# Patient Record
Sex: MALE | Race: WHITE | NOT HISPANIC OR LATINO | Employment: OTHER | URBAN - NONMETROPOLITAN AREA
[De-identification: names, ages, dates, MRNs, and addresses within clinical notes are randomized per-mention and may not be internally consistent; named-entity substitution may affect disease eponyms.]

---

## 2017-03-17 ENCOUNTER — OFFICE VISIT (OUTPATIENT)
Dept: UROLOGY | Facility: CLINIC | Age: 62
End: 2017-03-17

## 2017-03-17 VITALS
HEART RATE: 86 BPM | OXYGEN SATURATION: 99 % | TEMPERATURE: 98.8 F | DIASTOLIC BLOOD PRESSURE: 80 MMHG | SYSTOLIC BLOOD PRESSURE: 110 MMHG

## 2017-03-17 DIAGNOSIS — Z87.448 HISTORY OF HEMATURIA: Primary | ICD-10-CM

## 2017-03-17 DIAGNOSIS — E29.1 HYPOGONADISM IN MALE: ICD-10-CM

## 2017-03-17 LAB
BILIRUB BLD-MCNC: NEGATIVE MG/DL
CLARITY, POC: CLEAR
COLOR UR: YELLOW
GLUCOSE UR STRIP-MCNC: NEGATIVE MG/DL
KETONES UR QL: NEGATIVE
LEUKOCYTE EST, POC: NEGATIVE
NITRITE UR-MCNC: NEGATIVE MG/ML
PH UR: 6 [PH] (ref 5–8)
PROT UR STRIP-MCNC: NEGATIVE MG/DL
RBC # UR STRIP: ABNORMAL /UL
SP GR UR: 1.03 (ref 1–1.03)
UROBILINOGEN UR QL: NORMAL

## 2017-03-17 PROCEDURE — 99213 OFFICE O/P EST LOW 20 MIN: CPT | Performed by: UROLOGY

## 2017-03-17 PROCEDURE — 96372 THER/PROPH/DIAG INJ SC/IM: CPT | Performed by: UROLOGY

## 2017-03-17 PROCEDURE — 81003 URINALYSIS AUTO W/O SCOPE: CPT | Performed by: UROLOGY

## 2017-03-17 RX ORDER — TESTOSTERONE CYPIONATE 200 MG/ML
400 INJECTION, SOLUTION INTRAMUSCULAR ONCE
Status: COMPLETED | OUTPATIENT
Start: 2017-03-17 | End: 2017-03-17

## 2017-03-17 RX ADMIN — TESTOSTERONE CYPIONATE 400 MG: 200 INJECTION, SOLUTION INTRAMUSCULAR at 15:40

## 2017-03-17 NOTE — PROGRESS NOTES
Chief Complaint  Follow-up (3 MONTH FUP WITH LABS.)        ANTHONY Cee is a 61 y.o. male who returns today for follow-up of hypogonadism and low testosterone level.  He had a marginal improvement only and was dissatisfied with the topical testosterone cream.  Unfortunately his insurance deductibles are going to be too high on Aveed and Testopel.  Therefore he will begin the Depo-testosterone injections every 3 weeks in the office for trial.  He can return in 3 months with follow-up blood work to assure safety.  He has lot of heart problems in his cardiologist is anxious for him to take the testosterone.  He is currently taking aspirin and Plavix but his had no more gross hematuria from his enlarged prostate.  He CT scan last year revealed normal upper tracts except for some benign cysts    Vitals:    03/17/17 1109   BP: 110/80   Pulse: 86   Temp: 98.8 °F (37.1 °C)   SpO2: 99%       Past Medical History  Past Medical History   Diagnosis Date   • Allergic rhinitis    • Anxiety    • Back pain    • CAD (coronary artery disease)    • GERD (gastroesophageal reflux disease)    • Heart aneurysm    • Hyperlipidemia    • Hypertension    • Hypogonadism in male    • Kidney cysts    • Osteoarthritis    • Renal cyst    • Restless leg syndrome    • Sinus problem    • Stroke    • Vertigo    • Vision problem        Past Surgical History  Past Surgical History   Procedure Laterality Date   • Colonoscopy  2006   • Back surgery  09/03/2015       Medications  has a current medication list which includes the following prescription(s): acetaminophen, aspirin, aspirin, buspirone, carvedilol, cetirizine, clopidogrel, finasteride, fluvirin, isosorbide mononitrate, omeprazole, and rosuvastatin.      Allergies  No Known Allergies    Social History  Social History     Social History Narrative       Family History  He has no family history of bladder or kidney cancer  He has no family history of kidney stones      AUA Symptom Score:      Review  of Systems  Review of Systems   Constitutional: Negative.    Genitourinary: Negative.    All other systems reviewed and are negative.      Physical Exam  Physical Exam    Labs Recent and today in the office:  Results for orders placed or performed in visit on 03/17/17   POC Urinalysis Dipstick, Automated   Result Value Ref Range    Color Yellow Yellow, Straw, Dark Yellow, Reina    Clarity, UA Clear Clear    Glucose, UA Negative Negative, 1000 mg/dL (3+) mg/dL    Bilirubin Negative Negative    Ketones, UA Negative Negative    Specific Gravity  1.030 1.005 - 1.030    Blood, UA Trace (A) Negative    pH, Urine 6.0 5.0 - 8.0    Protein, POC Negative Negative mg/dL    Urobilinogen, UA Normal Normal    Leukocytes Negative Negative    Nitrite, UA Negative Negative         Assessment & Plan  He'll return in 3 months for reassessment

## 2017-04-07 ENCOUNTER — CLINICAL SUPPORT (OUTPATIENT)
Dept: UROLOGY | Facility: CLINIC | Age: 62
End: 2017-04-07

## 2017-04-07 DIAGNOSIS — E29.1 HYPOGONADISM IN MALE: ICD-10-CM

## 2017-04-07 DIAGNOSIS — R79.89 LOW TESTOSTERONE: Primary | ICD-10-CM

## 2017-04-07 PROCEDURE — 96372 THER/PROPH/DIAG INJ SC/IM: CPT | Performed by: UROLOGY

## 2017-04-07 RX ORDER — TESTOSTERONE CYPIONATE 200 MG/ML
400 INJECTION, SOLUTION INTRAMUSCULAR ONCE
Status: COMPLETED | OUTPATIENT
Start: 2017-04-07 | End: 2017-04-07

## 2017-04-07 RX ADMIN — TESTOSTERONE CYPIONATE 400 MG: 200 INJECTION, SOLUTION INTRAMUSCULAR at 13:39

## 2017-04-27 ENCOUNTER — CLINICAL SUPPORT (OUTPATIENT)
Dept: UROLOGY | Facility: CLINIC | Age: 62
End: 2017-04-27

## 2017-04-27 VITALS — HEART RATE: 82 BPM | SYSTOLIC BLOOD PRESSURE: 128 MMHG | DIASTOLIC BLOOD PRESSURE: 85 MMHG

## 2017-04-27 DIAGNOSIS — R79.89 LOW TESTOSTERONE: Primary | ICD-10-CM

## 2017-04-27 DIAGNOSIS — E29.1 HYPOGONADISM IN MALE: ICD-10-CM

## 2017-04-27 PROCEDURE — 96372 THER/PROPH/DIAG INJ SC/IM: CPT | Performed by: UROLOGY

## 2017-04-27 RX ORDER — TESTOSTERONE CYPIONATE 200 MG/ML
400 INJECTION, SOLUTION INTRAMUSCULAR ONCE
Status: COMPLETED | OUTPATIENT
Start: 2017-04-27 | End: 2017-04-27

## 2017-04-27 RX ADMIN — TESTOSTERONE CYPIONATE 400 MG: 200 INJECTION, SOLUTION INTRAMUSCULAR at 14:28

## 2017-05-05 ENCOUNTER — TRANSCRIBE ORDERS (OUTPATIENT)
Dept: ENDOCRINOLOGY | Facility: CLINIC | Age: 62
End: 2017-05-05

## 2017-05-05 DIAGNOSIS — R79.89 LOW TESTOSTERONE: Primary | ICD-10-CM

## 2017-05-26 ENCOUNTER — CLINICAL SUPPORT (OUTPATIENT)
Dept: UROLOGY | Facility: CLINIC | Age: 62
End: 2017-05-26

## 2017-05-26 VITALS — DIASTOLIC BLOOD PRESSURE: 72 MMHG | SYSTOLIC BLOOD PRESSURE: 120 MMHG

## 2017-05-26 DIAGNOSIS — R79.89 LOW TESTOSTERONE: Primary | ICD-10-CM

## 2017-05-26 DIAGNOSIS — E29.1 HYPOGONADISM IN MALE: ICD-10-CM

## 2017-05-26 PROCEDURE — 96372 THER/PROPH/DIAG INJ SC/IM: CPT | Performed by: UROLOGY

## 2017-05-26 RX ORDER — TESTOSTERONE CYPIONATE 200 MG/ML
400 INJECTION, SOLUTION INTRAMUSCULAR ONCE
Status: COMPLETED | OUTPATIENT
Start: 2017-05-26 | End: 2017-05-26

## 2017-05-26 RX ADMIN — TESTOSTERONE CYPIONATE 400 MG: 200 INJECTION, SOLUTION INTRAMUSCULAR at 11:00

## 2017-06-19 ENCOUNTER — LAB (OUTPATIENT)
Dept: UROLOGY | Facility: CLINIC | Age: 62
End: 2017-06-19

## 2017-06-19 DIAGNOSIS — E29.1 TESTICULAR HYPOGONADISM: ICD-10-CM

## 2017-06-19 DIAGNOSIS — N40.1 BPH (BENIGN PROSTATIC HYPERTROPHY) WITH URINARY RETENTION: Primary | ICD-10-CM

## 2017-06-19 DIAGNOSIS — R33.8 BPH (BENIGN PROSTATIC HYPERTROPHY) WITH URINARY RETENTION: Primary | ICD-10-CM

## 2017-06-20 LAB
BASOPHILS # BLD AUTO: 0.06 10*3/MM3 (ref 0–0.2)
BASOPHILS NFR BLD AUTO: 0.6 % (ref 0–2.5)
CONV COMMENT: ABNORMAL
EOSINOPHIL # BLD AUTO: 0.23 10*3/MM3 (ref 0–0.7)
EOSINOPHIL NFR BLD AUTO: 2.5 % (ref 0–7)
ERYTHROCYTE [DISTWIDTH] IN BLOOD BY AUTOMATED COUNT: 13 % (ref 11.5–14.5)
ESTRADIOL SERPL-MCNC: 10.4 PG/ML (ref 7.6–42.6)
HCT VFR BLD AUTO: 44.2 % (ref 42–52)
HGB BLD-MCNC: 14.7 G/DL (ref 14–18)
IMM GRANULOCYTES # BLD: 0.04 10*3/MM3 (ref 0–0.06)
IMM GRANULOCYTES NFR BLD: 0.4 % (ref 0–0.6)
LYMPHOCYTES # BLD AUTO: 3.02 10*3/MM3 (ref 0.6–3.4)
LYMPHOCYTES NFR BLD AUTO: 32.3 % (ref 10–50)
MCH RBC QN AUTO: 30.6 PG (ref 27–31)
MCHC RBC AUTO-ENTMCNC: 33.3 G/DL (ref 30–37)
MCV RBC AUTO: 92.1 FL (ref 80–94)
MONOCYTES # BLD AUTO: 0.89 10*3/MM3 (ref 0–0.9)
MONOCYTES NFR BLD AUTO: 9.5 % (ref 0–12)
NEUTROPHILS # BLD AUTO: 5.1 10*3/MM3 (ref 2–6.9)
NEUTROPHILS NFR BLD AUTO: 54.7 % (ref 37–80)
NRBC BLD AUTO-RTO: 0 /100 WBC (ref 0–0)
PLATELET # BLD AUTO: 286 10*3/MM3 (ref 130–400)
PSA SERPL-MCNC: 1.4 NG/ML (ref 0.06–4)
RBC # BLD AUTO: 4.8 10*6/MM3 (ref 4.7–6.1)
TESTOST SERPL-MCNC: 186 NG/DL (ref 348–1197)
WBC # BLD AUTO: 9.34 10*3/MM3 (ref 4.8–10.8)

## 2017-10-17 ENCOUNTER — TRANSCRIBE ORDERS (OUTPATIENT)
Dept: ADMINISTRATIVE | Facility: HOSPITAL | Age: 62
End: 2017-10-17

## 2017-10-17 DIAGNOSIS — R53.83 TIREDNESS: Primary | ICD-10-CM

## 2017-10-20 ENCOUNTER — APPOINTMENT (OUTPATIENT)
Dept: BONE DENSITY | Facility: HOSPITAL | Age: 62
End: 2017-10-20

## 2017-10-20 DIAGNOSIS — R53.83 TIREDNESS: ICD-10-CM

## 2017-10-20 PROCEDURE — 77080 DXA BONE DENSITY AXIAL: CPT

## 2017-11-07 ENCOUNTER — OFFICE VISIT (OUTPATIENT)
Dept: GASTROENTEROLOGY | Facility: CLINIC | Age: 62
End: 2017-11-07

## 2017-11-07 VITALS
TEMPERATURE: 97.8 F | DIASTOLIC BLOOD PRESSURE: 76 MMHG | BODY MASS INDEX: 29.26 KG/M2 | RESPIRATION RATE: 14 BRPM | HEIGHT: 71 IN | HEART RATE: 72 BPM | WEIGHT: 209 LBS | SYSTOLIC BLOOD PRESSURE: 129 MMHG

## 2017-11-07 DIAGNOSIS — K57.30 DIVERTICULOSIS OF COLON WITHOUT HEMORRHAGE: ICD-10-CM

## 2017-11-07 DIAGNOSIS — K63.9 LESION OF COLON: Primary | ICD-10-CM

## 2017-11-07 DIAGNOSIS — D12.2 ADENOMATOUS POLYP OF ASCENDING COLON: ICD-10-CM

## 2017-11-07 DIAGNOSIS — R12 HEARTBURN: ICD-10-CM

## 2017-11-07 PROCEDURE — 99214 OFFICE O/P EST MOD 30 MIN: CPT | Performed by: INTERNAL MEDICINE

## 2017-11-07 NOTE — PATIENT INSTRUCTIONS
1. Antireflux measures.  2. Low-fat diet.  3. Weight reduction.  4. Colonoscopy for reevaluation of the area resected in the ascending colon-3 cm flat lesion resected in October 2016: Description of the procedure, risks benefits alternatives and options including non-operative options were discussed with the patient in detail.  The patient understands and wishes to proceed.

## 2017-11-07 NOTE — PROGRESS NOTES
"Chief Complaint   Patient presents with   • Follow-up       History of Present Illness     The patient came back for follow visit today.  The patient feels better.  The patient denies recent change in bowel habits.  There is no diarrhea or constipation.  There is no history of abdominal pain.  There is no history of overt GI bleed (hematemesis melena or hematochezia).  The patient denies nausea or vomiting.  The patient has a history of reflux off and on for the last several years.  The reflux is moderately severe.  Symptoms are described as retrosternal burning sensation, and indigestion.  There is history of occasional regurgitative symptoms.  Frequency being several times per week.  The symptoms are worse at night.  The patient takes acid suppressive therapy with reasonable control of his symptoms.  The patient denies dysphagia or odynophagia.  There is no history of recent significant weight loss.  There is no history of liver or pancreatic disease.  The patient has history of retrosternal chest pains off and on almost on daily basis for the last several years.  The patient was last evaluated by cardiology service 2 months ago and was thought to be stable.  He had been treated with Ranexa in the past without significant improvement.  The patient has \"microvascular disease.    The patient has history of multiple colon polyps including large lesions.  One lesion in the ascending colon measuring 3 cm for which the patient had piecemeal undergone endoscopic mucosal resection in October 2016.  Biopsies revealed tubular adenoma.     Review of Systems   Constitutional: Negative for appetite change, chills, fatigue, fever and unexpected weight change.   HENT: Negative for mouth sores, nosebleeds and trouble swallowing.    Eyes: Negative for discharge and redness.   Respiratory: Negative for apnea, cough and shortness of breath.    Cardiovascular: Negative for chest pain, palpitations and leg swelling. "   Gastrointestinal: Negative for abdominal distention, abdominal pain, anal bleeding, blood in stool, constipation, diarrhea, nausea and vomiting.   Endocrine: Negative for cold intolerance, heat intolerance and polydipsia.   Genitourinary: Negative for dysuria, hematuria and urgency.   Musculoskeletal: Positive for arthralgias. Negative for joint swelling and myalgias.   Skin: Negative for rash.   Allergic/Immunologic: Negative for food allergies and immunocompromised state.   Neurological: Negative for dizziness, seizures, syncope and headaches.   Hematological: Negative for adenopathy. Bruises/bleeds easily.   Psychiatric/Behavioral: Negative for dysphoric mood. The patient is not nervous/anxious and is not hyperactive.      Patient Active Problem List   Diagnosis   • BPH (benign prostatic hyperplasia)   • Renal cyst     Past Medical History:   Diagnosis Date   • Allergic rhinitis    • Anxiety    • Back pain    • CAD (coronary artery disease)    • GERD (gastroesophageal reflux disease)    • Heart aneurysm    • Hyperlipidemia    • Hypertension    • Hypogonadism in male    • Kidney cysts    • Osteoarthritis    • Renal cyst    • Restless leg syndrome    • Sinus problem    • Stroke    • Vertigo    • Vision problem      Past Surgical History:   Procedure Laterality Date   • BACK SURGERY  09/03/2015   • COLONOSCOPY  2006     Family History   Problem Relation Age of Onset   • Colon cancer Father      Social History   Substance Use Topics   • Smoking status: Former Smoker   • Smokeless tobacco: Never Used      Comment: quit 2014   • Alcohol use No       Current Outpatient Prescriptions:   •  acetaminophen (TYLENOL) 500 MG tablet, Take 500 mg by mouth every 6 (six) hours as needed for mild pain (1-3)., Disp: , Rfl:   •  aspirin 81 MG EC tablet, Take 81 mg by mouth daily., Disp: , Rfl:   •  busPIRone (BUSPAR) 10 MG tablet, Take 10 mg by mouth 3 (three) times a day., Disp: , Rfl:   •  cetirizine (ZyrTEC) 10 MG tablet, Take  "10 mg by mouth daily., Disp: , Rfl:   •  clopidogrel (PLAVIX) 75 MG tablet, Take 75 mg by mouth daily., Disp: , Rfl:   •  finasteride (PROSCAR) 5 MG tablet, Take 1 tablet by mouth Daily., Disp: 90 tablet, Rfl: 3  •  FLUVIRIN suspension injection, ADM 0.5ML IM UTD, Disp: , Rfl: 0  •  isosorbide mononitrate (IMDUR) 30 MG 24 hr tablet, Take 30 mg by mouth daily., Disp: , Rfl:   •  METOPROLOL TARTRATE PO, Take  by mouth., Disp: , Rfl:   •  omeprazole (PriLOSEC) 20 MG capsule, Take 20 mg by mouth daily., Disp: , Rfl:   •  rosuvastatin (CRESTOR) 10 MG tablet, Take 10 mg by mouth daily., Disp: , Rfl:     No Known Allergies    Blood pressure 129/76, pulse 72, temperature 97.8 °F (36.6 °C), resp. rate 14, height 71\" (180.3 cm), weight 209 lb (94.8 kg).    Physical Exam   Constitutional: He is oriented to person, place, and time. He appears well-developed and well-nourished. No distress.   HENT:   Head: Normocephalic and atraumatic.   Right Ear: Hearing and external ear normal.   Left Ear: Hearing and external ear normal.   Nose: Nose normal.   Mouth/Throat: Oropharynx is clear and moist and mucous membranes are normal. Mucous membranes are not pale, not dry and not cyanotic. No oral lesions. No oropharyngeal exudate.   Eyes: Conjunctivae and EOM are normal. Right eye exhibits no discharge. Left eye exhibits no discharge. No scleral icterus.   Neck: Trachea normal. Neck supple. No JVD present. No edema present. No thyroid mass and no thyromegaly present.   Cardiovascular: Normal rate, regular rhythm, S2 normal and normal heart sounds.  Exam reveals no gallop, no S3 and no friction rub.    No murmur heard.  Pulmonary/Chest: Effort normal and breath sounds normal. No respiratory distress. He has no wheezes. He has no rales. He exhibits no tenderness.   Abdominal: Soft. Normal appearance and bowel sounds are normal. He exhibits no distension, no ascites and no mass. There is no splenomegaly or hepatomegaly. There is no " tenderness. There is no rigidity, no rebound and no guarding. No hernia.   Musculoskeletal: He exhibits no tenderness or deformity.       Vascular Status -  His exam exhibits no right foot edema. His exam exhibits no left foot edema.  Lymphadenopathy:     He has no cervical adenopathy.        Left: No supraclavicular adenopathy present.   Neurological: He is alert and oriented to person, place, and time. He has normal strength. No cranial nerve deficit or sensory deficit. He exhibits normal muscle tone. Coordination normal.   Skin: No rash noted. He is not diaphoretic. No cyanosis. No pallor. Nails show no clubbing.   Psychiatric: He has a normal mood and affect. His behavior is normal. Judgment and thought content normal.   Nursing note and vitals reviewed.    Stigmata of chronic liver disease:  None.  Asterixis:  None.      Laboratory Testing:  Upon review of medical records:    Dated September 22, 2015 white count 9.2 hemoglobin 14.4 hematocrit 41.7 and platelets 312  Dated September 1, 2015 sodium 140 potassium 3.8 chloride 108 CO2 21 BUN 8 creatinine 0.7 and glucose 83 calcium 9.0.  Dated September 1, 2015 PT 19.6 INR 0.92.    Dated June 20, 2017 WBC 9.34 hemoglobin 14.7 hematocrit 44.2 MCV 92.1 and platelet count 286.     Procedures:    Upon review of medical records:     Dated October 5, 2016 the patient underwent a colonoscopy to the terminal ileum which revealed:  Large flat lesion within the ascending colon, flat lesions within the cecum status post mucosal resections and thermal ablation therapy.  Left-sided diverticulosis.  Colon polyps.  Internal hemorrhoids.  Transverse colon polyp, biopsy revealed tubular adenoma.  Ascending colon, flat lesion, biopsy revealed tubular adenoma.  Colon polyp, appendiceal orifice, biopsy revealed tubular adenoma.  Colon, Cecum, lesion Biopsy revealed tubular adenoma.       Assessment and Plan:      Deny was seen today for follow-up.    Diagnoses and all orders for  this visit:    Lesion of colon  Comments:  3 cm flat lesion in the ascending colon.  Status post piecemeal endoscopic mucosal resection in October 2016.    Adenomatous polyp of ascending colon  Comments:  Multiple large colonic tubular adenomata.    Diverticulosis of colon without hemorrhage  Comments:  Uncomplicated.    Heartburn  Comments:  Stable.              Plan  and Patient Instructions:    Patient Instructions   1. Antireflux measures.  2. Low-fat diet.  3. Weight reduction.  4. Colonoscopy for reevaluation of the area resected in the ascending colon-3 cm flat lesion resected in October 2016: Description of the procedure, risks benefits alternatives and options including non-operative options were discussed with the patient in detail.  The patient understands and wishes to proceed.          Juliano Manriquez MD

## 2017-11-08 ENCOUNTER — PREP FOR SURGERY (OUTPATIENT)
Dept: OTHER | Facility: HOSPITAL | Age: 62
End: 2017-11-08

## 2017-11-08 DIAGNOSIS — Z80.0 FAMILY HISTORY OF COLON CANCER: ICD-10-CM

## 2017-11-08 DIAGNOSIS — Z12.11 COLON CANCER SCREENING: Primary | ICD-10-CM

## 2017-11-08 DIAGNOSIS — K63.9 LESION OF COLON: ICD-10-CM

## 2017-11-08 DIAGNOSIS — Z86.010 HISTORY OF COLON POLYPS: ICD-10-CM

## 2017-11-15 RX ORDER — SODIUM CHLORIDE 9 MG/ML
70 INJECTION, SOLUTION INTRAVENOUS CONTINUOUS PRN
Status: CANCELLED | OUTPATIENT
Start: 2017-11-15

## 2017-11-16 PROBLEM — K63.9 LESION OF COLON: Status: ACTIVE | Noted: 2017-11-16

## 2017-11-16 PROBLEM — Z86.0100 HISTORY OF COLON POLYPS: Status: ACTIVE | Noted: 2017-11-16

## 2017-11-16 PROBLEM — Z86.010 HISTORY OF COLON POLYPS: Status: ACTIVE | Noted: 2017-11-16

## 2017-11-16 PROBLEM — Z12.11 COLON CANCER SCREENING: Status: ACTIVE | Noted: 2017-11-16

## 2017-11-16 PROBLEM — Z80.0 FAMILY HISTORY OF COLON CANCER: Status: ACTIVE | Noted: 2017-11-16

## 2017-11-28 ENCOUNTER — ANESTHESIA (OUTPATIENT)
Dept: GASTROENTEROLOGY | Facility: HOSPITAL | Age: 62
End: 2017-11-28

## 2017-11-28 ENCOUNTER — ANESTHESIA EVENT (OUTPATIENT)
Dept: GASTROENTEROLOGY | Facility: HOSPITAL | Age: 62
End: 2017-11-28

## 2017-11-28 ENCOUNTER — HOSPITAL ENCOUNTER (OUTPATIENT)
Facility: HOSPITAL | Age: 62
Setting detail: HOSPITAL OUTPATIENT SURGERY
Discharge: HOME | End: 2017-11-28
Attending: INTERNAL MEDICINE | Admitting: INTERNAL MEDICINE

## 2017-11-28 VITALS
TEMPERATURE: 97.3 F | HEIGHT: 71 IN | SYSTOLIC BLOOD PRESSURE: 123 MMHG | OXYGEN SATURATION: 98 % | WEIGHT: 203 LBS | RESPIRATION RATE: 18 BRPM | HEART RATE: 61 BPM | DIASTOLIC BLOOD PRESSURE: 70 MMHG | BODY MASS INDEX: 28.42 KG/M2

## 2017-11-28 DIAGNOSIS — Z80.0 FAMILY HISTORY OF COLON CANCER: ICD-10-CM

## 2017-11-28 DIAGNOSIS — Z86.010 HISTORY OF COLON POLYPS: ICD-10-CM

## 2017-11-28 DIAGNOSIS — K63.9 LESION OF COLON: ICD-10-CM

## 2017-11-28 DIAGNOSIS — Z12.11 COLON CANCER SCREENING: ICD-10-CM

## 2017-11-28 PROCEDURE — 25010000002 PROPOFOL 200 MG/20ML EMULSION: Performed by: NURSE ANESTHETIST, CERTIFIED REGISTERED

## 2017-11-28 PROCEDURE — 45381 COLONOSCOPY SUBMUCOUS NJX: CPT | Performed by: INTERNAL MEDICINE

## 2017-11-28 PROCEDURE — 45382 COLONOSCOPY W/CONTROL BLEED: CPT | Performed by: INTERNAL MEDICINE

## 2017-11-28 PROCEDURE — 45380 COLONOSCOPY AND BIOPSY: CPT | Performed by: INTERNAL MEDICINE

## 2017-11-28 PROCEDURE — 88305 TISSUE EXAM BY PATHOLOGIST: CPT | Performed by: INTERNAL MEDICINE

## 2017-11-28 PROCEDURE — 45388 COLONOSCOPY W/ABLATION: CPT | Performed by: INTERNAL MEDICINE

## 2017-11-28 PROCEDURE — 45385 COLONOSCOPY W/LESION REMOVAL: CPT | Performed by: INTERNAL MEDICINE

## 2017-11-28 PROCEDURE — 25010000002 MIDAZOLAM PER 1 MG: Performed by: NURSE ANESTHETIST, CERTIFIED REGISTERED

## 2017-11-28 RX ORDER — PROPOFOL 10 MG/ML
INJECTION, EMULSION INTRAVENOUS AS NEEDED
Status: DISCONTINUED | OUTPATIENT
Start: 2017-11-28 | End: 2017-11-28 | Stop reason: SURG

## 2017-11-28 RX ORDER — MEPERIDINE HYDROCHLORIDE 50 MG/ML
INJECTION INTRAMUSCULAR; INTRAVENOUS; SUBCUTANEOUS AS NEEDED
Status: DISCONTINUED | OUTPATIENT
Start: 2017-11-28 | End: 2017-11-28 | Stop reason: SURG

## 2017-11-28 RX ORDER — MIDAZOLAM HYDROCHLORIDE 1 MG/ML
INJECTION INTRAMUSCULAR; INTRAVENOUS AS NEEDED
Status: DISCONTINUED | OUTPATIENT
Start: 2017-11-28 | End: 2017-11-28 | Stop reason: SURG

## 2017-11-28 RX ORDER — SODIUM CHLORIDE 9 MG/ML
70 INJECTION, SOLUTION INTRAVENOUS CONTINUOUS PRN
Status: DISCONTINUED | OUTPATIENT
Start: 2017-11-28 | End: 2017-11-28 | Stop reason: HOSPADM

## 2017-11-28 RX ADMIN — MEPERIDINE HYDROCHLORIDE 50 MG: 50 INJECTION INTRAMUSCULAR; INTRAVENOUS; SUBCUTANEOUS at 10:42

## 2017-11-28 RX ADMIN — PROPOFOL 30 MG: 10 INJECTION, EMULSION INTRAVENOUS at 11:17

## 2017-11-28 RX ADMIN — PROPOFOL 40 MG: 10 INJECTION, EMULSION INTRAVENOUS at 11:25

## 2017-11-28 RX ADMIN — PROPOFOL 50 MG: 10 INJECTION, EMULSION INTRAVENOUS at 11:21

## 2017-11-28 RX ADMIN — PROPOFOL 50 MG: 10 INJECTION, EMULSION INTRAVENOUS at 11:13

## 2017-11-28 RX ADMIN — PROPOFOL 30 MG: 10 INJECTION, EMULSION INTRAVENOUS at 11:32

## 2017-11-28 RX ADMIN — LIDOCAINE HYDROCHLORIDE 30 MG: 20 INJECTION, SOLUTION INTRAVENOUS at 10:52

## 2017-11-28 RX ADMIN — MIDAZOLAM HYDROCHLORIDE 2 MG: 1 INJECTION, SOLUTION INTRAMUSCULAR; INTRAVENOUS at 10:41

## 2017-11-28 RX ADMIN — PROPOFOL 50 MG: 10 INJECTION, EMULSION INTRAVENOUS at 10:58

## 2017-11-28 RX ADMIN — PROPOFOL 60 MG: 10 INJECTION, EMULSION INTRAVENOUS at 11:05

## 2017-11-28 RX ADMIN — PROPOFOL 40 MG: 10 INJECTION, EMULSION INTRAVENOUS at 11:42

## 2017-11-28 RX ADMIN — SODIUM CHLORIDE 70 ML/HR: 9 INJECTION, SOLUTION INTRAVENOUS at 09:09

## 2017-11-28 RX ADMIN — PROPOFOL 50 MG: 10 INJECTION, EMULSION INTRAVENOUS at 10:52

## 2017-11-28 NOTE — ANESTHESIA POSTPROCEDURE EVALUATION
Patient: Deny Cee    Procedure Summary     Date Anesthesia Start Anesthesia Stop Room / Location    11/28/17 1040 1152 [facility] ENDOSCOPY 2 / [facility] ENDOSCOPY       Procedure Diagnosis Surgeon Provider    COLONOSCOPY with cold snare polypectomy, cold biopsy polypectomies,  argon thermal ablation, submucosal injection of normal saline,  and biopsies (N/A Anus) Angiodysplasia of colon with hemorrhage; Colon polyps; Internal hemorrhoid; History of colon polyps  (Family history of colon cancer [Z80.0]; Colon cancer screening [Z12.11]; Lesion of colon [K63.9]; History of colon polyps [Z86.010]) MD Julio Delarosa CRNA          Anesthesia Type: MAC  Last vitals  BP   115/55 (11/28/17 0903)   Temp   97.3 °F (36.3 °C) (11/28/17 0903)   Pulse   56 (11/28/17 0903)   Resp   18 (11/28/17 0903)     SpO2   98 % (11/28/17 0903)     Post Anesthesia Care and Evaluation    Patient location during evaluation: bedside  Patient participation: complete - patient participated  Level of consciousness: awake and alert  Pain management: satisfactory to patient  Airway patency: patent  Anesthetic complications: No anesthetic complications  PONV Status: controlled  Cardiovascular status: acceptable and stable  Respiratory status: acceptable  Hydration status: acceptable

## 2017-11-28 NOTE — ANESTHESIA PREPROCEDURE EVALUATION
Anesthesia Evaluation     Patient summary reviewed and Nursing notes reviewed   NPO Solid Status: > 8 hours  NPO Liquid Status: > 8 hours     Airway   Mallampati: II  TM distance: <3 FB  no difficulty expected  Dental      Pulmonary    Cardiovascular   Exercise tolerance: good (4-7 METS)    Rhythm: regular  Rate: normal    (+) hypertension, CAD, CHF, hyperlipidemia    ROS comment: Last used ntg two weeks ago    Neuro/Psych  (+) CVA, dizziness/light headedness,    GI/Hepatic/Renal/Endo    (+)  GERD,     Musculoskeletal     (+) back pain,   Abdominal    Substance History      OB/GYN          Other   (+) arthritis   history of cancer                                  Anesthesia Plan    ASA 3     MAC     intravenous induction   Anesthetic plan and risks discussed with patient.    Plan discussed with CRNA.

## 2017-12-05 LAB
LAB AP CASE REPORT: NORMAL
Lab: NORMAL
PATH REPORT.FINAL DX SPEC: NORMAL

## 2018-01-03 ENCOUNTER — OFFICE VISIT (OUTPATIENT)
Dept: GASTROENTEROLOGY | Facility: CLINIC | Age: 63
End: 2018-01-03

## 2018-01-03 VITALS
HEART RATE: 80 BPM | BODY MASS INDEX: 29.12 KG/M2 | SYSTOLIC BLOOD PRESSURE: 122 MMHG | DIASTOLIC BLOOD PRESSURE: 73 MMHG | HEIGHT: 71 IN | TEMPERATURE: 97.9 F | WEIGHT: 208 LBS | RESPIRATION RATE: 18 BRPM

## 2018-01-03 DIAGNOSIS — K55.20 ANGIODYSPLASIA OF COLON: ICD-10-CM

## 2018-01-03 DIAGNOSIS — R12 HEARTBURN: ICD-10-CM

## 2018-01-03 DIAGNOSIS — K57.30 DIVERTICULOSIS OF COLON WITHOUT HEMORRHAGE: ICD-10-CM

## 2018-01-03 DIAGNOSIS — D12.2 ADENOMATOUS POLYP OF ASCENDING COLON: Primary | ICD-10-CM

## 2018-01-03 PROCEDURE — 99214 OFFICE O/P EST MOD 30 MIN: CPT | Performed by: INTERNAL MEDICINE

## 2018-01-03 RX ORDER — FAMOTIDINE 20 MG/1
20 TABLET, FILM COATED ORAL 2 TIMES DAILY
Qty: 60 TABLET | Refills: 3 | Status: SHIPPED | OUTPATIENT
Start: 2018-01-03 | End: 2018-07-03 | Stop reason: SDUPTHER

## 2018-01-03 NOTE — PROGRESS NOTES
Chief Complaint   Patient presents with   • Follow-up       History of Present Illness     The patient came back for follow visit today.  The patient feels better.  The patient denies recent change in bowel habits.  There is no diarrhea or constipation.  There is no history of abdominal pain.  The patient has history of bright red blood per rectum off and on for the last several months.  This is described as mild.  Quantity being a couple of drops at a time.  Frequency being 1-2 times a month.  There is no history of associated dizziness.  The patient denies anorectal pain.      The patient denies nausea or vomiting.  The patient has a history of reflux off and on for the last .  The reflux is mildly severe.  Symptoms are described as retrosternal burning sensation, and indigestion.  There is no history of occasional regurgitative symptoms.  Frequency being 2-3 per week.  The symptoms are worse at night.  The patient takes acid suppressive therapy As needed basis perhaps 2-3 times a week with reasonable control of his symptoms.  The patient denies dysphagia or odynophagia.  There is no history of recent significant weight loss.  There is no history of liver or pancreatic disease.      Review of Systems   Constitutional: Negative for appetite change, chills, fatigue, fever and unexpected weight change.   HENT: Negative for mouth sores, nosebleeds and trouble swallowing.    Eyes: Negative for discharge and redness.   Respiratory: Negative for apnea, cough and shortness of breath.    Cardiovascular: Negative for chest pain, palpitations and leg swelling.   Gastrointestinal: Negative for abdominal distention, abdominal pain, anal bleeding, blood in stool, constipation, diarrhea, nausea and vomiting.   Endocrine: Negative for cold intolerance, heat intolerance and polydipsia.   Genitourinary: Negative for dysuria, hematuria and urgency.   Musculoskeletal: Positive for arthralgias. Negative for joint swelling and  myalgias.   Skin: Negative for rash.   Allergic/Immunologic: Negative for food allergies and immunocompromised state.   Neurological: Negative for dizziness, seizures, syncope and headaches.   Hematological: Negative for adenopathy. Bruises/bleeds easily.   Psychiatric/Behavioral: Negative for dysphoric mood. The patient is not nervous/anxious and is not hyperactive.      Patient Active Problem List   Diagnosis   • BPH (benign prostatic hyperplasia)   • Renal cyst   • Family history of colon cancer   • Colon cancer screening   • Lesion of colon   • History of colon polyps     Past Medical History:   Diagnosis Date   • Allergic rhinitis    • Anxiety    • Back pain    • CAD (coronary artery disease)    • Cancer     SKIN CANCER ON NOSE , PRECANCER WITH LAST COLONOSCOPY   • CHF (congestive heart failure)     [facility] [place]. WILL SEE IN JAN. 2018   • Enlarged prostate    • GERD (gastroesophageal reflux disease)    • Heart aneurysm    • Hyperlipidemia    • Hypertension    • Hypogonadism in male    • Kidney cysts    • Kidney stones     PASSED    • Osteoarthritis    • Renal cyst    • Restless leg syndrome    • Sinus problem    • Stroke    • Vertigo    • Vision problem      Past Surgical History:   Procedure Laterality Date   • BACK SURGERY  09/03/2015   • COLONOSCOPY  2006   • COLONOSCOPY N/A 11/28/2017    Procedure: COLONOSCOPY with cold snare polypectomy, cold biopsy polypectomies,  argon thermal ablation, submucosal injection of normal saline,  and biopsies;  Surgeon: Juilano Manriquez MD;  Location: [facility] ENDOSCOPY;  Service:      Family History   Problem Relation Age of Onset   • Colon cancer Father      Social History   Substance Use Topics   • Smoking status: Former Smoker   • Smokeless tobacco: Never Used      Comment: quit 2014   • Alcohol use No       Current Outpatient Prescriptions:   •  acetaminophen (TYLENOL) 500 MG tablet, Take 500 mg by mouth every 6 (six) hours as needed for  "mild pain (1-3)., Disp: , Rfl:   •  aspirin 81 MG EC tablet, Take 81 mg by mouth daily., Disp: , Rfl:   •  busPIRone (BUSPAR) 10 MG tablet, Take 10 mg by mouth 3 (three) times a day., Disp: , Rfl:   •  cetirizine (ZyrTEC) 10 MG tablet, Take 10 mg by mouth daily., Disp: , Rfl:   •  clopidogrel (PLAVIX) 75 MG tablet, Take 75 mg by mouth daily., Disp: , Rfl:   •  finasteride (PROSCAR) 5 MG tablet, Take 1 tablet by mouth Daily., Disp: 90 tablet, Rfl: 3  •  FLUVIRIN suspension injection, ADM 0.5ML IM UTD, Disp: , Rfl: 0  •  isosorbide mononitrate (IMDUR) 30 MG 24 hr tablet, Take 30 mg by mouth daily., Disp: , Rfl:   •  METOPROLOL TARTRATE PO, Take 25 mg by mouth Daily., Disp: , Rfl:   •  omeprazole (PriLOSEC) 20 MG capsule, Take 20 mg by mouth Daily As Needed., Disp: , Rfl:   •  rosuvastatin (CRESTOR) 10 MG tablet, Take 10 mg by mouth daily., Disp: , Rfl:   •  famotidine (PEPCID) 20 MG tablet, Take 1 tablet by mouth 2 (Two) Times a Day., Disp: 60 tablet, Rfl: 3    No Known Allergies    Blood pressure 122/73, pulse 80, temperature 97.9 °F (36.6 °C), resp. rate 18, height 180.3 cm (70.98\"), weight 94.3 kg (208 lb).    Physical Exam   Constitutional: He is oriented to person, place, and time. He appears well-developed and well-nourished. No distress.   HENT:   Head: Normocephalic and atraumatic.   Right Ear: Hearing and external ear normal.   Left Ear: Hearing and external ear normal.   Nose: Nose normal.   Mouth/Throat: Oropharynx is clear and moist and mucous membranes are normal. Mucous membranes are not pale, not dry and not cyanotic. No oral lesions. No oropharyngeal exudate.   Eyes: Conjunctivae and EOM are normal. Right eye exhibits no discharge. Left eye exhibits no discharge. No scleral icterus.   Neck: Trachea normal. Neck supple. No JVD present. No edema present. No thyroid mass and no thyromegaly present.   Cardiovascular: Normal rate, regular rhythm, S2 normal and normal heart sounds.  Exam reveals no gallop, " no S3 and no friction rub.    No murmur heard.  Pulmonary/Chest: Effort normal and breath sounds normal. No respiratory distress. He has no wheezes. He has no rales. He exhibits no tenderness.   Abdominal: Soft. Normal appearance and bowel sounds are normal. He exhibits no distension, no ascites and no mass. There is no splenomegaly or hepatomegaly. There is no tenderness. There is no rigidity, no rebound and no guarding. No hernia.   Musculoskeletal: He exhibits no tenderness or deformity.       Vascular Status -  His exam exhibits no right foot edema. His exam exhibits no left foot edema.  Lymphadenopathy:     He has no cervical adenopathy.        Left: No supraclavicular adenopathy present.   Neurological: He is alert and oriented to person, place, and time. He has normal strength. No cranial nerve deficit or sensory deficit. He exhibits normal muscle tone. Coordination normal.   Skin: No rash noted. He is not diaphoretic. No cyanosis. No pallor. Nails show no clubbing.   Psychiatric: He has a normal mood and affect. His behavior is normal. Judgment and thought content normal.   Nursing note and vitals reviewed.    Stigmata of chronic liver disease:  None.  Asterixis:  None.      Laboratory Testing:  Upon review of medical records:    Dated September 1, 2015 white count 9.2 hemoglobin 14.4 hematocrit 41.7 and platelets 312.  PT 19.6 INR 0.92.  Dated September 1, 2015 sodium 140 potassium 3.8 chloride 108 CO2 21 BUN 8 creatinine 0.7 and glucose 83 calcium 9.0.       Dated June 20, 2017 WBC 9.34 hemoglobin 14.7 hematocrit 44.2 MCV 92.1 and platelet count 286.     Procedures:    Upon review of medical records:     Dated October 5, 2016 the patient underwent a colonoscopy to the terminal ileum which revealed:  Large flat lesion within the ascending colon, flat lesions within the cecum status post mucosal resections and thermal ablation therapy.  Left-sided diverticulosis.  Colon polyps.  Internal hemorrhoids.   Transverse colon polyp, biopsy revealed tubular adenoma.  Ascending colon, flat lesion, biopsy revealed tubular adenoma.  Colon polyp, appendiceal orifice, biopsy revealed tubular adenoma.  Colon, Cecum, lesion Biopsy revealed tubular adenoma.    Dated November 28, 2017 the patient underwent a colonoscopy to the terminal ileum which revealed: Angiodysplasia of the colon with a tendency to ooze blood.  Status post thermal ablation therapy.  Scant diverticular change in the left colon.  Colon polyps.  Somewhat polypoid area behind ileocecal valve.  Biopsied and treated with thermal ablation therapy.  Previously resected site in the ascending colon was noted to be stable.  Ascending colon, from previous biopsy site, biopsies revealed separate fragments of tubular adenoma without high-grade dysplasia.  Fragments of benign colonic mucosa with no specific pathologic diagnosis.  Colon, polypoid area behind ileocecal valve, biopsy revealed fragments of tubular adenoma.  No evidence of high-grade dysplasia or malignancy.  Sigmoid colon polyp, biopsy revealed hyperplastic polyp.  No evidence of dysplasia or malignancy.  Rectal polyps, biopsy revealed hyperplastic polyp.  No evidence of dysplasia or malignancy.     Assessment and Plan:      Deny was seen today for follow-up.    Diagnoses and all orders for this visit:    Adenomatous polyp of ascending colon    Angiodysplasia of colon    Diverticulosis of colon without hemorrhage    Heartburn  -     famotidine (PEPCID) 20 MG tablet; Take 1 tablet by mouth 2 (Two) Times a Day.          Plan  and Patient Instructions:    Patient Instructions   1. Low-fat high-fiber diet.  2. Antireflux measures.  3. Pepcid 20 mg one by mouth twice a day .  4. The patient may call back in one week regarding progress.  Should his symptoms persist on Pepcid the patient may take lansoprazole 15 mg or Nexium 20 mg one in the morning half an hour before breakfast.  Omeprazole is not a good choice to  be taken on as needed basis.  5. Hemorrhoidal care.  6. Follow-up colonoscopy in 1 to 2 years.        Juliano Manriquez MD

## 2018-01-03 NOTE — PATIENT INSTRUCTIONS
1. Low-fat high-fiber diet.  2. Antireflux measures.  3. Pepcid 20 mg one by mouth twice a day .  4. The patient may call back in one week regarding progress.  Should his symptoms persist on Pepcid the patient may take lansoprazole 15 mg or Nexium 20 mg one in the morning half an hour before breakfast.  Omeprazole is not a good choice to be taken on as needed basis.  5. Hemorrhoidal care.  6. Follow-up colonoscopy in 1 to 2 years.

## 2018-07-03 DIAGNOSIS — R12 HEARTBURN: ICD-10-CM

## 2018-07-03 RX ORDER — FAMOTIDINE 20 MG/1
20 TABLET, FILM COATED ORAL 2 TIMES DAILY
Qty: 60 TABLET | Refills: 3 | OUTPATIENT
Start: 2018-07-03

## 2018-07-03 NOTE — TELEPHONE ENCOUNTER
FAMOTIDINE 20 MG 1 TABLET BY MOUTH 2 TIMES A DAY #60 WITH 3 REFILLS CALLED INTO [facility] [place].

## 2018-08-22 ENCOUNTER — OFFICE VISIT (OUTPATIENT)
Dept: ORTHOPEDIC SURGERY | Facility: CLINIC | Age: 63
End: 2018-08-22

## 2018-08-22 VITALS — HEIGHT: 70 IN | WEIGHT: 210.3 LBS | RESPIRATION RATE: 18 BRPM | BODY MASS INDEX: 30.11 KG/M2

## 2018-08-22 DIAGNOSIS — M25.562 ARTHRALGIA OF LEFT KNEE: Primary | ICD-10-CM

## 2018-08-22 DIAGNOSIS — M25.562 LEFT KNEE PAIN, UNSPECIFIED CHRONICITY: Primary | ICD-10-CM

## 2018-08-22 DIAGNOSIS — S83.207A POSITIVE MCMURRAY SIGN (MENISCUS TEAR) OF LEFT KNEE, INITIAL ENCOUNTER: ICD-10-CM

## 2018-08-22 DIAGNOSIS — M23.92 KNEE LOCKING, LEFT: ICD-10-CM

## 2018-08-22 PROCEDURE — 20610 DRAIN/INJ JOINT/BURSA W/O US: CPT | Performed by: PHYSICIAN ASSISTANT

## 2018-08-22 PROCEDURE — 99203 OFFICE O/P NEW LOW 30 MIN: CPT | Performed by: PHYSICIAN ASSISTANT

## 2018-08-22 RX ORDER — CETIRIZINE HYDROCHLORIDE 10 MG/1
10 TABLET ORAL DAILY
COMMUNITY
End: 2018-11-09

## 2018-08-22 RX ORDER — BACLOFEN 500 UG/ML
INJECTION, SOLUTION INTRATHECAL ONCE
COMMUNITY
End: 2019-07-07 | Stop reason: HOSPADM

## 2018-08-22 RX ORDER — METHYLPREDNISOLONE ACETATE 40 MG/ML
40 INJECTION, SUSPENSION INTRA-ARTICULAR; INTRALESIONAL; INTRAMUSCULAR; SOFT TISSUE
Status: COMPLETED | OUTPATIENT
Start: 2018-08-22 | End: 2018-08-22

## 2018-08-22 RX ORDER — LIDOCAINE HYDROCHLORIDE 10 MG/ML
2 INJECTION, SOLUTION INFILTRATION; PERINEURAL
Status: COMPLETED | OUTPATIENT
Start: 2018-08-22 | End: 2018-08-22

## 2018-08-22 RX ORDER — AMLODIPINE BESYLATE AND BENAZEPRIL HYDROCHLORIDE 10; 20 MG/1; MG/1
1 CAPSULE ORAL DAILY
COMMUNITY
End: 2019-07-07 | Stop reason: HOSPADM

## 2018-08-22 RX ADMIN — METHYLPREDNISOLONE ACETATE 40 MG: 40 INJECTION, SUSPENSION INTRA-ARTICULAR; INTRALESIONAL; INTRAMUSCULAR; SOFT TISSUE at 13:45

## 2018-08-22 RX ADMIN — LIDOCAINE HYDROCHLORIDE 2 ML: 10 INJECTION, SOLUTION INFILTRATION; PERINEURAL at 13:45

## 2018-08-22 NOTE — PROGRESS NOTES
"Subjective   Patient ID: Deny Cee is a 62 y.o. right hand dominant male  Pain of the Left Knee         History of Present Illness  Patient presents as a new patient with complaints of left knee pain that began 2 days prior.  He is unaware of any injury or trauma.  He states he did wash and clean his camper which required him to repetitively step up and down on a stepladder the night before his pain began he had no pain while doing the activity.  He also reports leading up to his discomfort he had worked around in his garden which required him to bend and squat repetitively.  He noticed he had some swelling and intense pain with a loud pop to the left knee  Patient denies left hip or left ankle pain.  He did try Tylenol and over-the-counter knee brace with no improvement.  He denies knee instability.  He reports that if he bends his knee the pain worsens and he has to \"re-position the knee\"           Past Medical History:   Diagnosis Date   • Allergic rhinitis    • Anxiety    • Back pain    • CAD (coronary artery disease)    • Cancer (CMS/HCC)     SKIN CANCER ON NOSE , PRECANCER WITH LAST COLONOSCOPY   • CHF (congestive heart failure) (CMS/HCC)     [facility] IN [place]. WILL SEE IN JAN. 2018   • Enlarged prostate    • GERD (gastroesophageal reflux disease)    • Heart aneurysm    • Hyperlipidemia    • Hypertension    • Hypogonadism in male    • Kidney cysts    • Kidney stones     PASSED    • Osteoarthritis    • Renal cyst    • Restless leg syndrome    • Sinus problem    • Stroke (CMS/HCC)    • Vertigo    • Vision problem         Past Surgical History:   Procedure Laterality Date   • BACK SURGERY  09/03/2015   • COLONOSCOPY  2006   • COLONOSCOPY N/A 11/28/2017    Procedure: COLONOSCOPY with cold snare polypectomy, cold biopsy polypectomies,  argon thermal ablation, submucosal injection of normal saline,  and biopsies;  Surgeon: Juliano Manriquez MD;  Location: [facility] ENDOSCOPY;  " Service:        Family History   Problem Relation Age of Onset   • Colon cancer Father        Social History     Social History   • Marital status:      Spouse name: N/A   • Number of children: N/A   • Years of education: N/A     Occupational History   • Not on file.     Social History Main Topics   • Smoking status: Former Smoker   • Smokeless tobacco: Never Used      Comment: quit 2014   • Alcohol use No   • Drug use: No   • Sexual activity: Defer     Other Topics Concern   • Not on file     Social History Narrative   • No narrative on file         Current Outpatient Prescriptions:   •  amLODIPine-benazepril (LOTREL) 10-20 MG per capsule, Take 1 capsule by mouth Daily., Disp: , Rfl:   •  apixaban (ELIQUIS) 5 MG tablet tablet, Take 5 mg by mouth 2 (Two) Times a Day., Disp: , Rfl:   •  baclofen (LIORESAL) 10 MG/20ML injection, by Intrathecal route 1 (One) Time., Disp: , Rfl:   •  busPIRone (BUSPAR) 10 MG tablet, Take 10 mg by mouth 3 (three) times a day., Disp: , Rfl:   •  cetirizine (zyrTEC) 10 MG tablet, Take 10 mg by mouth Daily., Disp: , Rfl:   •  clopidogrel (PLAVIX) 75 MG tablet, Take 75 mg by mouth daily., Disp: , Rfl:   •  famotidine (PEPCID) 20 MG tablet, Take 1 tablet by mouth 2 (Two) Times a Day., Disp: 60 tablet, Rfl: 3  •  finasteride (PROSCAR) 5 MG tablet, Take 1 tablet by mouth Daily., Disp: 90 tablet, Rfl: 3  •  METOPROLOL TARTRATE PO, Take 25 mg by mouth Daily., Disp: , Rfl:   •  rosuvastatin (CRESTOR) 10 MG tablet, Take 10 mg by mouth daily., Disp: , Rfl:     No Known Allergies    Review of Systems   Constitutional: Negative for fever.   HENT: Negative for voice change.    Eyes: Negative for visual disturbance.   Respiratory: Negative for shortness of breath.    Cardiovascular: Negative for chest pain.   Gastrointestinal: Negative for abdominal distention and abdominal pain.   Genitourinary: Negative for dysuria.   Musculoskeletal: Positive for arthralgias. Negative for gait problem and  "joint swelling.   Skin: Negative for rash.   Neurological: Negative for speech difficulty.   Hematological: Does not bruise/bleed easily.   Psychiatric/Behavioral: Negative for confusion.   All other systems reviewed and are negative.      Objective   Resp 18   Ht 177.8 cm (70\")   Wt 95.4 kg (210 lb 4.8 oz)   BMI 30.17 kg/m²    Physical Exam   Constitutional: He is oriented to person, place, and time. He appears well-nourished.   Neck: No tracheal deviation present.   Pulmonary/Chest: No respiratory distress.   Musculoskeletal:        Left knee: He exhibits swelling and abnormal meniscus. He exhibits no effusion, no ecchymosis, no deformity and no erythema. Tenderness found. Medial joint line and MCL tenderness noted. No LCL tenderness noted.        Left ankle: He exhibits normal range of motion and no swelling. No tenderness. Achilles tendon exhibits no pain.   Neurological: He is alert and oriented to person, place, and time.   Skin: Capillary refill takes less than 2 seconds. No rash noted.   Psychiatric: He has a normal mood and affect.   Vitals reviewed.    Left Knee Exam     Range of Motion   Extension: 5   Flexion: 90     Tests   Wiliam:  Medial - positive   Drawer:       Anterior - negative         Other   Erythema: absent  Sensation: normal  Swelling: mild  Effusion: no effusion present           Extremity DVT signs are Negative on physical exam with negative Aracelis sign, with no calf pain, with no palpable cords, with no increased pain with passive stretch/extension and with no skin tone change   Neurologic Exam     Mental Status   Oriented to person, place, and time.      Left Knee Exam     Tenderness   The patient is experiencing tenderness in the medial joint line, MCL, no LCL.               Assessment/Plan   Independent Review of Radiographic Studies:    Shows no acute fracture or dislocation.      Large Joint Arthrocentesis  Date/Time: 8/22/2018 1:45 PM  Consent given by: patient  Site marked: " site marked  Timeout: Immediately prior to procedure a time out was called to verify the correct patient, procedure, equipment, support staff and site/side marked as required   Supporting Documentation  Indications: pain   Procedure Details  Location: knee - L knee  Preparation: Patient was prepped and draped in the usual sterile fashion  Needle size: 22 G  Approach: anterolateral  Medications administered: 2 mL lidocaine 1 %; 40 mg methylPREDNISolone acetate 40 MG/ML  Patient tolerance: patient tolerated the procedure well with no immediate complications             Deny was seen today for pain.    Diagnoses and all orders for this visit:    Arthralgia of left knee  -     XR Knee 1 or 2 View Left  -     Large Joint Arthrocentesis  -     Walker    Knee locking, left  -     Walker    Positive Wiliam sign (meniscus tear) of left knee, initial encounter       Orthopedic activities reviewed and patient expressed appreciation  Discussion of orthopedic goals  Risk, benefits, and merits of treatment alternatives reviewed with the patient and questions answered  Use brace as instructed  Call or notify for any adverse effect from injection therapy  Elevate leg for residual swelling  Reduced physical activity as appropriate  Weight bearing parameters reviewed  Avoid offending activity  Ice, heat, and/or modalities as beneficial    Recommendations/Plan:  Exercise, medications, injections, other patient advice, and return appointment as noted.  Patient is encouraged to call or return for any issues or concerns.    Ice the left knee with a protective skin barrier 20 minutes on 2 hours off at least 3 times per day.  Allow the shot 2 weeks to see if this is effective.  If no relief we'll order an MRI of the left knee continue Tylenol and topical Aspercreme continue wearing the over-the-counter knee brace   Patient agreeable to call or return sooner for any concerns.

## 2018-09-14 ENCOUNTER — OFFICE VISIT (OUTPATIENT)
Dept: ORTHOPEDIC SURGERY | Facility: CLINIC | Age: 63
End: 2018-09-14

## 2018-09-14 VITALS — WEIGHT: 211 LBS | RESPIRATION RATE: 18 BRPM | BODY MASS INDEX: 30.21 KG/M2 | HEIGHT: 70 IN

## 2018-09-14 DIAGNOSIS — M76.52 PATELLAR TENDINITIS, LEFT KNEE: Primary | ICD-10-CM

## 2018-09-14 DIAGNOSIS — M22.42 CHONDROMALACIA OF PATELLOFEMORAL JOINT, LEFT: ICD-10-CM

## 2018-09-14 DIAGNOSIS — S83.242A ACUTE MEDIAL MENISCUS TEAR OF LEFT KNEE, INITIAL ENCOUNTER: ICD-10-CM

## 2018-09-14 PROCEDURE — 99213 OFFICE O/P EST LOW 20 MIN: CPT | Performed by: PHYSICIAN ASSISTANT

## 2018-09-14 RX ORDER — ROSUVASTATIN CALCIUM 40 MG/1
40 TABLET, COATED ORAL DAILY
COMMUNITY

## 2018-09-14 RX ORDER — ISOSORBIDE MONONITRATE 30 MG/1
30 TABLET, EXTENDED RELEASE ORAL DAILY
COMMUNITY
End: 2023-01-09 | Stop reason: ALTCHOICE

## 2018-09-14 RX ORDER — CARVEDILOL 3.12 MG/1
TABLET, FILM COATED ORAL
COMMUNITY
End: 2019-07-07 | Stop reason: HOSPADM

## 2018-09-14 RX ORDER — CETIRIZINE HYDROCHLORIDE 10 MG/1
10 TABLET ORAL DAILY
COMMUNITY

## 2018-09-14 RX ORDER — BUSPIRONE HYDROCHLORIDE 5 MG/1
5 TABLET ORAL 2 TIMES DAILY PRN
COMMUNITY

## 2018-09-14 RX ORDER — ACETAMINOPHEN 500 MG
500 TABLET ORAL EVERY 6 HOURS PRN
COMMUNITY
End: 2023-01-09 | Stop reason: SDUPTHER

## 2018-09-14 NOTE — PROGRESS NOTES
Subjective   Patient ID: Deny Cee is a 62 y.o. right hand dominant male  Follow-up and Pain of the Left Knee (Patient is here today for his mri results, he states his knee does feel better since his last visit.)         History of Present Illness  Patient is following up to review MRI results of the left knee.  He began having left knee pain around Aug 20th 2018 with no known injury or,.  He did washing cleaning his camper which required him to repetitively step up and down on a ladder for his pain began.  He did have an episode of pain and audible pop when he had worked around his garden which required him to bend and squat repetitively during the mid August, 2018 episode.  He was seen in the office had a cortisone injection and advised to use a knee brace and use a walker which she states has helped greatly.  He states he has some stiffness to the knee when he tries to bend fully.  He has minimal pain.  He is continue to ice the knee.                                                 Past Medical History:   Diagnosis Date   • Allergic rhinitis    • Anxiety    • Back pain    • CAD (coronary artery disease)    • Cancer (CMS/HCC)     SKIN CANCER ON NOSE , PRECANCER WITH LAST COLONOSCOPY   • CHF (congestive heart failure) (CMS/HCC)     [facility] [place]. WILL SEE IN JAN. 2018   • Enlarged prostate    • GERD (gastroesophageal reflux disease)    • Heart aneurysm    • Hyperlipidemia    • Hypertension    • Hypogonadism in male    • Kidney cysts    • Kidney stones     PASSED    • Osteoarthritis    • Renal cyst    • Restless leg syndrome    • Sinus problem    • Stroke (CMS/HCC)    • Vertigo    • Vision problem         Past Surgical History:   Procedure Laterality Date   • BACK SURGERY  09/03/2015   • COLONOSCOPY  2006   • COLONOSCOPY N/A 11/28/2017    Procedure: COLONOSCOPY with cold snare polypectomy, cold biopsy polypectomies,  argon thermal ablation, submucosal injection of  normal saline,  and biopsies;  Surgeon: Juliano Manriquez MD;  Location: [facility] ENDOSCOPY;  Service:        Family History   Problem Relation Age of Onset   • Colon cancer Father        Social History     Social History   • Marital status:      Spouse name: N/A   • Number of children: N/A   • Years of education: N/A     Occupational History   • Not on file.     Social History Main Topics   • Smoking status: Former Smoker   • Smokeless tobacco: Never Used      Comment: quit 2014   • Alcohol use No   • Drug use: No   • Sexual activity: Defer     Other Topics Concern   • Not on file     Social History Narrative   • No narrative on file         Current Outpatient Prescriptions:   •  acetaminophen (TYLENOL) 500 MG tablet, Tylenol 500 mg tablet, Disp: , Rfl:   •  amLODIPine-benazepril (LOTREL) 10-20 MG per capsule, Take 1 capsule by mouth Daily., Disp: , Rfl:   •  apixaban (ELIQUIS) 5 MG tablet tablet, Take 5 mg by mouth 2 (Two) Times a Day., Disp: , Rfl:   •  aspirin 81 MG oral suspension, aspirin, Disp: , Rfl:   •  baclofen (LIORESAL) 10 MG/20ML injection, by Intrathecal route 1 (One) Time., Disp: , Rfl:   •  busPIRone (BUSPAR) 10 MG tablet, Take 10 mg by mouth 3 (three) times a day., Disp: , Rfl:   •  busPIRone (BUSPAR) 10 MG tablet, buspirone, Disp: , Rfl:   •  carvedilol (COREG) 1.5625 MG half tablet, carvedilol, Disp: , Rfl:   •  cetirizine (zyrTEC) 10 MG tablet, Take 10 mg by mouth Daily., Disp: , Rfl:   •  Cetirizine HCl (ZYRTEC ALLERGY) 10 MG capsule, Zyrtec 10 mg tablet, Disp: , Rfl:   •  clopidogrel (PLAVIX) 75 MG tablet, Take 75 mg by mouth daily., Disp: , Rfl:   •  famotidine (PEPCID) 20 MG tablet, Take 1 tablet by mouth 2 (Two) Times a Day., Disp: 60 tablet, Rfl: 3  •  finasteride (PROSCAR) 5 MG tablet, Take 1 tablet by mouth Daily., Disp: 90 tablet, Rfl: 3  •  isosorbide mononitrate (IMDUR) 30 MG 24 hr tablet, Isosorbide Mononitrate CR 30 mg tablet,extended release  Every morning, Disp: , Rfl:   •   "METOPROLOL TARTRATE PO, Take 25 mg by mouth Daily., Disp: , Rfl:   •  Omeprazole (PRILOSEC PO), omeprazole, Disp: , Rfl:   •  rosuvastatin (CRESTOR) 10 MG tablet, Take 10 mg by mouth daily., Disp: , Rfl:   •  rosuvastatin (CRESTOR) 10 MG tablet, Crestor 10 mg tablet  Daily, Disp: , Rfl:     No Known Allergies    Review of Systems   Constitutional: Negative for fever.   HENT: Negative for voice change.    Eyes: Negative for visual disturbance.   Respiratory: Negative for shortness of breath.    Cardiovascular: Negative for chest pain.   Gastrointestinal: Negative for abdominal distention and abdominal pain.   Genitourinary: Negative for dysuria.   Musculoskeletal: Positive for arthralgias. Negative for gait problem and joint swelling.   Skin: Negative for rash.   Neurological: Negative for speech difficulty.   Hematological: Does not bruise/bleed easily.   Psychiatric/Behavioral: Negative for confusion.       Objective   Resp 18   Ht 177.8 cm (70\")   Wt 95.7 kg (211 lb)   BMI 30.28 kg/m²    Physical Exam   Constitutional: He is oriented to person, place, and time. He appears well-developed and well-nourished.   Musculoskeletal:        Left knee: He exhibits normal range of motion, no effusion, no ecchymosis, no erythema, normal alignment, no LCL laxity, normal patellar mobility and no MCL laxity. Tenderness found. Medial joint line and patellar tendon tenderness noted.   Neurological: He is alert and oriented to person, place, and time.   Skin: Capillary refill takes less than 2 seconds. No rash noted.   Psychiatric: He has a normal mood and affect.   Vitals reviewed.    Left Knee Exam     Range of Motion   Left knee extension: 3.   Flexion: 110     Tests   Wiliam:  Medial - positive Lateral - negative  Varus: negative  Valgus: negative  Patellar Apprehension: trace    Other   Effusion: no effusion present           Extremity DVT signs are Negative on physical exam with negative Aracelis sign, with no calf pain, " with no palpable cords, with no increased pain with passive stretch/extension and with no skin tone change   Neurologic Exam     Mental Status   Oriented to person, place, and time.      Left Knee Exam     Tenderness   The patient is experiencing tenderness in the medial joint line, patellar tendon.               Assessment/Plan   Independent Review of Radiographic Studies:    No new imaging done today.  I did review the MRI report with the patient in the room.  There is evidence of a posterior medial meniscal tear as well as patellar tendinitis    Procedures       Deny was seen today for follow-up and pain.    Diagnoses and all orders for this visit:    Patellar tendinitis, left knee  -     Ambulatory Referral to Physical Therapy Evaluate and treat, Ortho; Electrotherapy; Iontophoresis (please call office if you need Dexamaethasone); Strengthening, ROM, Stretching    Acute medial meniscus tear of left knee, initial encounter  -     Ambulatory Referral to Physical Therapy Evaluate and treat, Ortho; Electrotherapy; Iontophoresis (please call office if you need Dexamaethasone); Strengthening, ROM, Stretching    Chondromalacia of patellofemoral joint, left  -     Ambulatory Referral to Physical Therapy Evaluate and treat, Ortho; Electrotherapy; Iontophoresis (please call office if you need Dexamaethasone); Strengthening, ROM, Stretching       Orthopedic activities reviewed and patient expressed appreciation  Discussion of orthopedic goals  Risk, benefits, and merits of treatment alternatives reviewed with the patient and questions answered  The nature of the proposed surgery reviewed with the patient including risks, benefits, rehabilitation, recovery timeframe, and outcome expectations  Physical therapy referral given  Reduced physical activity as appropriate  Weight bearing parameters reviewed  Ice, heat, and/or modalities as beneficial    Recommendations/Plan:  Exercise, medications, injections, other patient  advice, and return appointment as noted.  Patient is encouraged to call or return for any issues or concerns.    I discussed with the patient the possible treatment options.  He states at this time he would like to hold off on any type of surgery.  He agrees to enroll in formal physical therapy.  He may repeat the cortisone injection as long as it has been 90 days from the most recent injection  Patient agreeable to call or return sooner for any concerns.

## 2018-10-26 ENCOUNTER — OFFICE VISIT (OUTPATIENT)
Dept: ORTHOPEDIC SURGERY | Facility: CLINIC | Age: 63
End: 2018-10-26

## 2018-10-26 VITALS — BODY MASS INDEX: 30.21 KG/M2 | HEIGHT: 70 IN | WEIGHT: 211 LBS | RESPIRATION RATE: 18 BRPM

## 2018-10-26 DIAGNOSIS — R52 PAIN: Primary | ICD-10-CM

## 2018-10-26 DIAGNOSIS — S92.535A CLOSED NONDISPLACED FRACTURE OF DISTAL PHALANX OF LESSER TOE OF LEFT FOOT, INITIAL ENCOUNTER: ICD-10-CM

## 2018-10-26 DIAGNOSIS — S90.129A CONTUSION OF TOE WITHOUT DAMAGE TO NAIL, UNSPECIFIED TOE, INITIAL ENCOUNTER: ICD-10-CM

## 2018-10-26 PROCEDURE — 99213 OFFICE O/P EST LOW 20 MIN: CPT | Performed by: PHYSICIAN ASSISTANT

## 2018-10-26 RX ORDER — INFLUENZA A VIRUS A/SINGAPORE/GP1908/2015 IVR-180A (H1N1) ANTIGEN (PROPIOLACTONE INACTIVATED), INFLUENZA A VIRUS A/SINGAPORE/INFIMH-16-0019/2016 IVR-186 (H3N2) ANTIGEN (PROPIOLACTONE INACTIVATED), INFLUENZA B VIRUS B/MARYLAND/15/2016 ANTIGEN (PROPIOLACTONE INACTIVATED), AND INFLUENZA B VIRUS B/PHUKET/3073/2013 BVR-1B ANTIGEN (PROPIOLACTONE INACTIVATED) 15; 15; 15; 15 UG/.5ML; UG/.5ML; UG/.5ML; UG/.5ML
INJECTION, SUSPENSION INTRAMUSCULAR
Refills: 0 | COMMUNITY
Start: 2018-10-12 | End: 2019-07-07 | Stop reason: HOSPADM

## 2018-10-26 RX ORDER — CEPHALEXIN 500 MG/1
500 CAPSULE ORAL 3 TIMES DAILY
Qty: 30 CAPSULE | Refills: 0 | Status: SHIPPED | OUTPATIENT
Start: 2018-10-26 | End: 2018-11-09

## 2018-10-26 NOTE — PROGRESS NOTES
Subjective   Patient ID: Deny Cee is a 63 y.o. right hand dominant male  Pain of the Left Foot (Patient states he dropped a 2x6 board on his foot yesterday, his second toe is very bruised, he states the board fell about 6 foot. His pain is 4/10.)         History of Present Illness    Patient presents with complaints of left second toe pain.  He states he dropped a 2 x 6 board on his foot 10/25/2018.  He noticed swelling and bruising to the second toe but continued on about his daily activity.  His wife made him come to the office for an evaluation per the patient.  He states he has very little pain to the toe  He did notice a small cut to the top of the toe which has bled minimally.    Past Medical History:   Diagnosis Date   • Allergic rhinitis    • Anxiety    • Back pain    • CAD (coronary artery disease)    • Cancer (CMS/HCC)     SKIN CANCER ON NOSE , PRECANCER WITH LAST COLONOSCOPY   • CHF (congestive heart failure) (CMS/HCC)     [facility] [place]. WILL SEE IN JAN. 2018   • Enlarged prostate    • GERD (gastroesophageal reflux disease)    • Heart aneurysm    • Hyperlipidemia    • Hypertension    • Hypogonadism in male    • Kidney cysts    • Kidney stones     PASSED    • Osteoarthritis    • Renal cyst    • Restless leg syndrome    • Sinus problem    • Stroke (CMS/HCC)    • Vertigo    • Vision problem         Past Surgical History:   Procedure Laterality Date   • BACK SURGERY  09/03/2015   • COLONOSCOPY  2006   • COLONOSCOPY N/A 11/28/2017    Procedure: COLONOSCOPY with cold snare polypectomy, cold biopsy polypectomies,  argon thermal ablation, submucosal injection of normal saline,  and biopsies;  Surgeon: Juliano Manriquez MD;  Location: [facility] ENDOSCOPY;  Service:        Family History   Problem Relation Age of Onset   • Colon cancer Father        Social History     Social History   • Marital status:      Spouse name: N/A   • Number of children: N/A   • Years of  education: N/A     Occupational History   • Not on file.     Social History Main Topics   • Smoking status: Former Smoker   • Smokeless tobacco: Never Used      Comment: quit 2014   • Alcohol use No   • Drug use: No   • Sexual activity: Defer     Other Topics Concern   • Not on file     Social History Narrative   • No narrative on file         Current Outpatient Prescriptions:   •  acetaminophen (TYLENOL) 500 MG tablet, Tylenol 500 mg tablet, Disp: , Rfl:   •  AFLURIA QUADRIVALENT 0.5 ML suspension prefilled syringe injection, ADM 0.5ML IM UTD, Disp: , Rfl: 0  •  amLODIPine-benazepril (LOTREL) 10-20 MG per capsule, Take 1 capsule by mouth Daily., Disp: , Rfl:   •  apixaban (ELIQUIS) 5 MG tablet tablet, Take 5 mg by mouth 2 (Two) Times a Day., Disp: , Rfl:   •  aspirin 81 MG oral suspension, aspirin, Disp: , Rfl:   •  baclofen (LIORESAL) 10 MG/20ML injection, by Intrathecal route 1 (One) Time., Disp: , Rfl:   •  busPIRone (BUSPAR) 10 MG tablet, Take 10 mg by mouth 3 (three) times a day., Disp: , Rfl:   •  busPIRone (BUSPAR) 10 MG tablet, buspirone, Disp: , Rfl:   •  carvedilol (COREG) 1.5625 MG half tablet, carvedilol, Disp: , Rfl:   •  cephalexin (KEFLEX) 500 MG capsule, Take 1 capsule by mouth 3 (Three) Times a Day., Disp: 30 capsule, Rfl: 0  •  cetirizine (zyrTEC) 10 MG tablet, Take 10 mg by mouth Daily., Disp: , Rfl:   •  Cetirizine HCl (ZYRTEC ALLERGY) 10 MG capsule, Zyrtec 10 mg tablet, Disp: , Rfl:   •  clopidogrel (PLAVIX) 75 MG tablet, Take 75 mg by mouth daily., Disp: , Rfl:   •  famotidine (PEPCID) 20 MG tablet, Take 1 tablet by mouth 2 (Two) Times a Day., Disp: 60 tablet, Rfl: 3  •  finasteride (PROSCAR) 5 MG tablet, Take 1 tablet by mouth Daily., Disp: 90 tablet, Rfl: 3  •  isosorbide mononitrate (IMDUR) 30 MG 24 hr tablet, Isosorbide Mononitrate CR 30 mg tablet,extended release  Every morning, Disp: , Rfl:   •  METOPROLOL TARTRATE PO, Take 25 mg by mouth Daily., Disp: , Rfl:   •  Omeprazole (PRILOSEC  "PO), omeprazole, Disp: , Rfl:   •  rosuvastatin (CRESTOR) 10 MG tablet, Take 10 mg by mouth daily., Disp: , Rfl:   •  rosuvastatin (CRESTOR) 10 MG tablet, Crestor 10 mg tablet  Daily, Disp: , Rfl:     No Known Allergies    Review of Systems   Constitutional: Negative for fever.   HENT: Negative for voice change.    Eyes: Negative for visual disturbance.   Respiratory: Negative for shortness of breath.    Cardiovascular: Negative for chest pain.   Gastrointestinal: Negative for abdominal distention and abdominal pain.   Genitourinary: Negative for dysuria.   Musculoskeletal: Positive for arthralgias. Negative for gait problem and joint swelling.   Skin: Negative for rash.   Neurological: Negative for speech difficulty.   Hematological: Does not bruise/bleed easily.   Psychiatric/Behavioral: Negative for confusion.       Objective   Resp 18   Ht 177.8 cm (70\")   Wt 95.7 kg (211 lb)   BMI 30.28 kg/m²    Physical Exam   Constitutional: He is oriented to person, place, and time. He appears well-nourished.   Pulmonary/Chest: No respiratory distress.   Musculoskeletal:        Left ankle: He exhibits no swelling and no ecchymosis. No tenderness. No lateral malleolus and no medial malleolus tenderness found.        Left foot: There is no tenderness (there is no ttp to the foot. ).   Neurological: He is alert and oriented to person, place, and time.   Skin: Capillary refill takes less than 2 seconds.   Psychiatric: He has a normal mood and affect.   Vitals reviewed.    Ortho Exam   Extremity DVT signs are Negative on physical exam with negative Aracelis sign, with no calf pain, with no palpable cords, with no increased pain with passive stretch/extension and with no skin tone change   Neurologic Exam     Mental Status   Oriented to person, place, and time.      Left Ankle Exam     Tenderness   The patient is experiencing tenderness in the no medial malleolus.           There is a very small superficial skin abrasion to the " top of the second toe.  There is no redness.  There is bruising to the second digit.  There is mild swelling      Assessment/Plan   Independent Review of Radiographic Studies:    X-ray left foot in the office reveals an acute second distal phalanx tuft fracture.    Galdino Barclay was seen today for pain.    Diagnoses and all orders for this visit:    Pain  -     XR Foot 3+ View Left    Contusion of toe without damage to nail, unspecified toe, initial encounter    Closed nondisplaced fracture of distal phalanx of lesser toe of left foot, initial encounter    Other orders  -     cephalexin (KEFLEX) 500 MG capsule; Take 1 capsule by mouth 3 (Three) Times a Day.       Orthopedic activities reviewed and patient expressed appreciation  Discussion of orthopedic goals  Risk, benefits, and merits of treatment alternatives reviewed with the patient and questions answered  Watch for signs and symptoms of infection    Recommendations/Plan:  Patient is encouraged to call or return for any issues or concerns.    I expressed the importance of monitoring the skin abrasion to the top of the toe.      I offered the patient a postop fracture shoe but he politely declined.  We did place a Vaseline gauze over the small abrasion to the left toe and then long linked the second and third digits.  Patient agreeable to call or return sooner for any concerns.

## 2018-11-09 ENCOUNTER — OFFICE VISIT (OUTPATIENT)
Dept: ORTHOPEDIC SURGERY | Facility: CLINIC | Age: 63
End: 2018-11-09

## 2018-11-09 VITALS — BODY MASS INDEX: 30.06 KG/M2 | WEIGHT: 210 LBS | HEIGHT: 70 IN | RESPIRATION RATE: 18 BRPM

## 2018-11-09 DIAGNOSIS — M76.52 PATELLAR TENDINITIS, LEFT KNEE: ICD-10-CM

## 2018-11-09 DIAGNOSIS — M22.42 CHONDROMALACIA OF PATELLOFEMORAL JOINT, LEFT: ICD-10-CM

## 2018-11-09 DIAGNOSIS — Z09 FRACTURE FOLLOW-UP: Primary | ICD-10-CM

## 2018-11-09 DIAGNOSIS — S83.242A ACUTE MEDIAL MENISCUS TEAR OF LEFT KNEE, INITIAL ENCOUNTER: ICD-10-CM

## 2018-11-09 DIAGNOSIS — S92.535A CLOSED NONDISPLACED FRACTURE OF DISTAL PHALANX OF LESSER TOE OF LEFT FOOT, INITIAL ENCOUNTER: ICD-10-CM

## 2018-11-09 PROCEDURE — 99214 OFFICE O/P EST MOD 30 MIN: CPT | Performed by: PHYSICIAN ASSISTANT

## 2018-12-05 ENCOUNTER — OFFICE VISIT (OUTPATIENT)
Dept: ORTHOPEDIC SURGERY | Facility: CLINIC | Age: 63
End: 2018-12-05

## 2018-12-05 VITALS — WEIGHT: 210 LBS | BODY MASS INDEX: 30.06 KG/M2 | RESPIRATION RATE: 18 BRPM | HEIGHT: 70 IN

## 2018-12-05 DIAGNOSIS — M25.562 ARTHRALGIA OF LEFT KNEE: Primary | ICD-10-CM

## 2018-12-05 DIAGNOSIS — M17.12 PRIMARY OSTEOARTHRITIS OF LEFT KNEE: ICD-10-CM

## 2018-12-05 PROCEDURE — 20610 DRAIN/INJ JOINT/BURSA W/O US: CPT | Performed by: PHYSICIAN ASSISTANT

## 2018-12-05 NOTE — PROGRESS NOTES
"Subjective   Deny Cee is a 63 y.o. male here today for injection therapy.    Chief Complaint   Patient presents with   • Left Knee - Injections     Orthovisc #1       Past Medical History:   Diagnosis Date   • Allergic rhinitis    • Anxiety    • Back pain    • CAD (coronary artery disease)    • Cancer (CMS/HCC)     SKIN CANCER ON NOSE , PRECANCER WITH LAST COLONOSCOPY   • CHF (congestive heart failure) (CMS/HCC)     [facility] IN [place]. WILL SEE IN JAN. 2018   • Enlarged prostate    • GERD (gastroesophageal reflux disease)    • Heart aneurysm    • Hyperlipidemia    • Hypertension    • Hypogonadism in male    • Kidney cysts    • Kidney stones     PASSED    • Osteoarthritis    • Renal cyst    • Restless leg syndrome    • Sinus problem    • Stroke (CMS/HCC)    • Vertigo    • Vision problem         Past Surgical History:   Procedure Laterality Date   • BACK SURGERY  09/03/2015   • COLONOSCOPY  2006   • COLONOSCOPY N/A 11/28/2017    Procedure: COLONOSCOPY with cold snare polypectomy, cold biopsy polypectomies,  argon thermal ablation, submucosal injection of normal saline,  and biopsies;  Surgeon: Juliano Manriquez MD;  Location: [facility] ENDOSCOPY;  Service:    • COLONOSCOPY with cold snare polypectomy, cold biopsy polypectomies,  argon thermal ablation, submucosal injection of normal saline,  and biopsies N/A 11/28/2017    Performed by Juliano Manriquez MD at [facility] ENDOSCOPY       No Known Allergies    Objective   Resp 18   Ht 177.8 cm (70\")   Wt 95.3 kg (210 lb)   BMI 30.13 kg/m²    Physical Exam  Skin exam stable with no erythema, ecchymosis or rash. No new swelling. No motor or sensory changes. Distal pulse intact.    Large Joint Arthrocentesis: L knee - PATIENT SUPPLIED MEDICATION   Date/Time: 12/5/2018 1:11 PM  Consent given by: patient  Site marked: site marked  Timeout: Immediately prior to procedure a time out was called to verify the correct patient, procedure, equipment, " support staff and site/side marked as required   Supporting Documentation  Indications: pain   Procedure Details  Location: knee - L knee  Preparation: Patient was prepped and draped in the usual sterile fashion  Needle gauge: 21 G.  Approach: anterolateral  Medications administered: 30 mg Hyaluronan 30 MG/2ML  Patient tolerance: patient tolerated the procedure well with no immediate complications          Assessment/Plan      Diagnosis Plan   1. Arthralgia of left knee  Large Joint Arthrocentesis: L knee    Hyaluronan (ORTHOVISC) injection 30 mg   2. Primary osteoarthritis of left knee         No complications of injection noted.    Discussion of orthopaedic goals and activities and patient and/or guardian expressed appreciation. Call or notify for any adverse effect from injection therapy. Ice, heat, and/or modalities as beneficial. Watch for signs and symptoms of infection.    Recommendations:  Work/Activity Status: May perform usual activities as tolerated. May return to routine exercise and physical work as tolerated. No strenuous activity.  Patient and/or guardian is encouraged to call or return for any issues or concerns.  FU In 1 week      Patient agreeable to call or return sooner for any concerns.

## 2018-12-12 ENCOUNTER — OFFICE VISIT (OUTPATIENT)
Dept: ORTHOPEDIC SURGERY | Facility: CLINIC | Age: 63
End: 2018-12-12

## 2018-12-12 VITALS — WEIGHT: 210 LBS | BODY MASS INDEX: 30.06 KG/M2 | RESPIRATION RATE: 18 BRPM | HEIGHT: 70 IN

## 2018-12-12 DIAGNOSIS — M17.12 PRIMARY OSTEOARTHRITIS OF LEFT KNEE: Primary | ICD-10-CM

## 2018-12-12 PROCEDURE — 20610 DRAIN/INJ JOINT/BURSA W/O US: CPT | Performed by: PHYSICIAN ASSISTANT

## 2018-12-12 NOTE — PROGRESS NOTES
"Subjective   Deny Cee is a 63 y.o. male here today for injection therapy.    Chief Complaint   Patient presents with   • Left Knee - Follow-up, Pain     Patient is here today for his 2nd orthovisc injection.       Past Medical History:   Diagnosis Date   • Allergic rhinitis    • Anxiety    • Back pain    • CAD (coronary artery disease)    • Cancer (CMS/HCC)     SKIN CANCER ON NOSE , PRECANCER WITH LAST COLONOSCOPY   • CHF (congestive heart failure) (CMS/HCC)     [facility] IN [place]. WILL SEE IN JAN. 2018   • Enlarged prostate    • GERD (gastroesophageal reflux disease)    • Heart aneurysm    • Hyperlipidemia    • Hypertension    • Hypogonadism in male    • Kidney cysts    • Kidney stones     PASSED    • Osteoarthritis    • Renal cyst    • Restless leg syndrome    • Sinus problem    • Stroke (CMS/HCC)    • Vertigo    • Vision problem         Past Surgical History:   Procedure Laterality Date   • BACK SURGERY  09/03/2015   • COLONOSCOPY  2006   • COLONOSCOPY N/A 11/28/2017    Procedure: COLONOSCOPY with cold snare polypectomy, cold biopsy polypectomies,  argon thermal ablation, submucosal injection of normal saline,  and biopsies;  Surgeon: Juliano Manriquez MD;  Location: [facility] ENDOSCOPY;  Service:    • COLONOSCOPY with cold snare polypectomy, cold biopsy polypectomies,  argon thermal ablation, submucosal injection of normal saline,  and biopsies N/A 11/28/2017    Performed by Juliano Manriquez MD at [facility] ENDOSCOPY       No Known Allergies    Objective   Resp 18   Ht 177.8 cm (70\")   Wt 95.3 kg (210 lb)   BMI 30.13 kg/m²    Physical Exam  Skin exam stable with no erythema, ecchymosis or rash. No new swelling. No motor or sensory changes. Distal pulse intact.    Large Joint Arthrocentesis: L knee  Date/Time: 12/12/2018 1:14 PM  Consent given by: patient  Site marked: site marked  Timeout: Immediately prior to procedure a time out was called to verify the correct patient, " procedure, equipment, support staff and site/side marked as required   Supporting Documentation  Indications: pain   Procedure Details  Location: knee - L knee  Preparation: Patient was prepped and draped in the usual sterile fashion  Needle size: 20 G  Approach: anterolateral  Medications administered: 30 mg Hyaluronan 30 MG/2ML  Patient tolerance: patient tolerated the procedure well with no immediate complications          Assessment/Plan      Diagnosis Plan   1. Primary osteoarthritis of left knee  Large Joint Arthrocentesis: L knee       No complications of injection noted.    Discussion of orthopaedic goals and activities and patient and/or guardian expressed appreciation. Call or notify for any adverse effect from injection therapy. Ice, heat, and/or modalities as beneficial. Watch for signs and symptoms of infection.    Recommendations:  Work/Activity Status: May perform usual activities as tolerated. May return to routine exercise and physical work as tolerated. No strenuous activity.  Patient and/or guardian is encouraged to call or return for any issues or concerns.  FU in 1 week    Patient agreeable to call or return sooner for any concerns.

## 2018-12-19 ENCOUNTER — OFFICE VISIT (OUTPATIENT)
Dept: ORTHOPEDIC SURGERY | Facility: CLINIC | Age: 63
End: 2018-12-19

## 2018-12-19 VITALS — WEIGHT: 210 LBS | HEIGHT: 70 IN | RESPIRATION RATE: 18 BRPM | BODY MASS INDEX: 30.06 KG/M2

## 2018-12-19 DIAGNOSIS — M17.12 PRIMARY OSTEOARTHRITIS OF LEFT KNEE: Primary | ICD-10-CM

## 2018-12-19 PROCEDURE — 20610 DRAIN/INJ JOINT/BURSA W/O US: CPT | Performed by: PHYSICIAN ASSISTANT

## 2018-12-19 NOTE — PROGRESS NOTES
"Subjective   Deny Cee is a 63 y.o. male here today for injection therapy.    Chief Complaint   Patient presents with   • Left Knee - Injections     Orthovisc #3 - Patient Supplied        Past Medical History:   Diagnosis Date   • Allergic rhinitis    • Anxiety    • Back pain    • CAD (coronary artery disease)    • Cancer (CMS/HCC)     SKIN CANCER ON NOSE , PRECANCER WITH LAST COLONOSCOPY   • CHF (congestive heart failure) (CMS/HCC)     [facility] IN [place]. WILL SEE IN JAN. 2018   • Enlarged prostate    • GERD (gastroesophageal reflux disease)    • Heart aneurysm    • Hyperlipidemia    • Hypertension    • Hypogonadism in male    • Kidney cysts    • Kidney stones     PASSED    • Osteoarthritis    • Renal cyst    • Restless leg syndrome    • Sinus problem    • Stroke (CMS/HCC)    • Vertigo    • Vision problem         Past Surgical History:   Procedure Laterality Date   • BACK SURGERY  09/03/2015   • COLONOSCOPY  2006   • COLONOSCOPY with cold snare polypectomy, cold biopsy polypectomies,  argon thermal ablation, submucosal injection of normal saline,  and biopsies N/A 11/28/2017    Performed by Juliano Manriquez MD at [facility] ENDOSCOPY       No Known Allergies    Objective   Resp 18   Ht 177.8 cm (70\")   Wt 95.3 kg (210 lb)   BMI 30.13 kg/m²    Physical Exam  Skin exam stable with no erythema, ecchymosis or rash. No new swelling. No motor or sensory changes. Distal pulse intact.    Large Joint Arthrocentesis: L knee  Date/Time: 12/19/2018 10:58 AM  Consent given by: patient  Site marked: site marked  Timeout: Immediately prior to procedure a time out was called to verify the correct patient, procedure, equipment, support staff and site/side marked as required   Supporting Documentation  Indications: pain   Procedure Details  Location: knee - L knee  Preparation: Patient was prepped and draped in the usual sterile fashion  Needle size: 22 G  Approach: anteromedial  Medications " administered: 30 mg Hyaluronan 30 MG/2ML  Patient tolerance: patient tolerated the procedure well with no immediate complications          Assessment/Plan      Diagnosis Plan   1. Primary osteoarthritis of left knee         No complications of injection noted.    Discussion of orthopaedic goals and activities and patient and/or guardian expressed appreciation. Call or notify for any adverse effect from injection therapy. Ice, heat, and/or modalities as beneficial. Watch for signs and symptoms of infection.    Recommendations:  Work/Activity Status: May perform usual activities as tolerated. May return to routine exercise and physical work as tolerated. No strenuous activity.  Patient and/or guardian is encouraged to call or return for any issues or concerns.    FU in 4- 6 weeks    Patient agreeable to call or return sooner for any concerns.

## 2019-01-14 RX ORDER — FAMOTIDINE 20 MG/1
20 TABLET, FILM COATED ORAL 2 TIMES DAILY
Qty: 60 TABLET | Refills: 3 | OUTPATIENT
Start: 2019-01-14

## 2019-01-16 ENCOUNTER — OFFICE VISIT (OUTPATIENT)
Dept: ORTHOPEDIC SURGERY | Facility: CLINIC | Age: 64
End: 2019-01-16

## 2019-01-16 VITALS — WEIGHT: 210 LBS | RESPIRATION RATE: 18 BRPM | HEIGHT: 70 IN | BODY MASS INDEX: 30.06 KG/M2

## 2019-01-16 DIAGNOSIS — S83.242A ACUTE MEDIAL MENISCUS TEAR OF LEFT KNEE, INITIAL ENCOUNTER: ICD-10-CM

## 2019-01-16 DIAGNOSIS — M22.42 CHONDROMALACIA OF PATELLOFEMORAL JOINT, LEFT: ICD-10-CM

## 2019-01-16 DIAGNOSIS — M17.12 PRIMARY OSTEOARTHRITIS OF LEFT KNEE: Primary | ICD-10-CM

## 2019-01-16 PROCEDURE — 99213 OFFICE O/P EST LOW 20 MIN: CPT | Performed by: PHYSICIAN ASSISTANT

## 2019-01-16 NOTE — PROGRESS NOTES
Subjective   Patient ID: Deny Cee is a 63 y.o.male  Follow-up and Pain of the Left Knee (Patient states his knee is some better, since the gel injections but says it's  in that one spot and laying in a certain position to long causes pain.)         History of Present Illness  Patient states he is feeling some better after the visco injections. He denies knee instability. Denies locking of left knee.   HE still has some tenderness to inside of knee. He states he feels about 60 % better after the injections.   He has been using treadmill at home and is able to tolerate this.                                                 Past Medical History:   Diagnosis Date   • Allergic rhinitis    • Anxiety    • Back pain    • CAD (coronary artery disease)    • Cancer (CMS/HCC)     SKIN CANCER ON NOSE , PRECANCER WITH LAST COLONOSCOPY   • CHF (congestive heart failure) (CMS/HCC)     [facility] IN [place]. WILL SEE IN JAN. 2018   • Enlarged prostate    • GERD (gastroesophageal reflux disease)    • Heart aneurysm    • Hyperlipidemia    • Hypertension    • Hypogonadism in male    • Kidney cysts    • Kidney stones     PASSED    • Osteoarthritis    • Renal cyst    • Restless leg syndrome    • Sinus problem    • Stroke (CMS/HCC)    • Vertigo    • Vision problem         Past Surgical History:   Procedure Laterality Date   • BACK SURGERY  09/03/2015   • COLONOSCOPY  2006   • COLONOSCOPY N/A 11/28/2017    Procedure: COLONOSCOPY with cold snare polypectomy, cold biopsy polypectomies,  argon thermal ablation, submucosal injection of normal saline,  and biopsies;  Surgeon: Juliano Manriquez MD;  Location: [facility] ENDOSCOPY;  Service:        Family History   Problem Relation Age of Onset   • Colon cancer Father        Social History     Socioeconomic History   • Marital status:      Spouse name: Not on file   • Number of children: Not on file   • Years of education: Not on file   • Highest  education level: Not on file   Social Needs   • Financial resource strain: Not on file   • Food insecurity - worry: Not on file   • Food insecurity - inability: Not on file   • Transportation needs - medical: Not on file   • Transportation needs - non-medical: Not on file   Occupational History   • Not on file   Tobacco Use   • Smoking status: Former Smoker   • Smokeless tobacco: Never Used   • Tobacco comment: quit 2014   Substance and Sexual Activity   • Alcohol use: No   • Drug use: No   • Sexual activity: Defer   Other Topics Concern   • Not on file   Social History Narrative   • Not on file         Current Outpatient Medications:   •  acetaminophen (TYLENOL) 500 MG tablet, Tylenol 500 mg tablet, Disp: , Rfl:   •  AFLURIA QUADRIVALENT 0.5 ML suspension prefilled syringe injection, ADM 0.5ML IM UTD, Disp: , Rfl: 0  •  amLODIPine-benazepril (LOTREL) 10-20 MG per capsule, Take 1 capsule by mouth Daily., Disp: , Rfl:   •  apixaban (ELIQUIS) 5 MG tablet tablet, Take 5 mg by mouth 2 (Two) Times a Day., Disp: , Rfl:   •  aspirin 81 MG oral suspension, aspirin, Disp: , Rfl:   •  baclofen (LIORESAL) 10 MG/20ML injection, by Intrathecal route 1 (One) Time., Disp: , Rfl:   •  busPIRone (BUSPAR) 10 MG tablet, buspirone, Disp: , Rfl:   •  carvedilol (COREG) 1.5625 MG half tablet, carvedilol, Disp: , Rfl:   •  Cetirizine HCl (ZYRTEC ALLERGY) 10 MG capsule, Zyrtec 10 mg tablet, Disp: , Rfl:   •  clopidogrel (PLAVIX) 75 MG tablet, Take 75 mg by mouth daily., Disp: , Rfl:   •  famotidine (PEPCID) 20 MG tablet, Take 1 tablet by mouth 2 (Two) Times a Day., Disp: 60 tablet, Rfl: 3  •  finasteride (PROSCAR) 5 MG tablet, Take 1 tablet by mouth Daily., Disp: 90 tablet, Rfl: 3  •  isosorbide mononitrate (IMDUR) 30 MG 24 hr tablet, Isosorbide Mononitrate CR 30 mg tablet,extended release  Every morning, Disp: , Rfl:   •  METOPROLOL TARTRATE PO, Take 25 mg by mouth Daily., Disp: , Rfl:   •  Omeprazole (PRILOSEC PO), omeprazole, Disp: ,  "Rfl:   •  rosuvastatin (CRESTOR) 10 MG tablet, Crestor 10 mg tablet  Daily, Disp: , Rfl:     No Known Allergies    Review of Systems   Constitutional: Negative for fever.   HENT: Negative for voice change.    Eyes: Negative for visual disturbance.   Respiratory: Negative for shortness of breath.    Cardiovascular: Negative for chest pain.   Gastrointestinal: Negative for abdominal distention and abdominal pain.   Genitourinary: Negative for dysuria.   Musculoskeletal: Positive for arthralgias. Negative for gait problem and joint swelling.   Skin: Negative for rash.   Neurological: Negative for speech difficulty.   Hematological: Does not bruise/bleed easily.   Psychiatric/Behavioral: Negative for confusion.       Objective   Resp 18   Ht 177.8 cm (70\")   Wt 95.3 kg (210 lb)   BMI 30.13 kg/m²    Physical Exam   Constitutional: He is oriented to person, place, and time. He appears well-nourished.   Musculoskeletal:        Left knee: He exhibits no swelling, no effusion, no ecchymosis, no deformity, no erythema, no LCL laxity, normal patellar mobility, normal meniscus and no MCL laxity. Tenderness found. Medial joint line tenderness noted. No lateral joint line, no MCL and no LCL tenderness noted.   Neurological: He is alert and oriented to person, place, and time.   Vitals reviewed.    Left Knee Exam     Range of Motion   Left knee extension: 3.   Left knee flexion: 115.     Tests   Wiliam:  Medial - negative Lateral - negative    Other   Effusion: no effusion present           Extremity DVT signs are Negative by clinical screen.   Neurologic Exam     Mental Status   Oriented to person, place, and time.           + Patella crepitus left knee.    Assessment/Plan   Independent Review of Radiographic Studies:    No new imaging done today.  Reviewed with patient at a prior visit.      Galdino Barclay was seen today for follow-up and pain.    Diagnoses and all orders for this visit:    Primary osteoarthritis " of left knee    Acute medial meniscus tear of left knee, initial encounter    Chondromalacia of patellofemoral joint, left       Orthopedic activities reviewed and patient expressed appreciation  Discussion of orthopedic goals  Risk, benefits, and merits of treatment alternatives reviewed with the patient and questions answered    Recommendations/Plan:  Exercise, medications, injections, other patient advice, and return appointment as noted.  Patient is encouraged to call or return for any issues or concerns.    We discussed that because he is feeling better we will hold off on surgical ATS at this time    Patient agreeable to call or return sooner for any concerns.

## 2019-04-01 ENCOUNTER — OFFICE VISIT (OUTPATIENT)
Dept: PULMONOLOGY | Facility: CLINIC | Age: 64
End: 2019-04-01

## 2019-04-01 VITALS
RESPIRATION RATE: 18 BRPM | DIASTOLIC BLOOD PRESSURE: 70 MMHG | BODY MASS INDEX: 30.64 KG/M2 | SYSTOLIC BLOOD PRESSURE: 110 MMHG | HEIGHT: 70 IN | OXYGEN SATURATION: 98 % | HEART RATE: 71 BPM | WEIGHT: 214 LBS

## 2019-04-01 DIAGNOSIS — G47.19 EXCESSIVE DAYTIME SLEEPINESS: ICD-10-CM

## 2019-04-01 DIAGNOSIS — G47.33 OBSTRUCTIVE SLEEP APNEA: Primary | ICD-10-CM

## 2019-04-01 DIAGNOSIS — E66.9 OBESITY (BMI 30-39.9): ICD-10-CM

## 2019-04-01 PROCEDURE — 99204 OFFICE O/P NEW MOD 45 MIN: CPT | Performed by: INTERNAL MEDICINE

## 2019-04-01 RX ORDER — AMLODIPINE BESYLATE 2.5 MG/1
2.5 TABLET ORAL DAILY
COMMUNITY

## 2019-04-01 NOTE — PROGRESS NOTES
CONSULT NOTE    Requested by:   Lesvia Littlejohn*   Jose Delgado MD      Chief Complaint   Patient presents with   • Consult   • Shortness of Breath       Subjective:  Deny Cee is a 63 y.o. male.     History of Present Illness   Patient came in today for evaluation of possible sleep apnea. Patient says that for the past few years he has woken up in the middle of the night gasping for breath and associated chest pain. He sometimes wakes up with a choking sensation. Also, the patient's family notes that he has occasional pauses in the breathing.     he feels that he doesn't get restful night sleep and his quality has diminished considerably.      he is not complaining of occasional headaches.     There is known family history of sleep apnea, in his sister.      Patient suffers from coronary aneurysms.      he drinks 7-8 cups/cans of caffeinated drinks per day.      The following portions of the patient's history were reviewed and updated as appropriate: allergies, current medications, past family history, past medical history, past social history and past surgical history.    Review of Systems   Constitutional: Positive for fatigue. Negative for chills and fever.   HENT: Positive for ear pain, rhinorrhea and sinus pressure. Negative for sneezing and sore throat.    Respiratory: Positive for cough, chest tightness and shortness of breath. Negative for choking and wheezing.    Cardiovascular: Positive for chest pain and leg swelling. Negative for palpitations.   Psychiatric/Behavioral: Positive for sleep disturbance.   All other systems reviewed and are negative.      Past Medical History:   Diagnosis Date   • Allergic rhinitis    • Anxiety    • Back pain    • CAD (coronary artery disease)    • Cancer (CMS/[facility])     SKIN CANCER ON NOSE , PRECANCER WITH LAST COLONOSCOPY   • CHF (congestive heart failure) (CMS/[facility])     [facility] IN [place]. WILL SEE IN JAN. 2018   •  "Enlarged prostate    • GERD (gastroesophageal reflux disease)    • Heart aneurysm    • Hyperlipidemia    • Hypertension    • Hypogonadism in male    • Kidney cysts    • Kidney stones     PASSED    • Osteoarthritis    • Renal cyst    • Restless leg syndrome    • Sinus problem    • Stroke (CMS/HCC)    • Vertigo    • Vision problem        Social History     Tobacco Use   • Smoking status: Former Smoker   • Smokeless tobacco: Never Used   • Tobacco comment: quit 2014   Substance Use Topics   • Alcohol use: No         Objective:  Visit Vitals  /70   Pulse 71   Resp 18   Ht 177.8 cm (70\")   Wt 97.1 kg (214 lb)   SpO2 98%   BMI 30.71 kg/m²       Physical Exam   Constitutional: He is oriented to person, place, and time. He appears well-developed and well-nourished.   HENT:   Head: Atraumatic.   Crowded Oropharynx.   Dentures.   Eyes: EOM are normal. Pupils are equal, round, and reactive to light.   Neck: No JVD present. No tracheal deviation present. No thyromegaly present.   Increased adipose tissue.    Cardiovascular: Normal rate and regular rhythm.   Pulmonary/Chest: Effort normal and breath sounds normal. No respiratory distress. He has no wheezes.   Musculoskeletal: Normal range of motion. He exhibits no edema.   Gait was normal.   Neurological: He is alert and oriented to person, place, and time.   Skin: Skin is warm and dry.   Psychiatric: He has a normal mood and affect. His behavior is normal.   Vitals reviewed.        Assessment/Plan:  Deny was seen today for consult and shortness of breath.    Diagnoses and all orders for this visit:    Obstructive sleep apnea  -     Cancel: Polysomnography 4 or More Parameters; Future  -     Polysomnography 4 or More Parameters; Future    Excessive daytime sleepiness  -     Cancel: Polysomnography 4 or More Parameters; Future  -     Polysomnography 4 or More Parameters; Future    Obesity (BMI 30-39.9)  -     Cancel: Polysomnography 4 or More Parameters; Future  -     " Polysomnography 4 or More Parameters; Future        Return in about 3 months (around 7/1/2019) for Recheck, Give pt sleep questionairre.    DISCUSSION(if any):  I reviewed the patient's cardiology office his last note that mentions suspicion of sleep apnea.  I also reviewed his last echocardiogram which showed ejection fraction of 55-60%.    ===========================  ===========================    Sleep questionnaire was provided to the patient    The pathophysiology of sleep apnea was discussed, with him.     We will encourage him to schedule the sleep study soon.     The patient will definitely need to be considered for an in lab study due to significant cardiac history and coronary aneurysm among other reasons.  It should be noted that a home sleep study is likely to underestimate the true AHI and is unable to assess sleep stages and abnormal sleep behaviors etc. The patient has understood.    The patient is agreeable to try CPAP/BiPAP, if needed.     Patient was educated on good sleep hygiene measures and voiced understanding of the same.     Patient was given reading material regarding sleep apnea.      Dictated utilizing Dragon dictation.    This document was electronically signed by Loretta Denton MD on 04/01/19 at 10:01 AM

## 2019-04-17 ENCOUNTER — HOSPITAL ENCOUNTER (OUTPATIENT)
Dept: SLEEP MEDICINE | Facility: HOSPITAL | Age: 64
Setting detail: THERAPIES SERIES
End: 2019-04-17

## 2019-04-17 DIAGNOSIS — E66.9 OBESITY (BMI 30-39.9): ICD-10-CM

## 2019-04-17 DIAGNOSIS — G47.33 OBSTRUCTIVE SLEEP APNEA: ICD-10-CM

## 2019-04-17 DIAGNOSIS — G47.19 EXCESSIVE DAYTIME SLEEPINESS: ICD-10-CM

## 2019-04-17 PROCEDURE — 95810 POLYSOM 6/> YRS 4/> PARAM: CPT

## 2019-04-17 PROCEDURE — 95810 POLYSOM 6/> YRS 4/> PARAM: CPT | Performed by: INTERNAL MEDICINE

## 2019-07-08 ENCOUNTER — PROCEDURE VISIT (OUTPATIENT)
Dept: FAMILY MEDICINE CLINIC | Facility: CLINIC | Age: 64
End: 2019-07-08

## 2019-07-08 VITALS
HEIGHT: 71 IN | OXYGEN SATURATION: 95 % | HEART RATE: 70 BPM | SYSTOLIC BLOOD PRESSURE: 112 MMHG | RESPIRATION RATE: 14 BRPM | DIASTOLIC BLOOD PRESSURE: 60 MMHG | BODY MASS INDEX: 29.4 KG/M2 | WEIGHT: 210 LBS

## 2019-07-08 DIAGNOSIS — L02.612 ABSCESS OF LEFT FOOT: Primary | ICD-10-CM

## 2019-07-08 PROCEDURE — 99213 OFFICE O/P EST LOW 20 MIN: CPT | Performed by: FAMILY MEDICINE

## 2019-07-08 PROCEDURE — 10060 I&D ABSCESS SIMPLE/SINGLE: CPT | Performed by: FAMILY MEDICINE

## 2019-07-09 ENCOUNTER — OFFICE VISIT (OUTPATIENT)
Dept: PULMONOLOGY | Facility: CLINIC | Age: 64
End: 2019-07-09

## 2019-07-09 VITALS
DIASTOLIC BLOOD PRESSURE: 60 MMHG | OXYGEN SATURATION: 97 % | SYSTOLIC BLOOD PRESSURE: 105 MMHG | HEIGHT: 71 IN | BODY MASS INDEX: 29.26 KG/M2 | WEIGHT: 209 LBS | RESPIRATION RATE: 18 BRPM | HEART RATE: 64 BPM

## 2019-07-09 DIAGNOSIS — G25.81 RESTLESS LEG SYNDROME: Primary | ICD-10-CM

## 2019-07-09 PROCEDURE — 99213 OFFICE O/P EST LOW 20 MIN: CPT | Performed by: INTERNAL MEDICINE

## 2019-07-09 RX ORDER — ROPINIROLE 1 MG/1
1 TABLET, FILM COATED ORAL NIGHTLY
Qty: 30 TABLET | Refills: 5 | Status: SHIPPED | OUTPATIENT
Start: 2019-07-09 | End: 2019-10-28 | Stop reason: HOSPADM

## 2019-07-09 NOTE — PROGRESS NOTES
"Chief Complaint   Patient presents with   • Follow-up   • Sleeping Problem         Subjective   Barclaytay Cee is a 63 y.o. male.     History of Present Illness   Patient also complains of an urge to move his legs along with occasional tingling sensation. Patient also reports occasions when he gets \"cramps\" and has to rub them off and sometimes walk them off.    He had sleep study performed.       The following portions of the patient's history were reviewed and updated as appropriate: allergies, current medications, past family history, past medical history, past social history and past surgical history.    Review of Systems   Constitutional: Negative for chills and fever.   HENT: Positive for rhinorrhea and sinus pressure. Negative for sneezing and sore throat.    Respiratory: Positive for cough, shortness of breath and wheezing.    Cardiovascular: Positive for chest pain and palpitations.   Psychiatric/Behavioral: Positive for sleep disturbance.       Objective   Visit Vitals  /60   Pulse 64   Resp 18   Ht 180.3 cm (70.98\")   Wt 94.8 kg (209 lb)   SpO2 97%   BMI 29.16 kg/m²       Physical Exam   Constitutional: He is oriented to person, place, and time. He appears well-developed and well-nourished.   Eyes: EOM are normal.   Neck: Neck supple. No JVD present.   Musculoskeletal:   Gait was normal.   Neurological: He is alert and oriented to person, place, and time.   Skin: Skin is warm and dry.   Vitals reviewed.      Assessment/Plan   Deny was seen today for follow-up and sleeping problem.    Diagnoses and all orders for this visit:    Restless leg syndrome    Other orders  -     rOPINIRole (REQUIP) 1 MG tablet; Take 1 tablet by mouth Every Night. Take 1 hour before bedtime.         Return in about 4 months (around 11/9/2019) for Recheck.    DISCUSSION (if any):  His last sleep study was reviewed with him in detail. It showed no sleep apnea with good REM sleep.     Patient's last CBC excludes " significant iron deficiency causing anemia, a potential contributor to symptoms of RLS.    We will start the patient on Requip and adjust the dose according to symptomatic relief.    He was informed about the side effects in great detail.    I spent a total of 20 minutes face to face with this patient. his pertinent current and previous data, as applicable, were reviewed. Patient's diagnostic studies were also reviewed. More than 10 minutes of the time spent, was spent in counseling about patient's underlying disease process, medication compliance (as applicable) and lifestyle modifications that may impact patient's health.         Dictated utilizing Dragon dictation.    This document was electronically signed by Loretta Denton MD on 07/09/19 at 11:57 AM

## 2019-07-09 NOTE — PROGRESS NOTES
Established Patient        Chief Complaint:   Chief Complaint   Patient presents with   • Blisters on foot     Felt like dsomething was in shoe on Friday, Saturday noticed 2 blisters, did find tiny tick on one spot, blisters have gotten worse, went to urgent care and was given Doxycycline, was at PCP in [place] today and was advised needed to be lanced        Deny Rosendo Cee is a 63 y.o. male    History of Present Illness:   Here for evaluation of a left foot abnormality, first noticed approximately 3 days ago.  Patient states he was wearing open sandals while setting up a camper and unfortunately has decreased sensation of bilateral feet of chronic nature.  Patient states approximate the next day he noticed a tick to the similar area.  It was removed intact by his wife.  Patient describes 2 separate areas that are worsened and erythema and swelling.  He denies any fever or chills.  No arthralgia otherwise, however he does have chronic loss of sensation to bilateral feet.  He was seen in after-hours clinic, prescribed initially doxycycline for 7 days, however it is been extended to 14 days at present.    Subjective     The following portions of the patient's history were reviewed and updated as appropriate: allergies, current medications, past family history, past medical history, past social history, past surgical history and problem list.    No Known Allergies    Review of Systems  1. Constitutional: Negative for fever. Negative for chills, diaphoresis, fatigue and unexpected weight change.   2. HENT: No dysphagia; no changes to vision/hearing/smell/taste; no epistaxis  3. Eyes: Negative for redness and visual disturbance.   4. Respiratory: negative for shortness of breath. Negative for chest pain . Negative for cough and chest tightness.   5. Cardiovascular: Negative for chest pain and palpitations.   6. Gastrointestinal: Negative for abdominal distention, abdominal pain and blood in stool.  "  7. Endocrine: Negative for cold intolerance and heat intolerance.   8. Genitourinary: Negative for difficulty urinating, dysuria and frequency.   9. Musculoskeletal: Negative for arthralgias, back pain and myalgias.   10. Skin: As per above.  11. Neurological: Negative for syncope, weakness and headaches.   12. Hematological: Negative for adenopathy. Does not bruise/bleed easily.   13. Psychiatric/Behavioral: Negative for confusion. The patient is not nervous/anxious.    Objective     Physical Exam   Vital Signs: /60   Pulse 70   Resp 14   Ht 180.3 cm (71\")   Wt 95.3 kg (210 lb)   SpO2 95%   BMI 29.29 kg/m²     General Appearance: alert, oriented x 3, no acute distress.  Skin: Enlarged vesicular areas of symmetric size and approximately oriented consistent with probable reptile bite.  HEENT: Atraumatic.  pupils round and reactive to light and accommodation, oral mucosa pink and moist.  Nares patent without epistaxis.  External auditory canals are patent tympanic membranes intact.  Neck: supple, no JVD, trachea midline.  No thyromegaly  Lungs: CTA, unlabored breathing effort.  Heart: RRR, normal S1 and S2, no S3, no rub.  Abdomen: soft, non-tender, no palpable bladder, present bowel sounds to auscultation ×4.  No guarding or rigidity.  Extremities: no clubbing, cyanosis or edema.  Good range of motion actively and passively.  Symmetric muscle strength and development  Neuro: normal speech and mental status.  Cranial nerves II through XII intact.  No anosmia. DTR 2+; proprioception intact.  No focal motor/sensory deficits.    Incision & Drainage  Date/Time: 7/8/2019 5:11 PM  Performed by: Choco Ann DO  Authorized by: Choco Ann DO   Type: abscess  Body area: lower extremity  Location details: left foot    Anesthesia:  Local Anesthetic: lidocaine 1% with epinephrine  Anesthetic total: 1 mL    Sedation:  Patient sedated: no    Scalpel size: 10  Incision type: single straight  Complexity: " simple  Drainage: serosanguinous  Drainage amount: moderate  Wound treatment: wound left open  Packing material: Vaseline gauze  Patient tolerance: Patient tolerated the procedure well with no immediate complications            Assessment and Plan      Assessment:   Deny was seen today for blisters on foot.    Diagnoses and all orders for this visit:    Abscess of left foot  -     Incision & Drainage        Plan:  Finish full course of doxycycline.  Patient tolerated procedure without difficulty.  Wound care discussed in detail, avoidance of exposure to water as long as able.  I do suspect this was a bite of some nature, perhaps even a snake bite.  Did not appear necrotic in nature, rather an inoculation/puncture injury.  If it was a snake, I do suspect a nonvenomous type.  Follow clinically, contact the office should he not improve or worsens.  Discussion Summary:    Discussed plan of care in detail with pt today; pt verb understanding and agrees; counseled for approx 20 min of total 30 min exam time.  Follow up:  No Follow-up on file.     There are no Patient Instructions on file for this visit.    Choco Ann DO  07/09/19  5:13 PM    Please note that portions of this note may have been completed with a voice recognition program. Efforts were made to edit the dictations, but occasionally words are mistranscribed.

## 2019-10-09 ENCOUNTER — OFFICE VISIT (OUTPATIENT)
Dept: GASTROENTEROLOGY | Facility: CLINIC | Age: 64
End: 2019-10-09

## 2019-10-09 VITALS
SYSTOLIC BLOOD PRESSURE: 125 MMHG | DIASTOLIC BLOOD PRESSURE: 54 MMHG | RESPIRATION RATE: 16 BRPM | WEIGHT: 211 LBS | TEMPERATURE: 97.5 F | HEIGHT: 71 IN | BODY MASS INDEX: 29.54 KG/M2 | HEART RATE: 71 BPM

## 2019-10-09 DIAGNOSIS — Z80.0 FAMILY HISTORY OF COLON CANCER: ICD-10-CM

## 2019-10-09 DIAGNOSIS — R12 HEARTBURN: Primary | ICD-10-CM

## 2019-10-09 DIAGNOSIS — R10.31 RIGHT LOWER QUADRANT ABDOMINAL PAIN: ICD-10-CM

## 2019-10-09 DIAGNOSIS — K63.9 LESION OF COLON: ICD-10-CM

## 2019-10-09 DIAGNOSIS — D12.2 ADENOMATOUS POLYP OF ASCENDING COLON: ICD-10-CM

## 2019-10-09 PROCEDURE — 99214 OFFICE O/P EST MOD 30 MIN: CPT | Performed by: INTERNAL MEDICINE

## 2019-10-09 NOTE — PATIENT INSTRUCTIONS
1. Anti-reflex measures.  2. The patient has been advised to stop drinking soda beverages.  3. He has been advised to cut down on coffee intake and limit the coffee to 1 to 2 cups a day.  4. Pepcid-famotidine 20 mg 1 orally twice a day.  5. Clearance from cardiology service regarding heart issues as well as ability to hold off on blood thinners.  6. Colonoscopy: Description of the procedure, risks benefits alternatives and options including non-operative options were discussed with the patient in detail.  The patient understands and wishes to proceed.  7. Upper endoscopy (EGD) counseling:  Description of the procedure, risks, benefits, alternatives and options including nonoperative options were discussed with the patient in detail.  The patient understands and wishes to proceed.

## 2019-10-09 NOTE — PROGRESS NOTES
"Chief Complaint   Patient presents with   • Heartburn   • Colon Cancer Screening     History of Present Illness     The patient has a history of reflux off and on for the last several years.  The reflux is moderately severe.  Symptoms are described as retrosternal burning sensation, and indigestion.  There is history of regurgitative symptoms.  Frequency being several times per week.  The symptoms are worse at night.  The patient takes acid suppressive therapy with breakthrough symptoms.  Of note previously the patient has tried using Prevacid however there was some interaction with other medications and since then the patient has been taking Pepcid.  There is no history of dysphagia or odynophagia.  The patient usually drinks on average one soda a day.  He denies smoking.  The patient drinks about 5 to 6 cups of coffee a day.    There is history of RLQ abdominal pain off and on for the last several years.  The pain is gradual in onset, moderate in severity and aching in character.  Frequency being 2-3 times a day.  The pain may last for a few seconds.  There is no radiation of abdominal pain.  The pain is worsened on exertion-walking.  Rest and stretching helps with the pain.    The patient denies nausea or vomiting.  There is no history of overt GI bleed (Hematemesis, melena or hematochezia).  The patient denies history of liver or pancreatic disease.  There is positive family history of colon cancer (father).     The patient has history of coronary artery disease and \"leaky valves\".  He has been to [facility].  According to the patient medication was stopped for evaluation in terms of \"leaky valves\".   the patient has history of chest pains off and on.  Since 1 of the medications has been stopped the patient has been having more frequent chest pains.  Chest pains are described as lower substernal area at times pressure-like feeling and other times sharp pains.  The pains may or may not be associated with " "exertion.  The patient has woken up with chest pains at night.  There is history of associated sweats.  Nitroglycerin helps.  The patient has been evaluated by cardiology service last being about 2 months ago.  He has been told to have \"microvascular disease\".  The patient is followed up by cardiology service in [facility].  Of note upon review of medical records the patient has history of such chest pains almost on daily basis in the past as well.  The patient is off Ranexa as well.  According to the patient he did not feel much different on Ranexa.    The patient has history of multiple colon polyps including large lesions.  One lesion in the ascending colon measured 3 cm for which the patient had undergone piecemeal endoscopic mucosal resection in October 2016.  Biopsies revealed tubular adenoma.  In November 2017 the patient had undergone a colonoscopy to terminal ileum.  He was found to have chronic vascular ectasia with a tendency to lose blood.  This was treated with thermal ablation using argon plasma coagulator.  The patient was noted to have additional polyps which were removed.  Biopsies revealed tubular adenomata.  One flat lesion behind ileocecal valve was also resected and treated with argon.  Biopsies from the polyps close to previously resected area in the ascending colon also revealed tubular adenoma.    In 2013 the patient had a possible stroke.  Cardiology service however told him that his heart could have stopped for short period of time.  The patient denies recurrence of symptoms since then.     Review of Systems   Constitutional: Positive for fatigue. Negative for appetite change, chills, fever and unexpected weight change.   HENT: Negative for mouth sores, nosebleeds and trouble swallowing.    Eyes: Negative for discharge and redness.   Respiratory: Positive for shortness of breath. Negative for apnea and cough.    Cardiovascular: Positive for chest pain. Negative for palpitations and " leg swelling.   Gastrointestinal: Positive for abdominal pain. Negative for abdominal distention, anal bleeding, blood in stool, constipation, diarrhea, nausea and vomiting.   Endocrine: Negative for cold intolerance, heat intolerance and polydipsia.   Genitourinary: Negative for dysuria, hematuria and urgency.   Musculoskeletal: Negative for arthralgias, joint swelling and myalgias.   Skin: Negative for rash.   Allergic/Immunologic: Negative for food allergies and immunocompromised state.   Neurological: Negative for dizziness, seizures, syncope and headaches.   Hematological: Negative for adenopathy. Does not bruise/bleed easily.   Psychiatric/Behavioral: Negative for dysphoric mood. The patient is not nervous/anxious and is not hyperactive.      Patient Active Problem List   Diagnosis   • BPH (benign prostatic hyperplasia)   • Renal cyst   • Family history of colon cancer   • Colon cancer screening   • Lesion of colon   • History of colon polyps     Past Medical History:   Diagnosis Date   • Allergic rhinitis    • Anxiety    • Back pain    • CAD (coronary artery disease)    • Cancer (CMS/HCC)     SKIN CANCER ON NOSE , PRECANCER WITH LAST COLONOSCOPY   • CHF (congestive heart failure) (CMS/HCC)     [facility] [place] WILL SEE IN JAN. 2018   • Enlarged prostate    • GERD (gastroesophageal reflux disease)    • Heart aneurysm    • History of cataract surgery    • Hyperlipidemia    • Hypertension    • Hypogonadism in male    • Kidney cysts    • Kidney stones     PASSED    • Osteoarthritis    • Renal cyst    • Restless leg syndrome    • Sinus problem    • Stroke (CMS/HCC)    • Vertigo    • Vision problem      Past Surgical History:   Procedure Laterality Date   • BACK SURGERY  09/03/2015   • CARDIAC CATHETERIZATION     • COLONOSCOPY  2006   • COLONOSCOPY N/A 11/28/2017    Procedure: COLONOSCOPY with cold snare polypectomy, cold biopsy polypectomies,  argon thermal ablation, submucosal  injection of normal saline,  and biopsies;  Surgeon: Juliano Manriquez MD;  Location: [facility] ENDOSCOPY;  Service:    • EYE SURGERY     • KNEE SURGERY Left 05/04/2019     Family History   Problem Relation Age of Onset   • Colon cancer Father      Social History     Tobacco Use   • Smoking status: Former Smoker   • Smokeless tobacco: Never Used   • Tobacco comment: quit 2014   Substance Use Topics   • Alcohol use: No       Current Outpatient Medications:   •  acetaminophen (TYLENOL) 500 MG tablet, Tylenol 500 mg tablet, Disp: , Rfl:   •  apixaban (ELIQUIS) 5 MG tablet tablet, Take 5 mg by mouth 2 (Two) Times a Day., Disp: , Rfl:   •  busPIRone (BUSPAR) 10 MG tablet, buspirone, Disp: , Rfl:   •  Cetirizine HCl (ZYRTEC ALLERGY) 10 MG capsule, Zyrtec 10 mg tablet, Disp: , Rfl:   •  clopidogrel (PLAVIX) 75 MG tablet, Take 75 mg by mouth daily., Disp: , Rfl:   •  denosumab (PROLIA) 60 MG/ML solution syringe, Inject  under the skin into the appropriate area as directed Every 6 (Six) Months., Disp: , Rfl:   •  doxycycline (MONODOX) 100 MG capsule, Take 1 capsule by mouth 2 (Two) Times a Day., Disp: 14 capsule, Rfl: 0  •  famotidine (PEPCID) 20 MG tablet, Take 1 tablet by mouth 2 (Two) Times a Day., Disp: 60 tablet, Rfl: 3  •  isosorbide mononitrate (IMDUR) 30 MG 24 hr tablet, Isosorbide Mononitrate CR 30 mg tablet,extended release  Every morning, Disp: , Rfl:   •  METOPROLOL TARTRATE PO, Take 25 mg by mouth Daily., Disp: , Rfl:   •  mupirocin (BACTROBAN) 2 % ointment, Apply topically to the appropriate area on the skin 3 times a day as directed., Disp: 22 g, Rfl: 0  •  rosuvastatin (CRESTOR) 10 MG tablet, Crestor 10 mg tablet  Daily, Disp: , Rfl:   •  amLODIPine (NORVASC) 2.5 MG tablet, Take 2.5 mg by mouth Daily., Disp: , Rfl:   •  rOPINIRole (REQUIP) 1 MG tablet, Take 1 tablet by mouth Every Night. Take 1 hour before bedtime., Disp: 30 tablet, Rfl: 5    No Known Allergies    Blood pressure 125/54, pulse 71, temperature 97.5  "°F (36.4 °C), resp. rate 16, height 180.3 cm (70.98\"), weight 95.7 kg (211 lb).    Physical Exam   Constitutional: He is oriented to person, place, and time. He appears well-developed and well-nourished. No distress.   HENT:   Head: Normocephalic and atraumatic.   Right Ear: Hearing and external ear normal.   Left Ear: Hearing and external ear normal.   Nose: Nose normal.   Mouth/Throat: Oropharynx is clear and moist and mucous membranes are normal. Mucous membranes are not pale, not dry and not cyanotic. No oral lesions. No oropharyngeal exudate.   Eyes: Conjunctivae and EOM are normal. Right eye exhibits no discharge. Left eye exhibits no discharge. No scleral icterus.   Neck: Trachea normal. Neck supple. No JVD present. No edema present. No thyroid mass and no thyromegaly present.   Cardiovascular: Normal rate, regular rhythm and S2 normal. Exam reveals no gallop, no S3 and no friction rub.   Murmur heard.   Systolic murmur is present with a grade of 1/6.  Pulmonary/Chest: Effort normal and breath sounds normal. No respiratory distress. He has no wheezes. He has no rales. He exhibits no tenderness.   Abdominal: Soft. Normal appearance and bowel sounds are normal. He exhibits no distension, no ascites and no mass. There is no splenomegaly or hepatomegaly. There is no tenderness. There is no rigidity, no rebound and no guarding. No hernia.   Musculoskeletal: He exhibits no tenderness or deformity.     Vascular Status -  His right foot exhibits no edema. His left foot exhibits no edema.  Lymphadenopathy:     He has no cervical adenopathy.        Left: No supraclavicular adenopathy present.   Neurological: He is alert and oriented to person, place, and time. He has normal strength. No cranial nerve deficit or sensory deficit. He exhibits normal muscle tone. Coordination normal.   Skin: No rash noted. He is not diaphoretic. No cyanosis. No pallor. Nails show no clubbing.   Psychiatric: He has a normal mood and affect. " His behavior is normal. Judgment and thought content normal.   Nursing note and vitals reviewed.  Stigmata of chronic liver disease:  None.  Asterixis:  None.    Laboratory Testing:  Upon review of medical records:    Dated September 1, 2015 white count 9.2 hemoglobin 14.4 hematocrit 41.7 and platelets 312.  PT 19.6 INR 0.92.  Dated September 1, 2015 sodium 140 potassium 3.8 chloride 108 CO2 21 BUN 8 creatinine 0.7 and glucose 83 calcium 9.0.       Dated June 20, 2017 WBC 9.34 hemoglobin 14.7 hematocrit 44.2 MCV 92.1 and platelet count 286.     Procedures:    Upon review of medical records:     Dated October 5, 2016 the patient underwent a colonoscopy to the terminal ileum which revealed:  Large flat lesion within the ascending colon, flat lesions within the cecum status post mucosal resections and thermal ablation therapy.  Left-sided diverticulosis.  Colon polyps.  Internal hemorrhoids.  Transverse colon polyp, biopsy revealed tubular adenoma.  Ascending colon, flat lesion, biopsy revealed tubular adenoma.  Colon polyp, appendiceal orifice, biopsy revealed tubular adenoma.  Colon, Cecum, lesion Biopsy revealed tubular adenoma.     Dated November 28, 2017 the patient underwent a colonoscopy to the terminal ileum which revealed: Angiodysplasia of the colon with a tendency to ooze blood.  Status post thermal ablation therapy.  Scant diverticular change in the left colon.  Colon polyps.  Somewhat polypoid area behind ileocecal valve.  Biopsied and treated with thermal ablation therapy.  Previously resected site in the ascending colon was noted to be stable.  Ascending colon, from previous biopsy site, biopsies revealed separate fragments of tubular adenoma without high-grade dysplasia.  Fragments of benign colonic mucosa with no specific pathologic diagnosis.  Colon, polypoid area behind ileocecal valve, biopsy revealed fragments of tubular adenoma.  No evidence of high-grade dysplasia or malignancy.  Sigmoid colon  polyp, biopsy revealed hyperplastic polyp.  No evidence of dysplasia or malignancy.  Rectal polyps, biopsy revealed hyperplastic polyp.  No evidence of dysplasia or malignancy.     Assessment:      ICD-10-CM ICD-9-CM   1. Heartburn R12 787.1   2. Right lower quadrant abdominal pain R10.31 789.03   3. Adenomatous polyp of ascending colon D12.2 211.3   4. Lesion of colon K63.9 569.89   5. Family history of colon cancer Z80.0 V16.0         Discussion:  1.     Plan/  Patient Instructions   1. Anti-reflex measures.  2. The patient has been advised to stop drinking soda beverages.  3. He has been advised to cut down on coffee intake and limit the coffee to 1 to 2 cups a day.  4. Pepcid-famotidine 20 mg 1 orally twice a day.  5. Clearance from cardiology service regarding heart issues as well as ability to hold off on blood thinners.  6. Colonoscopy: Description of the procedure, risks benefits alternatives and options including non-operative options were discussed with the patient in detail.  The patient understands and wishes to proceed.  7. Upper endoscopy (EGD) counseling:  Description of the procedure, risks, benefits, alternatives and options including nonoperative options were discussed with the patient in detail.  The patient understands and wishes to proceed.         Juliano Manriquez MD

## 2019-10-24 ENCOUNTER — PREP FOR SURGERY (OUTPATIENT)
Dept: OTHER | Facility: HOSPITAL | Age: 64
End: 2019-10-24

## 2019-10-24 DIAGNOSIS — D12.2 ADENOMATOUS POLYP OF ASCENDING COLON: ICD-10-CM

## 2019-10-24 DIAGNOSIS — Z12.11 COLON CANCER SCREENING: ICD-10-CM

## 2019-10-24 DIAGNOSIS — R10.31 RLQ ABDOMINAL PAIN: ICD-10-CM

## 2019-10-24 DIAGNOSIS — R12 HEARTBURN: Primary | ICD-10-CM

## 2019-10-24 DIAGNOSIS — Z80.0 FAMILY HISTORY OF COLON CANCER: Primary | ICD-10-CM

## 2019-10-24 DIAGNOSIS — K63.9 LESION OF COLON: ICD-10-CM

## 2019-10-24 RX ORDER — SODIUM CHLORIDE 9 MG/ML
70 INJECTION, SOLUTION INTRAVENOUS CONTINUOUS PRN
Status: CANCELLED | OUTPATIENT
Start: 2019-10-24

## 2019-10-24 RX ORDER — LEVOFLOXACIN 5 MG/ML
500 INJECTION, SOLUTION INTRAVENOUS ONCE
Status: CANCELLED | OUTPATIENT
Start: 2019-10-24 | End: 2019-10-24

## 2019-10-25 PROBLEM — R10.31 RLQ ABDOMINAL PAIN: Status: ACTIVE | Noted: 2019-10-25

## 2019-10-25 PROBLEM — R12 HEARTBURN: Status: ACTIVE | Noted: 2019-10-25

## 2019-10-25 PROBLEM — D12.2 ADENOMATOUS POLYP OF ASCENDING COLON: Status: ACTIVE | Noted: 2019-10-25

## 2019-10-28 ENCOUNTER — ANESTHESIA EVENT (OUTPATIENT)
Dept: GASTROENTEROLOGY | Facility: HOSPITAL | Age: 64
End: 2019-10-28

## 2019-10-28 ENCOUNTER — HOSPITAL ENCOUNTER (OUTPATIENT)
Facility: HOSPITAL | Age: 64
Setting detail: HOSPITAL OUTPATIENT SURGERY
Discharge: HOME | End: 2019-10-28
Attending: INTERNAL MEDICINE | Admitting: INTERNAL MEDICINE

## 2019-10-28 ENCOUNTER — ANESTHESIA (OUTPATIENT)
Dept: GASTROENTEROLOGY | Facility: HOSPITAL | Age: 64
End: 2019-10-28

## 2019-10-28 VITALS
DIASTOLIC BLOOD PRESSURE: 86 MMHG | SYSTOLIC BLOOD PRESSURE: 123 MMHG | WEIGHT: 210 LBS | RESPIRATION RATE: 16 BRPM | OXYGEN SATURATION: 99 % | TEMPERATURE: 97.1 F | BODY MASS INDEX: 30.06 KG/M2 | HEART RATE: 64 BPM | HEIGHT: 70 IN

## 2019-10-28 DIAGNOSIS — R10.31 RLQ ABDOMINAL PAIN: ICD-10-CM

## 2019-10-28 DIAGNOSIS — Z80.0 FAMILY HISTORY OF COLON CANCER: ICD-10-CM

## 2019-10-28 DIAGNOSIS — Z12.11 COLON CANCER SCREENING: ICD-10-CM

## 2019-10-28 DIAGNOSIS — K63.9 LESION OF COLON: ICD-10-CM

## 2019-10-28 DIAGNOSIS — D12.2 ADENOMATOUS POLYP OF ASCENDING COLON: ICD-10-CM

## 2019-10-28 PROCEDURE — 25010000002 ONDANSETRON PER 1 MG: Performed by: NURSE ANESTHETIST, CERTIFIED REGISTERED

## 2019-10-28 PROCEDURE — 45382 COLONOSCOPY W/CONTROL BLEED: CPT | Performed by: INTERNAL MEDICINE

## 2019-10-28 PROCEDURE — 45385 COLONOSCOPY W/LESION REMOVAL: CPT | Performed by: INTERNAL MEDICINE

## 2019-10-28 PROCEDURE — 25010000002 PROPOFOL 200 MG/20ML EMULSION: Performed by: NURSE ANESTHETIST, CERTIFIED REGISTERED

## 2019-10-28 PROCEDURE — 45388 COLONOSCOPY W/ABLATION: CPT | Performed by: INTERNAL MEDICINE

## 2019-10-28 PROCEDURE — 45380 COLONOSCOPY AND BIOPSY: CPT | Performed by: INTERNAL MEDICINE

## 2019-10-28 PROCEDURE — 45381 COLONOSCOPY SUBMUCOUS NJX: CPT | Performed by: INTERNAL MEDICINE

## 2019-10-28 PROCEDURE — 25010000002 AMPICILLIN PER 500 MG: Performed by: INTERNAL MEDICINE

## 2019-10-28 PROCEDURE — 25010000002 DEXAMETHASONE PER 1 MG: Performed by: NURSE ANESTHETIST, CERTIFIED REGISTERED

## 2019-10-28 PROCEDURE — 25010000002 LEVOFLOXACIN PER 250 MG: Performed by: INTERNAL MEDICINE

## 2019-10-28 DEVICE — DEV CLIP GI QUICKCLIPPRO FIX ROT 2300MM: Type: IMPLANTABLE DEVICE | Status: FUNCTIONAL

## 2019-10-28 RX ORDER — LEVOFLOXACIN 500 MG/1
500 TABLET, FILM COATED ORAL DAILY
Qty: 6 TABLET | Refills: 0 | Status: SHIPPED | OUTPATIENT
Start: 2019-10-29 | End: 2019-11-14

## 2019-10-28 RX ORDER — KETAMINE HCL IN NACL, ISO-OSM 100MG/10ML
SYRINGE (ML) INJECTION AS NEEDED
Status: DISCONTINUED | OUTPATIENT
Start: 2019-10-28 | End: 2019-10-28 | Stop reason: SURG

## 2019-10-28 RX ORDER — SODIUM CHLORIDE 9 MG/ML
70 INJECTION, SOLUTION INTRAVENOUS CONTINUOUS PRN
Status: DISCONTINUED | OUTPATIENT
Start: 2019-10-28 | End: 2019-10-28 | Stop reason: HOSPADM

## 2019-10-28 RX ORDER — PROPOFOL 10 MG/ML
INJECTION, EMULSION INTRAVENOUS AS NEEDED
Status: DISCONTINUED | OUTPATIENT
Start: 2019-10-28 | End: 2019-10-28 | Stop reason: SURG

## 2019-10-28 RX ORDER — LIDOCAINE HYDROCHLORIDE 20 MG/ML
INJECTION, SOLUTION INTRAVENOUS AS NEEDED
Status: DISCONTINUED | OUTPATIENT
Start: 2019-10-28 | End: 2019-10-28 | Stop reason: SURG

## 2019-10-28 RX ORDER — METRONIDAZOLE 250 MG/1
250 TABLET ORAL 4 TIMES DAILY
Qty: 21 TABLET | Refills: 0 | Status: SHIPPED | OUTPATIENT
Start: 2019-10-28 | End: 2019-11-14

## 2019-10-28 RX ORDER — LEVOFLOXACIN 5 MG/ML
500 INJECTION, SOLUTION INTRAVENOUS ONCE
Status: COMPLETED | OUTPATIENT
Start: 2019-10-28 | End: 2019-10-28

## 2019-10-28 RX ORDER — DEXAMETHASONE SODIUM PHOSPHATE 4 MG/ML
INJECTION, SOLUTION INTRA-ARTICULAR; INTRALESIONAL; INTRAMUSCULAR; INTRAVENOUS; SOFT TISSUE AS NEEDED
Status: DISCONTINUED | OUTPATIENT
Start: 2019-10-28 | End: 2019-10-28 | Stop reason: SURG

## 2019-10-28 RX ORDER — ONDANSETRON 2 MG/ML
INJECTION INTRAMUSCULAR; INTRAVENOUS AS NEEDED
Status: DISCONTINUED | OUTPATIENT
Start: 2019-10-28 | End: 2019-10-28 | Stop reason: SURG

## 2019-10-28 RX ADMIN — PROPOFOL 50 MG: 10 INJECTION, EMULSION INTRAVENOUS at 11:38

## 2019-10-28 RX ADMIN — PROPOFOL 80 MG: 10 INJECTION, EMULSION INTRAVENOUS at 10:53

## 2019-10-28 RX ADMIN — PROPOFOL 50 MG: 10 INJECTION, EMULSION INTRAVENOUS at 12:00

## 2019-10-28 RX ADMIN — Medication 20 MG: at 12:57

## 2019-10-28 RX ADMIN — SODIUM CHLORIDE: 9 INJECTION, SOLUTION INTRAVENOUS at 11:25

## 2019-10-28 RX ADMIN — PROPOFOL 20 MG: 10 INJECTION, EMULSION INTRAVENOUS at 12:47

## 2019-10-28 RX ADMIN — DEXAMETHASONE SODIUM PHOSPHATE 4 MG: 4 INJECTION, SOLUTION INTRAMUSCULAR; INTRAVENOUS at 13:30

## 2019-10-28 RX ADMIN — PROPOFOL 50 MG: 10 INJECTION, EMULSION INTRAVENOUS at 13:00

## 2019-10-28 RX ADMIN — LIDOCAINE HYDROCHLORIDE 40 MG: 20 INJECTION, SOLUTION INTRAVENOUS at 10:53

## 2019-10-28 RX ADMIN — PROPOFOL 50 MG: 10 INJECTION, EMULSION INTRAVENOUS at 12:34

## 2019-10-28 RX ADMIN — PROPOFOL 50 MG: 10 INJECTION, EMULSION INTRAVENOUS at 11:28

## 2019-10-28 RX ADMIN — PROPOFOL 50 MG: 10 INJECTION, EMULSION INTRAVENOUS at 11:57

## 2019-10-28 RX ADMIN — PROPOFOL 50 MG: 10 INJECTION, EMULSION INTRAVENOUS at 11:34

## 2019-10-28 RX ADMIN — LEVOFLOXACIN 500 MG: 5 INJECTION, SOLUTION INTRAVENOUS at 09:18

## 2019-10-28 RX ADMIN — PROPOFOL 50 MG: 10 INJECTION, EMULSION INTRAVENOUS at 11:51

## 2019-10-28 RX ADMIN — ONDANSETRON 4 MG: 2 INJECTION INTRAMUSCULAR; INTRAVENOUS at 13:29

## 2019-10-28 RX ADMIN — PROPOFOL 50 MG: 10 INJECTION, EMULSION INTRAVENOUS at 11:41

## 2019-10-28 RX ADMIN — PROPOFOL 50 MG: 10 INJECTION, EMULSION INTRAVENOUS at 13:14

## 2019-10-28 RX ADMIN — PROPOFOL 50 MG: 10 INJECTION, EMULSION INTRAVENOUS at 11:09

## 2019-10-28 RX ADMIN — PROPOFOL 50 MG: 10 INJECTION, EMULSION INTRAVENOUS at 11:12

## 2019-10-28 RX ADMIN — PROPOFOL 20 MG: 10 INJECTION, EMULSION INTRAVENOUS at 10:55

## 2019-10-28 RX ADMIN — PROPOFOL 50 MG: 10 INJECTION, EMULSION INTRAVENOUS at 10:58

## 2019-10-28 RX ADMIN — PROPOFOL 50 MG: 10 INJECTION, EMULSION INTRAVENOUS at 13:05

## 2019-10-28 RX ADMIN — PROPOFOL 20 MG: 10 INJECTION, EMULSION INTRAVENOUS at 12:51

## 2019-10-28 RX ADMIN — PROPOFOL 20 MG: 10 INJECTION, EMULSION INTRAVENOUS at 12:58

## 2019-10-28 RX ADMIN — PROPOFOL 50 MG: 10 INJECTION, EMULSION INTRAVENOUS at 11:48

## 2019-10-28 RX ADMIN — PROPOFOL 20 MG: 10 INJECTION, EMULSION INTRAVENOUS at 12:40

## 2019-10-28 RX ADMIN — PROPOFOL 50 MG: 10 INJECTION, EMULSION INTRAVENOUS at 12:08

## 2019-10-28 RX ADMIN — PROPOFOL 50 MG: 10 INJECTION, EMULSION INTRAVENOUS at 12:19

## 2019-10-28 RX ADMIN — PROPOFOL 50 MG: 10 INJECTION, EMULSION INTRAVENOUS at 11:54

## 2019-10-28 RX ADMIN — AMPICILLIN SODIUM 2 G: 2 INJECTION, POWDER, FOR SOLUTION INTRAMUSCULAR; INTRAVENOUS at 10:04

## 2019-10-28 RX ADMIN — PROPOFOL 50 MG: 10 INJECTION, EMULSION INTRAVENOUS at 11:23

## 2019-10-28 RX ADMIN — PROPOFOL 50 MG: 10 INJECTION, EMULSION INTRAVENOUS at 11:06

## 2019-10-28 RX ADMIN — PROPOFOL 50 MG: 10 INJECTION, EMULSION INTRAVENOUS at 13:19

## 2019-10-28 RX ADMIN — PROPOFOL 50 MG: 10 INJECTION, EMULSION INTRAVENOUS at 13:09

## 2019-10-28 RX ADMIN — PROPOFOL 50 MG: 10 INJECTION, EMULSION INTRAVENOUS at 11:44

## 2019-10-28 RX ADMIN — PROPOFOL 20 MG: 10 INJECTION, EMULSION INTRAVENOUS at 12:54

## 2019-10-28 RX ADMIN — PROPOFOL 50 MG: 10 INJECTION, EMULSION INTRAVENOUS at 12:27

## 2019-10-28 RX ADMIN — PROPOFOL 50 MG: 10 INJECTION, EMULSION INTRAVENOUS at 11:15

## 2019-10-28 RX ADMIN — SODIUM CHLORIDE 70 ML/HR: 9 INJECTION, SOLUTION INTRAVENOUS at 09:18

## 2019-10-28 RX ADMIN — PROPOFOL 50 MG: 10 INJECTION, EMULSION INTRAVENOUS at 11:02

## 2019-10-28 RX ADMIN — PROPOFOL 50 MG: 10 INJECTION, EMULSION INTRAVENOUS at 12:14

## 2019-10-28 NOTE — ANESTHESIA POSTPROCEDURE EVALUATION
Patient: Deny Cee    Procedure Summary     Date:  10/28/19 Room / Location:  [facility] ENDOSCOPY 2 / [facility] ENDOSCOPY    Anesthesia Start:  1050 Anesthesia Stop:  1336    Procedure:  COLONOSCOPY WITH COLD SNARE POLYPECTOMY, SUBMUCOSAL INJECTION OF NORMAL SALINE, HOT SNARE POLYPECTOMY, INJECTION OF EPI, THERMAL ABLATION USING ARGON, COLD FORCEP POLYPECTOMY, QUICK CLIP PRO PLACEMENT X 16 (N/A Anus) Diagnosis:       Diverticulosis      Colon polyps      Lesion of colon      (Family history of colon cancer [Z80.0])      (RLQ abdominal pain [R10.31])      (Colon cancer screening [Z12.11])      (Lesion of colon [K63.9])      (Adenomatous polyp of ascending colon [D12.2])    Surgeon:  Juliano Manriquez MD Provider:  Jacob Carreno CRNA    Anesthesia Type:  MAC ASA Status:  3          Anesthesia Type: MAC  Last vitals  BP   123/86 (10/28/19 1500)   Temp   97.1 °F (36.2 °C) (10/28/19 1415)   Pulse   64 (10/28/19 1500)   Resp   16 (10/28/19 1500)     SpO2   99 % (10/28/19 1500)     Post Anesthesia Care and Evaluation    Patient location during evaluation: bedside  Patient participation: complete - patient participated  Level of consciousness: awake and sleepy but conscious  Pain management: adequate  Airway patency: patent  Anesthetic complications: No anesthetic complications  PONV Status: none  Cardiovascular status: acceptable and hemodynamically stable  Respiratory status: acceptable, room air, nonlabored ventilation and spontaneous ventilation  Hydration status: acceptable

## 2019-10-28 NOTE — ANESTHESIA PREPROCEDURE EVALUATION
Anesthesia Evaluation     Patient summary reviewed and Nursing notes reviewed   NPO Solid Status: > 8 hours  NPO Liquid Status: > 8 hours           Airway   Mallampati: II  TM distance: <3 FB  Neck ROM: full  no difficulty expected  Dental - normal exam     Pulmonary - normal exam   (+) a smoker Former,   Cardiovascular - normal exam  Exercise tolerance: good (4-7 METS)    PT is on anticoagulation therapy  Patient on routine beta blocker and Beta blocker given within 24 hours of surgery  Rhythm: regular  Rate: normal    (+) hypertension, valvular problems/murmurs MR, CAD, dysrhythmias Bradycardia, CHF, hyperlipidemia,     ROS comment: Pt takes Eliquis and Plavix    2/13/2019   ECHO  EF 55-60% mild Mitral regurgitation    Neuro/Psych  (+) CVA, dizziness/light headedness, psychiatric history Anxiety,     GI/Hepatic/Renal/Endo    (+)  GERD,  renal disease,     ROS Comment: Hx of renal cysts    Musculoskeletal     (+) back pain,   Abdominal  - normal exam    Bowel sounds: normal.   Substance History - negative use  (-) alcohol use, drug use     OB/GYN negative ob/gyn ROS         Other   (+) arthritis   history of cancer    ROS/Med Hx Other: BPH (benign prostatic hyperplasia)  Renal cyst  Family history of colon cancer  Colon cancer screening  Lesion of colon  History of colon polyps  RLQ abdominal pain  Adenomatous polyp of ascending colon  Heartburn    Heart aneurysm x 3  Enlarged prostate  RLS                        Anesthesia Plan    ASA 3     MAC   (Patient advised that intravenous sedation would be utilized as primary anesthetic technique. Every effort will be made to make sure patient is comfortable. Patient advised that they may experience recall of events of the procedure. Patient verbalized understanding and agreed to plan. )  intravenous induction   Anesthetic plan, all risks, benefits, and alternatives have been provided, discussed and informed consent has been obtained with: patient.    Plan discussed with  CRNA.

## 2019-10-29 ENCOUNTER — HOSPITAL ENCOUNTER (OUTPATIENT)
Facility: HOSPITAL | Age: 64
Setting detail: HOSPITAL OUTPATIENT SURGERY
Discharge: HOME | End: 2019-10-29
Attending: INTERNAL MEDICINE | Admitting: INTERNAL MEDICINE

## 2019-10-29 ENCOUNTER — ANESTHESIA (OUTPATIENT)
Dept: GASTROENTEROLOGY | Facility: HOSPITAL | Age: 64
End: 2019-10-29

## 2019-10-29 ENCOUNTER — ANESTHESIA EVENT (OUTPATIENT)
Dept: GASTROENTEROLOGY | Facility: HOSPITAL | Age: 64
End: 2019-10-29

## 2019-10-29 VITALS
RESPIRATION RATE: 18 BRPM | DIASTOLIC BLOOD PRESSURE: 68 MMHG | SYSTOLIC BLOOD PRESSURE: 129 MMHG | TEMPERATURE: 98.1 F | OXYGEN SATURATION: 97 % | HEART RATE: 55 BPM

## 2019-10-29 DIAGNOSIS — R12 HEARTBURN: ICD-10-CM

## 2019-10-29 PROCEDURE — 25010000002 PROPOFOL 200 MG/20ML EMULSION: Performed by: NURSE ANESTHETIST, CERTIFIED REGISTERED

## 2019-10-29 PROCEDURE — C1726 CATH, BAL DIL, NON-VASCULAR: HCPCS | Performed by: INTERNAL MEDICINE

## 2019-10-29 PROCEDURE — 43249 ESOPH EGD DILATION <30 MM: CPT | Performed by: INTERNAL MEDICINE

## 2019-10-29 PROCEDURE — 43239 EGD BIOPSY SINGLE/MULTIPLE: CPT | Performed by: INTERNAL MEDICINE

## 2019-10-29 PROCEDURE — 25010000002 AMPICILLIN PER 500 MG: Performed by: INTERNAL MEDICINE

## 2019-10-29 PROCEDURE — 25010000002 ONDANSETRON PER 1 MG: Performed by: NURSE ANESTHETIST, CERTIFIED REGISTERED

## 2019-10-29 RX ORDER — SODIUM CHLORIDE 9 MG/ML
70 INJECTION, SOLUTION INTRAVENOUS CONTINUOUS PRN
Status: DISCONTINUED | OUTPATIENT
Start: 2019-10-29 | End: 2019-10-29 | Stop reason: HOSPADM

## 2019-10-29 RX ORDER — ONDANSETRON 2 MG/ML
INJECTION INTRAMUSCULAR; INTRAVENOUS AS NEEDED
Status: DISCONTINUED | OUTPATIENT
Start: 2019-10-29 | End: 2019-10-29 | Stop reason: SURG

## 2019-10-29 RX ORDER — PROPOFOL 10 MG/ML
INJECTION, EMULSION INTRAVENOUS AS NEEDED
Status: DISCONTINUED | OUTPATIENT
Start: 2019-10-29 | End: 2019-10-29 | Stop reason: SURG

## 2019-10-29 RX ORDER — KETAMINE HYDROCHLORIDE 50 MG/ML
INJECTION, SOLUTION, CONCENTRATE INTRAMUSCULAR; INTRAVENOUS AS NEEDED
Status: DISCONTINUED | OUTPATIENT
Start: 2019-10-29 | End: 2019-10-29 | Stop reason: SURG

## 2019-10-29 RX ORDER — PANTOPRAZOLE SODIUM 40 MG/1
TABLET, DELAYED RELEASE ORAL
Qty: 30 TABLET | Refills: 3 | Status: SHIPPED | OUTPATIENT
Start: 2019-10-29 | End: 2020-12-01 | Stop reason: SDUPTHER

## 2019-10-29 RX ADMIN — KETAMINE HYDROCHLORIDE 10 MG: 50 INJECTION, SOLUTION INTRAMUSCULAR; INTRAVENOUS at 07:36

## 2019-10-29 RX ADMIN — PROPOFOL 50 MG: 10 INJECTION, EMULSION INTRAVENOUS at 07:41

## 2019-10-29 RX ADMIN — SODIUM CHLORIDE 70 ML/HR: 9 INJECTION, SOLUTION INTRAVENOUS at 06:40

## 2019-10-29 RX ADMIN — ONDANSETRON 4 MG: 2 INJECTION INTRAMUSCULAR; INTRAVENOUS at 07:50

## 2019-10-29 RX ADMIN — AMPICILLIN SODIUM 2 G: 2 INJECTION, POWDER, FOR SOLUTION INTRAMUSCULAR; INTRAVENOUS at 06:41

## 2019-10-29 RX ADMIN — PROPOFOL 50 MG: 10 INJECTION, EMULSION INTRAVENOUS at 07:46

## 2019-10-29 RX ADMIN — PROPOFOL 50 MG: 10 INJECTION, EMULSION INTRAVENOUS at 07:37

## 2019-10-29 NOTE — ANESTHESIA POSTPROCEDURE EVALUATION
Patient: Deny Cee    Procedure Summary     Date:  10/29/19 Room / Location:  [facility] ENDOSCOPY 2 / [facility] ENDOSCOPY    Anesthesia Start:  0727 Anesthesia Stop:  0754    Procedure:  ESOPHAGOGASTRODUODENOSCOPY WITH BIOPSY AND COLD BIOPSY POLYPECTOMY, ESOPHAGEAL DILATATION (N/A Esophagus) Diagnosis:       Heartburn      (Heartburn [R12])    Surgeon:  Juliano Manriquez MD Provider:  Roel Pereira CRNA    Anesthesia Type:  MAC ASA Status:  3          Anesthesia Type: MAC  Last vitals  BP   129/68 (10/29/19 0825)   Temp   98.1 °F (36.7 °C) (10/29/19 0755)   Pulse   55 (10/29/19 0825)   Resp   18 (10/29/19 0825)     SpO2   97 % (10/29/19 0825)     Post Anesthesia Care and Evaluation    Patient location during evaluation: bedside  Patient participation: complete - patient participated  Level of consciousness: awake and alert  Pain score: 0  Pain management: satisfactory to patient  Airway patency: patent  Anesthetic complications: No anesthetic complications  PONV Status: none  Cardiovascular status: acceptable and hemodynamically stable  Respiratory status: acceptable  Hydration status: acceptable

## 2019-10-29 NOTE — ANESTHESIA PREPROCEDURE EVALUATION
Anesthesia Evaluation     Patient summary reviewed and Nursing notes reviewed   NPO Solid Status: > 8 hours  NPO Liquid Status: > 8 hours           Airway   Mallampati: I  TM distance: >3 FB  Neck ROM: full  No difficulty expected  Dental - normal exam     Pulmonary - negative pulmonary ROS and normal exam   Cardiovascular - normal exam  Exercise tolerance: good (4-7 METS)    Patient on routine beta blocker and Beta blocker given within 24 hours of surgery    (+) hypertension, valvular problems/murmurs, CAD, CHF, hyperlipidemia,       Neuro/Psych  (+) CVA, dizziness/light headedness, psychiatric history,     GI/Hepatic/Renal/Endo    (+) obesity,  GERD,  renal disease stones,     Musculoskeletal     (+) back pain,   Abdominal  - normal exam    Bowel sounds: normal.   Substance History - negative use     OB/GYN negative ob/gyn ROS         Other   (+) arthritis   history of cancer    ROS/Med Hx Other: ROS/Med Hx Other: BPH (benign prostatic hyperplasia)  Renal cyst  Family history of colon cancer  Colon cancer screening  Lesion of colon  History of colon polyps  RLQ abdominal pain  Adenomatous polyp of ascending colon  Heartburn    Heart aneurysm x 3  Enlarged prostate  RLS                  Anesthesia Plan    ASA 3     MAC     intravenous induction   Anesthetic plan, all risks, benefits, and alternatives have been provided, discussed and informed consent has been obtained with: patient.    Plan discussed with attending.

## 2019-10-31 LAB
LAB AP CASE REPORT: NORMAL
LAB AP CASE REPORT: NORMAL
PATH REPORT.FINAL DX SPEC: NORMAL
PATH REPORT.FINAL DX SPEC: NORMAL

## 2019-11-04 ENCOUNTER — TELEPHONE (OUTPATIENT)
Dept: GASTROENTEROLOGY | Facility: CLINIC | Age: 64
End: 2019-11-04

## 2019-11-04 NOTE — TELEPHONE ENCOUNTER
Bx results from colonoscopy briefly discussed with patient.  EGD Bx reveal no dysplasia or malignancy.

## 2019-11-04 NOTE — TELEPHONE ENCOUNTER
I don't see anything different than what you told him. He needs to keep his follow up appointment with Dr. Manriquez to discuss further.

## 2019-11-04 NOTE — TELEPHONE ENCOUNTER
Called patient.  Dr. Manriquez states that it is best to postpone the cardiac cath for now, since patient will need another resection of colonic lesion. [facility]  Patient states he will call them and speak with them.

## 2019-11-12 ENCOUNTER — OFFICE VISIT (OUTPATIENT)
Dept: PULMONOLOGY | Facility: CLINIC | Age: 64
End: 2019-11-12

## 2019-11-12 VITALS
SYSTOLIC BLOOD PRESSURE: 120 MMHG | WEIGHT: 207 LBS | BODY MASS INDEX: 29.63 KG/M2 | OXYGEN SATURATION: 98 % | DIASTOLIC BLOOD PRESSURE: 60 MMHG | HEART RATE: 74 BPM | RESPIRATION RATE: 18 BRPM | HEIGHT: 70 IN

## 2019-11-12 DIAGNOSIS — G25.81 RESTLESS LEG SYNDROME: Primary | ICD-10-CM

## 2019-11-12 PROCEDURE — 99213 OFFICE O/P EST LOW 20 MIN: CPT | Performed by: INTERNAL MEDICINE

## 2019-11-12 NOTE — PROGRESS NOTES
"Chief Complaint   Patient presents with   • Follow-up   • Sleeping Problem         Subjective   Deny Cee is a 64 y.o. male.     History of Present Illness   Seems to be sleeping better with a \"heavy blanket\". Feels that his legs don't move as much anymore.     His sleep is a lot better now.     The following portions of the patient's history were reviewed and updated as appropriate: allergies, current medications, past family history, past medical history, past social history and past surgical history.    Review of Systems   Constitutional: Negative for chills and fever.   HENT: Positive for rhinorrhea and sinus pressure. Negative for sore throat.    Respiratory: Positive for chest tightness. Negative for cough, shortness of breath and wheezing.    Psychiatric/Behavioral: Positive for sleep disturbance.       Objective   Visit Vitals  /60   Pulse 74   Resp 18   Ht 177.8 cm (70\")   Wt 93.9 kg (207 lb)   SpO2 98%   BMI 29.70 kg/m²       Physical Exam   Constitutional: He is oriented to person, place, and time. He appears well-developed and well-nourished.   Eyes: EOM are normal.   Neck: Neck supple. No JVD present.   Musculoskeletal:   Gait was normal.   Neurological: He is alert and oriented to person, place, and time.   Skin: Skin is warm and dry.   Vitals reviewed.      Assessment/Plan   Deny was seen today for follow-up and sleeping problem.    Diagnoses and all orders for this visit:    Restless leg syndrome         Return if symptoms worsen or fail to improve.    DISCUSSION (if any):  I reviewed the results of last sleep study in detail again with him. It did not show any evidence of sleep apnea.     Continue non medicinal treatment for RLS.       I spent a total of 20 minutes face to face with this patient. his pertinent current and previous data, as applicable, were reviewed. Patient's diagnostic studies were also reviewed. More than 10 minutes of the time spent, was spent in counseling " about patient's underlying disease process, reviewing any available sleep studies, need to use devices (as applicable) on a regular basis and lifestyle modifications that may positively impact patient's health.       Dictated utilizing Dragon dictation.    This document was electronically signed by Loretta Denton MD on 11/12/19 at 11:57 AM

## 2019-11-14 ENCOUNTER — OFFICE VISIT (OUTPATIENT)
Dept: GASTROENTEROLOGY | Facility: CLINIC | Age: 64
End: 2019-11-14

## 2019-11-14 VITALS
TEMPERATURE: 97.5 F | RESPIRATION RATE: 18 BRPM | WEIGHT: 209 LBS | HEIGHT: 70 IN | DIASTOLIC BLOOD PRESSURE: 63 MMHG | HEART RATE: 77 BPM | BODY MASS INDEX: 29.92 KG/M2 | SYSTOLIC BLOOD PRESSURE: 119 MMHG

## 2019-11-14 DIAGNOSIS — K63.9 LESION OF COLON: ICD-10-CM

## 2019-11-14 DIAGNOSIS — K57.30 DIVERTICULOSIS OF COLON WITHOUT HEMORRHAGE: ICD-10-CM

## 2019-11-14 DIAGNOSIS — R12 HEARTBURN: Primary | ICD-10-CM

## 2019-11-14 DIAGNOSIS — K22.2 SCHATZKI'S RING: ICD-10-CM

## 2019-11-14 DIAGNOSIS — K55.20 ANGIODYSPLASIA OF COLON: ICD-10-CM

## 2019-11-14 DIAGNOSIS — D12.2 ADENOMATOUS POLYP OF ASCENDING COLON: ICD-10-CM

## 2019-11-14 PROCEDURE — 99214 OFFICE O/P EST MOD 30 MIN: CPT | Performed by: INTERNAL MEDICINE

## 2019-11-14 NOTE — DISCHARGE INSTRUCTIONS
REST TODAY.    No pushing, pulling, tugging,  heavy lifting, or strenuous activity.  No major decision making, driving, or drinking alcoholic beverages for 24 hours. ( due to the medications you have  received)  Always use good hand hygiene/washing techniques.  NO driving while taking pain medications.    * if you have an incision:  Check your incision area every day for signs of infection.   Check for:  * more redness, swelling, or pain  *more fluid or blood  *warmth  *pus or bad smell  No pushing, pulling, tugging,  heavy lifting, or strenuous activity.  No major decision making, driving, or drinking alcoholic beverages for 24 hours. ( due to the medications you have  received)  Always use good hand hygiene/washing techniques.  NO driving while taking pain medications.    * if you have an incision:  Check your incision area every day for signs of infection.   Check for:  * more redness, swelling, or pain  *more fluid or blood  *warmth  *pus or bad smell    To assist you in voiding:  Drink plenty of fluids  Listen to running water while attempting to void.    If you are unable to urinate and you have an uncomfortable urge to void or it has been   6 hours since you were discharged, return to the Emergency Room      NK F EGD DIL*********************************************************************    Postprocedure instructions:  1. Nothing by mouth until fully alert and as specified below .  2. Bedrest until fully alert.  3. Vital signs as routine.    Diet:   1. Nothing by mouth for 60 minutes, then if no chest pains the patient may have Clear liquids diet (No Sodas) for 4 hours.  2. May advance to soft diet in 4 hours if no chest pains, Fever or chills, nausea vomiting or bleeding.    Other instructions:  1. The patient may eat in upright position, chew well, take small bites and take medications in upright position.   2. The patient should drink water after 3-4 bites, and liberally with medications.    3. The  patient should remain upright for about 10 minutes after eating and taking oral medications.    Blood Thinner and other medications Directions:  Avoid Aspirin & other NSAIDS for 3 days.  Tylenol is okay.    Other instructions:  The patient should avoid medications that have a potential to cause pill esophagitis including potassium pills, tetracycline capsules, iron pills, NSAIDs and bisphosphonates.    Follow-up:    DR. JULIANO MANRIQUEZ: Keep the appointment.   ****************************************************************************************************    Notes to the patient and the family from Dr. Manriquez.     Dear patient/family member,    Findings on today's procedure are as follows:    1. Esophagitis. Inflammation of the esophagus.  2. Esophageal rings. Schatzki's ring. These areas were stretched.   3. Mid esophageal polyp.  This was removed.  4. Inflammation of the stomach.  Erosive gastritis.  5. Small sliding hiatal hernia.  6. No cancer. No active ulcers.   7. Erythematous duodenitis.  Inflammation of the small intestine.    Recommendations:    1. Protonix (Pantoprazole) tablet 40 mg tablet. Take 1 tablet orally in the morning half an hour before eating every day.  2. Other instructions as above.  3. Discontinue Pepcid-famotidine.    Should you have more questions please do not hesitate to talk to the nurse who can call me and let me talk to you.     I hope you feel better.    Juliano Manriquez M.D., FACP, FACG.

## 2019-11-14 NOTE — PATIENT INSTRUCTIONS
1. Antireflux measures.  2. Low-fat-high-fiber diet with liberal water intake.  Discussed with the patient.  3. The patient should avoid drinking soda beverages and limit the coffee intake to 1-2 cups a day.  4. Protonix (Pantoprazole) tablet 40 mg tablet. Take 1 tablet orally in the morning half an hour before eating every day.  5. The patient may call back regarding scheduling colonoscopy for resection of the residual polyps-lesions.

## 2019-11-21 RX ORDER — CLOPIDOGREL BISULFATE 75 MG/1
75 TABLET ORAL DAILY
COMMUNITY
End: 2020-01-16 | Stop reason: HOSPADM

## 2019-12-19 ENCOUNTER — PREP FOR SURGERY (OUTPATIENT)
Dept: OTHER | Facility: HOSPITAL | Age: 64
End: 2019-12-19

## 2019-12-19 DIAGNOSIS — D12.2 ADENOMATOUS POLYP OF ASCENDING COLON: ICD-10-CM

## 2019-12-19 DIAGNOSIS — K63.9 LESION OF COLON: Primary | ICD-10-CM

## 2019-12-19 RX ORDER — SODIUM CHLORIDE 9 MG/ML
70 INJECTION, SOLUTION INTRAVENOUS CONTINUOUS PRN
Status: CANCELLED | OUTPATIENT
Start: 2019-12-19

## 2020-01-16 ENCOUNTER — ANESTHESIA EVENT (OUTPATIENT)
Dept: GASTROENTEROLOGY | Facility: HOSPITAL | Age: 65
End: 2020-01-16

## 2020-01-16 ENCOUNTER — ANESTHESIA (OUTPATIENT)
Dept: GASTROENTEROLOGY | Facility: HOSPITAL | Age: 65
End: 2020-01-16

## 2020-01-16 ENCOUNTER — HOSPITAL ENCOUNTER (OUTPATIENT)
Facility: HOSPITAL | Age: 65
Setting detail: HOSPITAL OUTPATIENT SURGERY
Discharge: HOME | End: 2020-01-16
Attending: INTERNAL MEDICINE | Admitting: INTERNAL MEDICINE

## 2020-01-16 VITALS
DIASTOLIC BLOOD PRESSURE: 51 MMHG | WEIGHT: 204 LBS | TEMPERATURE: 97.7 F | OXYGEN SATURATION: 97 % | RESPIRATION RATE: 18 BRPM | SYSTOLIC BLOOD PRESSURE: 118 MMHG | HEIGHT: 71 IN | HEART RATE: 53 BPM | BODY MASS INDEX: 28.56 KG/M2

## 2020-01-16 DIAGNOSIS — K63.9 LESION OF COLON: ICD-10-CM

## 2020-01-16 DIAGNOSIS — D12.2 ADENOMATOUS POLYP OF ASCENDING COLON: ICD-10-CM

## 2020-01-16 PROCEDURE — 25010000002 ONDANSETRON PER 1 MG: Performed by: NURSE ANESTHETIST, CERTIFIED REGISTERED

## 2020-01-16 PROCEDURE — S0260 H&P FOR SURGERY: HCPCS | Performed by: INTERNAL MEDICINE

## 2020-01-16 PROCEDURE — 45385 COLONOSCOPY W/LESION REMOVAL: CPT | Performed by: INTERNAL MEDICINE

## 2020-01-16 PROCEDURE — 25010000002 PROPOFOL 200 MG/20ML EMULSION: Performed by: NURSE ANESTHETIST, CERTIFIED REGISTERED

## 2020-01-16 PROCEDURE — 45384 COLONOSCOPY W/LESION REMOVAL: CPT | Performed by: INTERNAL MEDICINE

## 2020-01-16 PROCEDURE — 45388 COLONOSCOPY W/ABLATION: CPT | Performed by: INTERNAL MEDICINE

## 2020-01-16 PROCEDURE — 88305 TISSUE EXAM BY PATHOLOGIST: CPT | Performed by: INTERNAL MEDICINE

## 2020-01-16 PROCEDURE — 45380 COLONOSCOPY AND BIOPSY: CPT | Performed by: INTERNAL MEDICINE

## 2020-01-16 RX ORDER — LIDOCAINE HYDROCHLORIDE 20 MG/ML
INJECTION, SOLUTION INTRAVENOUS AS NEEDED
Status: DISCONTINUED | OUTPATIENT
Start: 2020-01-16 | End: 2020-01-16 | Stop reason: SURG

## 2020-01-16 RX ORDER — PROPOFOL 10 MG/ML
INJECTION, EMULSION INTRAVENOUS AS NEEDED
Status: DISCONTINUED | OUTPATIENT
Start: 2020-01-16 | End: 2020-01-16 | Stop reason: SURG

## 2020-01-16 RX ORDER — SODIUM CHLORIDE 9 MG/ML
70 INJECTION, SOLUTION INTRAVENOUS CONTINUOUS PRN
Status: DISCONTINUED | OUTPATIENT
Start: 2020-01-16 | End: 2020-01-16 | Stop reason: HOSPADM

## 2020-01-16 RX ORDER — ONDANSETRON 2 MG/ML
INJECTION INTRAMUSCULAR; INTRAVENOUS AS NEEDED
Status: DISCONTINUED | OUTPATIENT
Start: 2020-01-16 | End: 2020-01-16 | Stop reason: SURG

## 2020-01-16 RX ORDER — KETAMINE HCL IN NACL, ISO-OSM 100MG/10ML
SYRINGE (ML) INJECTION AS NEEDED
Status: DISCONTINUED | OUTPATIENT
Start: 2020-01-16 | End: 2020-01-16 | Stop reason: SURG

## 2020-01-16 RX ORDER — SODIUM CHLORIDE 0.9 % (FLUSH) 0.9 %
10 SYRINGE (ML) INJECTION AS NEEDED
Status: DISCONTINUED | OUTPATIENT
Start: 2020-01-16 | End: 2020-01-16 | Stop reason: HOSPADM

## 2020-01-16 RX ADMIN — PROPOFOL 50 MG: 10 INJECTION, EMULSION INTRAVENOUS at 13:01

## 2020-01-16 RX ADMIN — SODIUM CHLORIDE 70 ML/HR: 9 INJECTION, SOLUTION INTRAVENOUS at 09:58

## 2020-01-16 RX ADMIN — PROPOFOL 100 MG: 10 INJECTION, EMULSION INTRAVENOUS at 12:45

## 2020-01-16 RX ADMIN — PROPOFOL 50 MG: 10 INJECTION, EMULSION INTRAVENOUS at 13:19

## 2020-01-16 RX ADMIN — LIDOCAINE HYDROCHLORIDE 50 MG: 20 INJECTION, SOLUTION INTRAVENOUS at 12:33

## 2020-01-16 RX ADMIN — PROPOFOL 100 MG: 10 INJECTION, EMULSION INTRAVENOUS at 12:33

## 2020-01-16 RX ADMIN — ONDANSETRON 4 MG: 2 INJECTION INTRAMUSCULAR; INTRAVENOUS at 13:35

## 2020-01-16 RX ADMIN — PROPOFOL 50 MG: 10 INJECTION, EMULSION INTRAVENOUS at 12:53

## 2020-01-16 RX ADMIN — Medication 15 MG: at 12:33

## 2020-01-16 RX ADMIN — PROPOFOL 50 MG: 10 INJECTION, EMULSION INTRAVENOUS at 13:09

## 2020-01-16 NOTE — ANESTHESIA PREPROCEDURE EVALUATION
Anesthesia Evaluation     Patient summary reviewed and Nursing notes reviewed   no history of anesthetic complications:  NPO Solid Status: > 8 hours  NPO Liquid Status: > 8 hours           Airway   Mallampati: I  TM distance: >3 FB  Neck ROM: full  No difficulty expected  Dental - normal exam     Pulmonary    (+) a smoker Former, shortness of breath, decreased breath sounds,   Sleep apnea:  ? sheila, snores.  Cardiovascular - normal exam  Exercise tolerance: good (4-7 METS)    PT is on anticoagulation therapy  Patient on routine beta blocker and Beta blocker given within 24 hours of surgery    (+) hypertension, valvular problems/murmurs, CAD, angina at rest, CHF , PVD, hyperlipidemia,       Neuro/Psych  (+) CVA, dizziness/light headedness, psychiatric history,       ROS Comment: rls  GI/Hepatic/Renal/Endo    (+) obesity,  GERD,  renal disease stones,     Musculoskeletal     (+) arthralgias, back pain, chronic pain,   Abdominal  - normal exam    Bowel sounds: normal.   Substance History - negative use     OB/GYN negative ob/gyn ROS         Other   arthritis,    history of cancer    ROS/Med Hx Other: ROS/Med Hx Other: BPH (benign prostatic hyperplasia)  Renal cyst  Family history of colon cancer  Colon cancer screening  Lesion of colon  History of colon polyps  RLQ abdominal pain  Adenomatous polyp of ascending colon  Heartburn    Heart aneurysm x 3  Enlarged prostate  RLS                    Anesthesia Plan    ASA 4     MAC   (Risks and benefits discussed including risk of aspiration, recall and dental damage. All patient questions answered.    Will continue with plan of care.)  intravenous induction     Anesthetic plan, all risks, benefits, and alternatives have been provided, discussed and informed consent has been obtained with: patient.    Plan discussed with attending and CRNA.
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
## 2020-01-16 NOTE — ANESTHESIA POSTPROCEDURE EVALUATION
Patient: Deny Cee    Procedure Summary     Date:  01/16/20 Room / Location:  [facility] ENDOSCOPY 2 / [facility] ENDOSCOPY    Anesthesia Start:  1224 Anesthesia Stop:  1335    Procedure:  COLONOSCOPY (N/A Anus) Diagnosis:       Lesion of colon      Colon polyps      Diverticulosis      Internal hemorrhoid      (Lesion of colon [K63.9])      (Adenomatous polyp of ascending colon [D12.2])    Surgeon:  Juliano Manriquez MD Provider:  Jojo Long CRNA    Anesthesia Type:  MAC ASA Status:  4          Anesthesia Type: MAC    Vitals  Vitals Value Taken Time   /51 1/16/2020  2:10 PM   Temp 97.7 °F (36.5 °C) 1/16/2020  1:38 PM   Pulse 53 1/16/2020  2:10 PM   Resp 18 1/16/2020  2:10 PM   SpO2 97 % 1/16/2020  2:10 PM           Post Anesthesia Care and Evaluation    Patient location during evaluation: bedside  Patient participation: complete - patient participated  Level of consciousness: awake and sleepy but conscious  Pain management: adequate  Airway patency: patent  Anesthetic complications: No anesthetic complications  PONV Status: none  Cardiovascular status: acceptable and hemodynamically stable  Respiratory status: acceptable, room air, nonlabored ventilation and spontaneous ventilation  Hydration status: acceptable

## 2020-01-16 NOTE — DISCHARGE INSTRUCTIONS
No pushing, pulling, tugging,  heavy lifting, or strenuous activity.  No major decision making, driving, or drinking alcoholic beverages for 24 hours. ( due to the medications you have  received)  Always use good hand hygiene/washing techniques.  NO driving while taking pain medications.    To assist you in voiding:  Drink plenty of fluids  Listen to running water while attempting to void.    If you are unable to urinate and you have an uncomfortable urge to void or it has been   6 hours since you were discharged, return to the Emergency Room      ************************************************************************************    Postprocedure instructions:    1. Nothing by mouth to fully alert.  2. Once fully alert may have clear liquid diet.  3. Advance diet as tolerated.  4. Vital signs as routine.    Diet:     1. Low-fat diet.    Blood Thinner Directions:    Avoid Plavix (clopidogrel) for 7 days.  If no bleeding may resume Plavix (clopidogrel) on: Wednesday, January 22, 2020.    Avoid Eliquis (Apixaban) for 7 days.  If no bleeding may resume Eliquis (Apixaban) on: Wednesday, January 22, 2020.    Avoid Aspirin & other NSAIDS for _7__ days. Tylenol is okay.    Treatments:      Other Instructions:    Call [facility] at [phone] or come to the Emergency Department if you experience the following: Chest pain, abdominal pain, bleeding (vomiting of blood or coffee colored material, black stools or [place] blood in stools), fever/chills, nausea and vomiting or dizziness.      Follow-up:  DR. DENAE MANRIQUEZ in 4 weeks.Office phone # [phone].  The patient may call back in 1 week regarding biopsy results.    Follow-up colonoscopy: Depending on the biopsy results.  ************************************************************************************    Notes to the patient and the family from Dr. Manriquez.    Dear patient/family member,    Today your colon was examined extensively from rectum to cecum and  beyond into the small intestine twice.   Findings on today's procedure are as follows:    1. Diverticulosis. These are benign outpouchings in the colon.   2. Colon polyps.  Removed from the appendicular area.  3. Previously resected site appeared to be stable.  4. 4 additional small polyps were removed.  5. The flat area behind the valve  in the cecum was thermally ablated.  6. No cancer. No inflammation or colitis. No suggestion of Crohn's disease.  7. Internal hemorrhoids.    Recommendations:    1. As above.  2. If hemorrhoidal issues: Use CORTIZONE 10 OINTMENT (hydrocortisone 1% ointment) over the counter. AVOID CREAM OR GEL.  Apply anorectally  2-3 times a day for 1 week.  May need to use a liner to protect the garments.        Should you have more questions please do not hesitate to talk to the nurse who can call me and let me talk to you.      I hope you feel better.    Juliano Manriquez M.D., FACP, FACG.

## 2020-01-16 NOTE — OP NOTE
PROCEDURE:  Colonoscopy to the terminal ileum with 4 hot biopsy and 2 cold snare polypectomies as well as biopsies and thermal ablation therapy of flat polypoid lesion in the cecum.    DATE OF PROCEDURE:  January 16, 2020    REFERRING PROVIDER:  Jose Delgado MD     INSTRUMENT USED:   Olympus PCF H 190 videocolonoscope.      INDICATIONS OF THE PROCEDURE: This is a 64-year-old white male with history of large flat colonic lesions that had been resected.  The patient has appendicular orifice polyps.    PREVIOUS COLONOSCOPY: October 2019.    BIOPSIES: Ascending colon: 2 hot biopsy polypectomies.  Cecum: Appendicular orifice 2 hot biopsy polypectomies.  2 cold snare polypectomies.  In the cecum behind the fold-ileocecal valve flat polypoid lesion was biopsied.  Ascending colon: Biopsies were obtained from the area of previous resection marked by Stacey ink.      PHOTOGRAPHS:  Photographs were included in the medical records.     MEDICATIONS:  MAC.       CONSENT/PREPROCEDURE EVALUATION:  Risks, benefits, alternatives and options of the procedure including risks of sedation/anesthesia were discussed with the patient and informed consent was obtained prior to the procedure.  History and physical examination were performed and nothing precluded the test.      REPORT:  The patient was placed in left lateral decubitus position and a digital examination was performed.  Once under the influence of IV sedation, the instrument was inserted into the rectum and advanced under direct vision to cecum which was identified by the ileocecal valve, triradiate folds and appendiceal orifice. The scope was then maneuvered into the terminal ileum.        FINDINGS:      Digital rectal examination:  Good anal tone.  No perianal pathology.  No mass.        Terminal ileum:  7-8 cm.  Normal.     Cecum and ascending colon: 2, 3-4 millimeters sessile polyps in the ascending colon were removed with hot biopsy forceps.  In the cecum at the  appendicular orifice polypoid areas were noted.  2 were removed with hot biopsy forceps and 2 were cold snare.   Flat polypoid areas were noted behind ileocecal valve.  This area is technically very difficult to reach and maintain the position.  Multiple attempts were made to access this area on a retroflex view however these were unsuccessful.  Furthermore the area goes into spasm off and on.  Multiple biopsies were obtained in this area.  This area was treated with thermal ablation therapy using argon plasma coagulator (Right colon APC mode/ERBE/Side firing probe).     Hepatic flexure, transverse colon, splenic flexure:  Scant diverticulosis.     Descending colon, sigmoid colon and rectum: Diverticulosis.  A retroflex examination within the rectum revealed internal hemorrhoids.        The scope was then straightened, the lower GI tract was decompressed, and the scope was pulled out of the patient.  The patient tolerated the procedure well.  There were no immediate complications and the patient was transferred in stable condition for post procedure observation.      TECHNICAL DATA:   1. [place] prep score: 8 (3+2+3).    2. Anus to cecal time: 3   minutes.  3. Difficulty of examination:  Average.    4. Withdrawal time: 8 minutes.  5. Procedure time:52  minutes  6. Retroflex examination in right colon: Yes.    7. Second look Rectum to cecum with decompression: Yes.    DIAGNOSES:    1. Diverticulosis.  2. Colon polyps.  7 were removed.  3-4 mm in size.  3. Flat polypoid lesion behind the ileocecal valve status post biopsies.  This area was treated with thermal ablation therapy using argon plasma coagulator.  4. Internal hemorrhoids.  5. Stable previously resected area marked by Stacey ink.    RECOMMENDATIONS:     1. Dietary instructions.  2. Follow biopsies.    3. Follow-up:   The patient may call back in 1 week regarding biopsy results.  Otherwise follow-up in 1 to 2 weeks.  4. Followup colonoscopy: Depending on the  biopsy results.     Thank you very much for letting me participate in the care of this patient. Please do not hesitate to call me if you have any questions.

## 2020-01-21 LAB
LAB AP CASE REPORT: NORMAL
PATH REPORT.FINAL DX SPEC: NORMAL

## 2020-03-03 ENCOUNTER — OFFICE VISIT (OUTPATIENT)
Dept: GASTROENTEROLOGY | Facility: CLINIC | Age: 65
End: 2020-03-03

## 2020-03-03 VITALS
TEMPERATURE: 97.8 F | WEIGHT: 212 LBS | HEIGHT: 71 IN | HEART RATE: 82 BPM | BODY MASS INDEX: 29.68 KG/M2 | SYSTOLIC BLOOD PRESSURE: 122 MMHG | RESPIRATION RATE: 16 BRPM | DIASTOLIC BLOOD PRESSURE: 78 MMHG

## 2020-03-03 DIAGNOSIS — D12.6 ADENOMATOUS POLYP OF COLON, UNSPECIFIED PART OF COLON: Primary | ICD-10-CM

## 2020-03-03 DIAGNOSIS — R12 HEARTBURN: ICD-10-CM

## 2020-03-03 DIAGNOSIS — K63.9 LESION OF COLON: ICD-10-CM

## 2020-03-03 PROCEDURE — 99214 OFFICE O/P EST MOD 30 MIN: CPT | Performed by: INTERNAL MEDICINE

## 2020-03-03 RX ORDER — BACLOFEN 10 MG/1
10 TABLET ORAL DAILY PRN
COMMUNITY

## 2020-03-03 RX ORDER — SOD CHLORD/LANOLIN/MIN.OIL/PET
1 LOTION (ML) TOPICAL AS NEEDED
COMMUNITY

## 2020-03-03 RX ORDER — CLOPIDOGREL BISULFATE 75 MG/1
75 TABLET ORAL DAILY
Status: ON HOLD | COMMUNITY
End: 2021-03-09 | Stop reason: SDUPTHER
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
## 2020-03-03 NOTE — PATIENT INSTRUCTIONS
1. Antireflux measures.  2. Protonix (Pantoprazole) tablet 40 mg tablet. Take 1 tablet orally in the morning half an hour before eating every day.  3. Colonoscopy is recommended in 6 months to a year's time to reevaluate the flat polypoid lesion that had been treated with thermal ablation therapy and January 2020.  The patient will call back regarding scheduling.

## 2020-09-14 ENCOUNTER — OFFICE VISIT (OUTPATIENT)
Dept: GASTROENTEROLOGY | Facility: CLINIC | Age: 65
End: 2020-09-14

## 2020-09-14 VITALS
WEIGHT: 204 LBS | SYSTOLIC BLOOD PRESSURE: 114 MMHG | HEIGHT: 71 IN | BODY MASS INDEX: 28.56 KG/M2 | TEMPERATURE: 98.2 F | HEART RATE: 69 BPM | DIASTOLIC BLOOD PRESSURE: 67 MMHG

## 2020-09-14 DIAGNOSIS — Z01.812 PRE-PROCEDURE LAB EXAM: Primary | ICD-10-CM

## 2020-09-14 DIAGNOSIS — Z80.0 FAMILY HX OF COLON CANCER: Primary | ICD-10-CM

## 2020-09-14 DIAGNOSIS — K21.9 GASTROESOPHAGEAL REFLUX DISEASE WITHOUT ESOPHAGITIS: ICD-10-CM

## 2020-09-14 DIAGNOSIS — D12.6 COLON ADENOMA: ICD-10-CM

## 2020-09-14 PROCEDURE — 99213 OFFICE O/P EST LOW 20 MIN: CPT | Performed by: INTERNAL MEDICINE

## 2020-09-14 RX ORDER — BISACODYL 5 MG/1
20 TABLET, DELAYED RELEASE ORAL ONCE
Qty: 4 TABLET | Refills: 0 | Status: SHIPPED | OUTPATIENT
Start: 2020-09-14 | End: 2020-09-23

## 2020-09-14 RX ORDER — SODIUM CHLORIDE 9 MG/ML
70 INJECTION, SOLUTION INTRAVENOUS CONTINUOUS PRN
Status: CANCELLED | OUTPATIENT
Start: 2020-09-14
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
## 2020-09-15 PROBLEM — D12.6 COLON ADENOMA: Status: ACTIVE | Noted: 2020-09-15

## 2020-10-05 ENCOUNTER — LAB (OUTPATIENT)
Dept: LAB | Facility: HOSPITAL | Age: 65
End: 2020-10-05

## 2020-10-05 DIAGNOSIS — Z01.812 PRE-PROCEDURE LAB EXAM: ICD-10-CM

## 2020-10-05 PROCEDURE — U0004 COV-19 TEST NON-CDC HGH THRU: HCPCS

## 2020-10-05 PROCEDURE — C9803 HOPD COVID-19 SPEC COLLECT: HCPCS

## 2020-10-06 LAB — SARS-COV-2 RNA RESP QL NAA+PROBE: NOT DETECTED

## 2020-10-06 NOTE — PAT
"Health history obtained with patient via phone to prepare for upcoming colonoscopy scheduled for 10-07-20 with Dr Heaton.  During interview patient reported prior history of CHF, 3 \"leaky valves\" and that he had an aneurysm (unsure size when asked).  Patient reported history of chest pain but reported no history of MI.  Reported chest pain has improved with medication regimen.  Takes both plavix and eliquis for anticoagulants and has followed pre-op instructions and held meds per MD instructions.  Patient reported care is at [facility] and reported last visit around May or June 2020.  RN faxed for records with receipt of fax received.      After discussion, RN phoned anesthesia service for consultation.  RN spoke with Michael Trujillo CRNA regarding the above noted information.  After discussion, CRNA verbalized that patient may proceed with procedure as scheduled.   "

## 2020-10-07 ENCOUNTER — HOSPITAL ENCOUNTER (OUTPATIENT)
Facility: HOSPITAL | Age: 65
Setting detail: HOSPITAL OUTPATIENT SURGERY
Discharge: HOME | End: 2020-10-07
Attending: INTERNAL MEDICINE | Admitting: INTERNAL MEDICINE

## 2020-10-07 ENCOUNTER — ANESTHESIA EVENT (OUTPATIENT)
Dept: GASTROENTEROLOGY | Facility: HOSPITAL | Age: 65
End: 2020-10-07

## 2020-10-07 ENCOUNTER — ANESTHESIA (OUTPATIENT)
Dept: GASTROENTEROLOGY | Facility: HOSPITAL | Age: 65
End: 2020-10-07

## 2020-10-07 VITALS
HEIGHT: 71 IN | BODY MASS INDEX: 28.56 KG/M2 | TEMPERATURE: 98.4 F | WEIGHT: 204 LBS | OXYGEN SATURATION: 96 % | HEART RATE: 54 BPM | RESPIRATION RATE: 16 BRPM | DIASTOLIC BLOOD PRESSURE: 58 MMHG | SYSTOLIC BLOOD PRESSURE: 117 MMHG

## 2020-10-07 DIAGNOSIS — D12.6 COLON ADENOMA: ICD-10-CM

## 2020-10-07 PROCEDURE — 25010000002 PROPOFOL 10 MG/ML EMULSION: Performed by: NURSE ANESTHETIST, CERTIFIED REGISTERED

## 2020-10-07 PROCEDURE — 88305 TISSUE EXAM BY PATHOLOGIST: CPT | Performed by: INTERNAL MEDICINE

## 2020-10-07 PROCEDURE — 45380 COLONOSCOPY AND BIOPSY: CPT | Performed by: INTERNAL MEDICINE

## 2020-10-07 RX ORDER — FLUTICASONE PROPIONATE 50 MCG
2 SPRAY, SUSPENSION (ML) NASAL DAILY
COMMUNITY

## 2020-10-07 RX ORDER — LIDOCAINE HYDROCHLORIDE 20 MG/ML
INJECTION, SOLUTION INTRAVENOUS AS NEEDED
Status: DISCONTINUED | OUTPATIENT
Start: 2020-10-07 | End: 2020-10-07 | Stop reason: SURG

## 2020-10-07 RX ORDER — MAGNESIUM HYDROXIDE 1200 MG/15ML
LIQUID ORAL AS NEEDED
Status: DISCONTINUED | OUTPATIENT
Start: 2020-10-07 | End: 2020-10-07 | Stop reason: HOSPADM

## 2020-10-07 RX ORDER — PROPOFOL 10 MG/ML
VIAL (ML) INTRAVENOUS AS NEEDED
Status: DISCONTINUED | OUTPATIENT
Start: 2020-10-07 | End: 2020-10-07 | Stop reason: SURG

## 2020-10-07 RX ORDER — SODIUM CHLORIDE 9 MG/ML
70 INJECTION, SOLUTION INTRAVENOUS CONTINUOUS PRN
Status: DISCONTINUED | OUTPATIENT
Start: 2020-10-07 | End: 2020-10-07 | Stop reason: HOSPADM

## 2020-10-07 RX ADMIN — LIDOCAINE HYDROCHLORIDE 40 MG: 20 INJECTION, SOLUTION INTRAVENOUS at 11:48

## 2020-10-07 RX ADMIN — PROPOFOL 120 MCG/KG/MIN: 10 INJECTION, EMULSION INTRAVENOUS at 11:48

## 2020-10-07 RX ADMIN — SODIUM CHLORIDE 70 ML/HR: 9 INJECTION, SOLUTION INTRAVENOUS at 10:00

## 2020-10-07 RX ADMIN — PROPOFOL 100 MG: 10 INJECTION, EMULSION INTRAVENOUS at 11:48

## 2020-10-07 NOTE — ANESTHESIA POSTPROCEDURE EVALUATION
Patient: Deny Cee    Procedure Summary     Date: 10/07/20 Room / Location: [facility] ENDOSCOPY 1 / [facility] ENDOSCOPY    Anesthesia Start: 1142 Anesthesia Stop:     Procedure: COLONOSCOPY WITH BIOPSY POLYPECTOMY (N/A Anus) Diagnosis:       Colon adenoma      (Colon adenoma [D12.6])    Surgeon: Amelia Heaton MD Provider: Gentry Moore CRNA    Anesthesia Type: MAC ASA Status: 3          Anesthesia Type: MAC    Vitals  HR 53  Sat 98  Resp 14  BP 94/53  Temp 98        Post Anesthesia Care and Evaluation    Patient location during evaluation: bedside  Patient participation: complete - patient participated  Level of consciousness: awake and alert and sleepy but conscious  Pain score: 0  Pain management: adequate  Airway patency: patent  Anesthetic complications: No anesthetic complications  PONV Status: none  Cardiovascular status: acceptable  Respiratory status: acceptable  Hydration status: acceptable

## 2020-10-07 NOTE — DISCHARGE INSTRUCTIONS
Please follow all post op instructions and follow up appointment time from your physician's office included in your discharge packet.    Rest today    No pushing,pulling,tugging,heavy lifting, or strenuous activity   No major decision making,driving,or drinking alcoholic beverages for 24 hours due to the sedation you received  Always use good hand hygiene/washing technique  No driving on pain medication.    To assist you in voiding:  Drink plenty of fluids  Listen to running water while attempting to void.    If you are unable to urinate and you have an uncomfortable urge to void or it has been   6 hours since you were discharged, return to the Emergency Room.      - Discharge patient to home (ambulatory).   - Resume previous diet.   - Continue present medications.   - Await pathology results.   - Repeat colonoscopy in 3 months for retreatment  - Needs EMR by advanced endoscopy or local resection.   - Return to GI office in 4 weeks.

## 2020-10-07 NOTE — ANESTHESIA PREPROCEDURE EVALUATION
Anesthesia Evaluation     Patient summary reviewed and Nursing notes reviewed   NPO Solid Status: > 8 hours  NPO Liquid Status: > 8 hours           Airway   Mallampati: I  TM distance: >3 FB  Neck ROM: full  no difficulty expected  Dental - normal exam   (+) partials    Pulmonary - normal exam   (+) a smoker Former,   Cardiovascular - normal exam    PT is on anticoagulation therapy  Patient on routine beta blocker  Rhythm: regular  Rate: normal    (+) hypertension, valvular problems/murmurs murmur and MR, CAD, hyperlipidemia,     ROS comment: Echo EF 55-60    Neuro/Psych  (+) CVA, dizziness/light headedness, psychiatric history Anxiety,     GI/Hepatic/Renal/Endo    (+)  GERD,  renal disease stones,     Musculoskeletal     (+) back pain,   Abdominal  - normal exam    Abdomen: soft.  Bowel sounds: normal.   Substance History      OB/GYN          Other   arthritis,                      Anesthesia Plan    ASA 3     MAC   (Risks and benefits discussed including risk of aspiration, recall and dental damage. All patient questions answered. Will continue with POC.)  intravenous induction     Anesthetic plan, all risks, benefits, and alternatives have been provided, discussed and informed consent has been obtained with: patient.

## 2020-10-07 NOTE — NURSING NOTE
Upon arrival, Dr. Heaton notified that ptietn took Eliquis 3 days ago per orders from the office.  OK to proceed.

## 2020-10-09 LAB
LAB AP CASE REPORT: NORMAL
PATH REPORT.FINAL DX SPEC: NORMAL

## 2020-10-10 DIAGNOSIS — D12.6 ADENOMATOUS POLYP OF COLON, UNSPECIFIED PART OF COLON: Primary | ICD-10-CM

## 2020-10-12 ENCOUNTER — TELEPHONE (OUTPATIENT)
Dept: GASTROENTEROLOGY | Facility: CLINIC | Age: 65
End: 2020-10-12

## 2020-10-12 DIAGNOSIS — K63.9 CECAL LESION: Primary | ICD-10-CM

## 2020-10-12 NOTE — TELEPHONE ENCOUNTER
----- Message from Amelia Heaton MD sent at 10/10/2020  1:55 PM EDT -----    He needs a referral to [facility] gastroenterology for EMR of the cecal lesion    Please send recent colonoscopy and biopsy report  Send colonoscopy report from 2019 and colonoscopy report from January 2020  Send images from previous colonoscopy  Send my recent H&P

## 2020-10-22 ENCOUNTER — TELEPHONE (OUTPATIENT)
Dept: GASTROENTEROLOGY | Facility: CLINIC | Age: 65
End: 2020-10-22

## 2020-10-22 DIAGNOSIS — K63.9 LESION OF COLON: Primary | ICD-10-CM

## 2020-11-02 ENCOUNTER — RETAIL PHARMACY CONSULTATION (OUTPATIENT)
Dept: PHARMACY | Facility: HOSPITAL | Age: 65
End: 2020-11-02

## 2020-11-02 ENCOUNTER — OFFICE VISIT (OUTPATIENT)
Dept: UROLOGY | Facility: CLINIC | Age: 65
End: 2020-11-02

## 2020-11-02 VITALS
BODY MASS INDEX: 28.56 KG/M2 | HEIGHT: 71 IN | TEMPERATURE: 97.9 F | SYSTOLIC BLOOD PRESSURE: 122 MMHG | DIASTOLIC BLOOD PRESSURE: 64 MMHG | WEIGHT: 204 LBS | OXYGEN SATURATION: 99 % | HEART RATE: 72 BPM

## 2020-11-02 DIAGNOSIS — N40.1 BPH WITH URINARY OBSTRUCTION: ICD-10-CM

## 2020-11-02 DIAGNOSIS — Z87.438 HISTORY OF BPH: Primary | ICD-10-CM

## 2020-11-02 DIAGNOSIS — E29.1 HYPOGONADISM MALE: ICD-10-CM

## 2020-11-02 DIAGNOSIS — R35.1 NOCTURIA MORE THAN TWICE PER NIGHT: ICD-10-CM

## 2020-11-02 DIAGNOSIS — N13.8 BPH WITH URINARY OBSTRUCTION: ICD-10-CM

## 2020-11-02 DIAGNOSIS — Z87.448 HISTORY OF HEMATURIA: ICD-10-CM

## 2020-11-02 LAB
BILIRUB BLD-MCNC: NEGATIVE MG/DL
CLARITY, POC: CLEAR
COLOR UR: YELLOW
GLUCOSE UR STRIP-MCNC: NEGATIVE MG/DL
KETONES UR QL: NEGATIVE
LEUKOCYTE EST, POC: NEGATIVE
NITRITE UR-MCNC: NEGATIVE MG/ML
PH UR: 6 [PH] (ref 5–8)
PROT UR STRIP-MCNC: NEGATIVE MG/DL
RBC # UR STRIP: ABNORMAL /UL
SP GR UR: 1.01 (ref 1–1.03)
UROBILINOGEN UR QL: NORMAL

## 2020-11-02 PROCEDURE — 81003 URINALYSIS AUTO W/O SCOPE: CPT | Performed by: UROLOGY

## 2020-11-02 PROCEDURE — 76857 US EXAM PELVIC LIMITED: CPT | Performed by: UROLOGY

## 2020-11-02 PROCEDURE — 99204 OFFICE O/P NEW MOD 45 MIN: CPT | Performed by: UROLOGY

## 2020-11-02 RX ORDER — SILODOSIN 8 MG/1
8 CAPSULE ORAL
Qty: 30 CAPSULE | Refills: 11 | Status: SHIPPED | OUTPATIENT
Start: 2020-11-02 | End: 2020-11-04
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
## 2020-11-02 NOTE — PROGRESS NOTES
[facility]- UROLOGY  [address], [address]  [place], [place] [address]  [phone]      11/02/2020    Deny Cee  1955        Pelvic Ultrasound with PVR    A transabdominal pelvic ultrasound was performed using a 3.5 MHz transducer of a B-K Lock through the suprapubic area.     The purpose of the study was to investigate difficulty voiding.  There was significant bladder wall thickening noted.  There were no intravesical filling defects seen.  The post void residual of 129 ml was noted.  Prostate was homogeneous and was noted to be 48.6 grams.  There was a protrusion of the prostate into the bladder.  Ultrasound images will be scanned into the chart for reference.       CPT code 85747        Performed by Nicola Briseno MD

## 2020-11-03 ENCOUNTER — TELEPHONE (OUTPATIENT)
Dept: UROLOGY | Facility: CLINIC | Age: 65
End: 2020-11-03

## 2020-11-03 LAB
PSA SERPL-MCNC: 1.66 NG/ML (ref 0–4)
TESTOST FREE SERPL-MCNC: 6.3 PG/ML (ref 6.6–18.1)
TESTOST SERPL-MCNC: 254 NG/DL (ref 264–916)

## 2020-11-03 NOTE — TELEPHONE ENCOUNTER
Insurance will not pay for Rapaflo, patient must try and fail the following drugs first.      Finasteride  Dutasteride  tamsulosin  Alfuzosin  Terazosin      AR/CMA

## 2020-11-04 DIAGNOSIS — N13.8 BPH WITH URINARY OBSTRUCTION: Primary | ICD-10-CM

## 2020-11-04 DIAGNOSIS — N40.1 BPH WITH URINARY OBSTRUCTION: Primary | ICD-10-CM

## 2020-11-04 RX ORDER — ALFUZOSIN HYDROCHLORIDE 10 MG/1
10 TABLET, EXTENDED RELEASE ORAL DAILY
Qty: 30 TABLET | Refills: 2 | Status: SHIPPED | OUTPATIENT
Start: 2020-11-04 | End: 2021-03-26 | Stop reason: SDUPTHER

## 2020-11-13 ENCOUNTER — OFFICE VISIT (OUTPATIENT)
Dept: UROLOGY | Facility: CLINIC | Age: 65
End: 2020-11-13

## 2020-11-13 DIAGNOSIS — R35.1 NOCTURIA MORE THAN TWICE PER NIGHT: ICD-10-CM

## 2020-11-13 DIAGNOSIS — E29.1 HYPOGONADISM MALE: ICD-10-CM

## 2020-11-13 DIAGNOSIS — N40.1 BPH WITH URINARY OBSTRUCTION: Primary | ICD-10-CM

## 2020-11-13 DIAGNOSIS — N13.8 BPH WITH URINARY OBSTRUCTION: Primary | ICD-10-CM

## 2020-11-13 PROCEDURE — 99213 OFFICE O/P EST LOW 20 MIN: CPT | Performed by: UROLOGY
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
## 2020-12-01 RX ORDER — PANTOPRAZOLE SODIUM 40 MG/1
TABLET, DELAYED RELEASE ORAL
Qty: 30 TABLET | Refills: 5 | Status: SHIPPED | OUTPATIENT
Start: 2020-12-01

## 2020-12-08 NOTE — PROGRESS NOTES
[facility]  [address], [address]  [place], [place], [address]    [phone]  WWW.[facility]           OUTPATIENT CLINIC CONSULTATION NOTE    Patient care team:  Patient Care Team:  Jose Delgado MD as PCP - General  Jose Delgado MD as PCP - Family Medicine    Requesting Provider and Reason for consultation: The patient is being seen today at the request of Leonel Cordero MD for cardiac risk stratification, CAD.     Subjective:   Chief complaint:   Chief Complaint   Patient presents with   • Chest Pain     Consult   • Shortness of Breath   • Irregular Heart Beat       HPI:    Deny Cee is a 65 y.o. male. 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 Coronary aneurysm, Mild nonobstructive CAD  a. Proximal LAD and RCA followed by the [facility]  b. Heart cath 11/5/2019 with proximal to mid LAD aneurysm, negative FFR of 0.88, Pd/Pa of 0.93, mid LAD ectasia, proximal RCA ectasia, mild diffuse disease  2. Unclear history of congestive heart failure  3. CVA  4. Hypertension  5. Bradycardia  6. Hyperlipidemia  7. Colon cancer  8. GERD  9. Sleep disturbances, negative for sleep apnea, approximately 2018   10. Restless leg syndrome  a. Noted during sleep study, approximately 2018  11. Former tobacco use, quit 2014  12. Electrocution 1970s    The patient presents to establish care    Patient is hoping to undergo a colonoscopy and later surgery for colonic neoplasia.  Presents to establish local care for his cardiac issues as well as for preprocedural restratification.    Patient overall feels well from a cardiac standpoint.  His chest pain syndrome is currently well controlled.  Still has mild chest tightness, centrally located, improving with nitro sublingual which he uses every 2 to 3 weeks.  Overall this is much improved where he has been previously.  He is on multiple antianginals.  Denies exertional  dyspnea, lower extremity swelling.  Denies palpitations.    Review of Systems:  Positive for chest pain, sleep disturbances  All other systems reviewed and are negative.    PFSH:  Patient Active Problem List   Diagnosis   • BPH (benign prostatic hyperplasia)   • Renal cyst   • Family history of colon cancer   • Colon cancer screening   • Lesion of colon   • History of colon polyps   • RLQ abdominal pain   • Adenomatous polyp of ascending colon   • Heartburn   • Colon adenoma         Current Outpatient Medications:   •  acetaminophen (TYLENOL) 500 MG tablet, Take 500 mg by mouth Every 6 (Six) Hours As Needed for Moderate Pain ., Disp: , Rfl:   •  alfuzosin (UROXATRAL) 10 MG 24 hr tablet, Take 1 tablet by mouth Daily., Disp: 30 tablet, Rfl: 2  •  amLODIPine (NORVASC) 2.5 MG tablet, Take 2.5 mg by mouth Daily., Disp: , Rfl:   •  apixaban (ELIQUIS) 5 MG tablet tablet, Take 5 mg by mouth 2 (Two) Times a Day., Disp: , Rfl:   •  baclofen (LIORESAL) 10 MG tablet, Take 10 mg by mouth As Needed for Muscle Spasms., Disp: , Rfl:   •  busPIRone (BUSPAR) 10 MG tablet, Take 10 mg by mouth Daily., Disp: , Rfl:   •  Cetirizine HCl (ZYRTEC ALLERGY) 10 MG capsule, Take 10 mg by mouth Daily As Needed (Rhinitis)., Disp: , Rfl:   •  clopidogrel (PLAVIX) 75 MG tablet, Take 75 mg by mouth Daily., Disp: , Rfl:   •  Emollient (LUBRIDERM LOTION) lotion, Apply 1 application topically to the appropriate area as directed As Needed., Disp: , Rfl:   •  fluticasone (FLONASE) 50 MCG/ACT nasal spray, 2 sprays into the nostril(s) as directed by provider Daily., Disp: , Rfl:   •  isosorbide mononitrate (IMDUR) 30 MG 24 hr tablet, Take 30 mg by mouth Daily., Disp: , Rfl:   •  METOPROLOL TARTRATE PO, Take 25 mg by mouth 2 (Two) Times a Day., Disp: , Rfl:   •  nitroglycerin (NITROSTAT) 0.4 MG SL tablet, Place 0.4 mg under the tongue Every 5 (Five) Minutes As Needed for Chest Pain. Take no more than 3 doses in 15 minutes., Disp: , Rfl:   •  pantoprazole  "(Protonix) 40 MG EC tablet, Take 1 tablet by mouth 30 minutes before breakfast daily., Disp: 30 tablet, Rfl: 5  •  rosuvastatin (CRESTOR) 40 MG tablet, Take 40 mg by mouth Daily., Disp: , Rfl:   •  Trolamine Salicylate (ASPERCREME EX), Apply 1 application topically Daily As Needed (pain)., Disp: , Rfl:   •  Zoster Vac Recomb Adjuvanted 50 MCG/0.5ML reconstituted suspension, Inject per protocol, repeat second vaccine in 2 to 6 months, Disp: 1 each, Rfl: 1    No Known Allergies    Social History     Socioeconomic History   • Marital status:      Spouse name: Not on file   • Number of children: Not on file   • Years of education: Not on file   • Highest education level: Not on file   Tobacco Use   • Smoking status: Former Smoker     Types: Cigarettes     Quit date:      Years since quittin.9   • Smokeless tobacco: Never Used   • Tobacco comment: quit    Substance and Sexual Activity   • Alcohol use: No   • Drug use: No   • Sexual activity: Defer     Family History   Problem Relation Age of Onset   • Colon cancer Father    • Cirrhosis Neg Hx    • Liver cancer Neg Hx    • Liver disease Neg Hx          Objective:   Physical Exam:  /70 (BP Location: Right arm, Patient Position: Sitting)   Pulse 68   Temp 97.5 °F (36.4 °C)   Ht 180.3 cm (71\")   Wt 95.3 kg (210 lb)   SpO2 98%   BMI 29.29 kg/m²   CONSTITUTIONAL: Well-nourished. In no acute distress.   SKIN: Warm and dry. No rashes noted  HEENT: Head is normocephalic and atraumatic. Pupils are equal and reactive to light bilaterally. Mucous membranes are pink and moist.   NECK: Supple without masses or thyromegaly. There is no jugular venous distention   LUNGS: Normal effort. Clear to auscultation bilaterally without wheezing, rhonchi, or rales noted.   CARDIOVASCULAR: Regular rate and rhythm with a normal S1 and S2. There is no murmur, gallop, rub, or click appreciated. Carotid upstrokes are 2+ and symmetrical without bruits.  2+ radial pulses " bilaterally.  There is no peripheral edema.   ABDOMEN: Normal bowel sounds.  Soft and nondistended. No tenderness with palpitation.   MUSCULOSKELETAL:  No digital cyanosis  NEUROLOGICAL: Nonfocal.  PSYCHIATRIC: Alert, orientated x 3, appropriate affect       Labs:  BUN   Date Value Ref Range Status   11/05/2019 13 9 - 24 mg/dL Final     Creatinine   Date Value Ref Range Status   11/05/2019 0.73 0.73 - 1.22 mg/dL Final     Potassium   Date Value Ref Range Status   11/05/2019 4.2 3.7 - 5.1 mmol/L Final     ALT (SGPT)   Date Value Ref Range Status   11/05/2019 15 10 - 54 U/L Final     AST (SGOT)   Date Value Ref Range Status   11/05/2019 18 14 - 40 U/L Final     WBC   Date Value Ref Range Status   11/05/2019 8.65 3.70 - 11.00 k/uL Final     Hemoglobin   Date Value Ref Range Status   11/05/2019 13.2 13.0 - 17.0 g/dL Final     Hematocrit   Date Value Ref Range Status   11/05/2019 41.1 39.0 - 51.0 % Final     Platelets   Date Value Ref Range Status   11/05/2019 368 150 - 400 k/uL Final       No results found for: CHOL  No results found for: TRIG  No results found for: HDL  No results found for: LDL  No components found for: LDLDIRECTC    Diagnostic Data:      ECG 12 Lead    Date/Time: 12/14/2020 5:20 PM  Performed by: Noah Jerez MD  Authorized by: Noah Jerez MD   Comparison: compared with previous ECG from 9/2/2015  Similar to previous ECG  Rhythm: sinus rhythm            Results for orders placed in visit on 04/01/19   SCANNED - ECHOCARDIOGRAM     Cath Nov 2019  Impression:   -aneurysmal dilatation of proximal to mid LAD (with negative FFR 0.88 & Pd/Pa   0.93) with mid LAD ectasia, otherwise mild diffuse disease   -proximal RCA ectasia with mild diffuse disease   -overall, mild non-obstructive CAD     Echo Nov 2019  - Exam indication: Initial evaluation valvular heart disease   - The left ventricle is normal in size. There is mild concentric left ventricular   hypertrophy. Left ventricular systolic function is  normal. EF = 70 ± 5% (2D   biplane)   - The right ventricle is normal in size. Right ventricular systolic function is   normal.   - The left atrial cavity is mildly dilated.   - The visualized aorta is borderline dilated with a maximal dimension of 4.0 cm.   - There is mild to moderate mitral regurgitation.   - The patient has not had a prior CC echocardiographic exam for comparison.     Assessment and Plan:   Diagnoses and all orders for this visit:    Coronary artery disease of native artery of native heart with stable angina pectoris   Coronary ectasia/aneurysm  -Long-term, agree with continuing clopidogrel and apixaban  -Not on aspirin to avoid triple therapy  -Antianginal therapy with beta-blocker, calcium channel blocker, isosorbide mononitrate  -Continue high intensity statin  -Aggressive lifestyle risk factor modification for CAD.    Pre-operative cardiovascular examination  -Intermediate cardiac risk for surgery with no further recommended cardiac interventions at this current time.  Cardiac symptoms are stable.  Okay to proceed with colonoscopy and colon surgery if his cardiac symptoms remain controlled as they are.  -Okay to stop clopidogrel for 5 days prior and apixaban for 48 hours prior to procedures.  When stopping the apixaban 48 hours prior to procedures, would initiate aspirin 81 mg daily that should be continued perioperatively until the apixaban and clopidogrel restarted    - Return in about 1 year (around 12/14/2021) for Next scheduled follow up with an ECG.    Noah Jerez MD, MSc, FACC, [facility]  Interventional Cardiology  [facility]

## 2020-12-14 ENCOUNTER — CONSULT (OUTPATIENT)
Dept: CARDIOLOGY | Facility: CLINIC | Age: 65
End: 2020-12-14

## 2020-12-14 VITALS
WEIGHT: 210 LBS | HEART RATE: 68 BPM | SYSTOLIC BLOOD PRESSURE: 110 MMHG | TEMPERATURE: 97.5 F | OXYGEN SATURATION: 98 % | DIASTOLIC BLOOD PRESSURE: 70 MMHG | HEIGHT: 71 IN | BODY MASS INDEX: 29.4 KG/M2

## 2020-12-14 DIAGNOSIS — Z01.810 PRE-OPERATIVE CARDIOVASCULAR EXAMINATION: ICD-10-CM

## 2020-12-14 DIAGNOSIS — I25.118 CORONARY ARTERY DISEASE OF NATIVE ARTERY OF NATIVE HEART WITH STABLE ANGINA PECTORIS (HCC): Primary | ICD-10-CM

## 2020-12-14 PROCEDURE — 99204 OFFICE O/P NEW MOD 45 MIN: CPT | Performed by: INTERNAL MEDICINE

## 2020-12-14 PROCEDURE — 93000 ELECTROCARDIOGRAM COMPLETE: CPT | Performed by: INTERNAL MEDICINE

## 2020-12-14 RX ORDER — NITROGLYCERIN 0.4 MG/1
0.4 TABLET SUBLINGUAL
COMMUNITY

## 2021-02-25 DIAGNOSIS — Z23 IMMUNIZATION DUE: ICD-10-CM

## 2021-03-01 ENCOUNTER — ANESTHESIA EVENT (OUTPATIENT)
Dept: PERIOP | Facility: HOSPITAL | Age: 66
End: 2021-03-01

## 2021-03-01 ENCOUNTER — APPOINTMENT (OUTPATIENT)
Dept: PREADMISSION TESTING | Facility: HOSPITAL | Age: 66
End: 2021-03-01

## 2021-03-01 VITALS — BODY MASS INDEX: 28.8 KG/M2 | HEIGHT: 71 IN | WEIGHT: 205.69 LBS

## 2021-03-01 LAB
ABO GROUP BLD: NORMAL
ANION GAP SERPL CALCULATED.3IONS-SCNC: 11 MMOL/L (ref 5–15)
BLD GP AB SCN SERPL QL: NEGATIVE
BUN SERPL-MCNC: 9 MG/DL (ref 8–23)
BUN/CREAT SERPL: 11.8 (ref 7–25)
CALCIUM SPEC-SCNC: 9.3 MG/DL (ref 8.6–10.5)
CEA SERPL-MCNC: 2.68 NG/ML
CHLORIDE SERPL-SCNC: 107 MMOL/L (ref 98–107)
CO2 SERPL-SCNC: 24 MMOL/L (ref 22–29)
CREAT SERPL-MCNC: 0.76 MG/DL (ref 0.76–1.27)
DEPRECATED RDW RBC AUTO: 43.2 FL (ref 37–54)
ERYTHROCYTE [DISTWIDTH] IN BLOOD BY AUTOMATED COUNT: 12.3 % (ref 12.3–15.4)
GFR SERPL CREATININE-BSD FRML MDRD: 103 ML/MIN/1.73
GLUCOSE SERPL-MCNC: 89 MG/DL (ref 65–99)
HBA1C MFR BLD: 5.8 % (ref 4.8–5.6)
HCT VFR BLD AUTO: 44.4 % (ref 37.5–51)
HGB BLD-MCNC: 14.3 G/DL (ref 13–17.7)
MCH RBC QN AUTO: 30.8 PG (ref 26.6–33)
MCHC RBC AUTO-ENTMCNC: 32.2 G/DL (ref 31.5–35.7)
MCV RBC AUTO: 95.5 FL (ref 79–97)
PLATELET # BLD AUTO: 396 10*3/MM3 (ref 140–450)
PMV BLD AUTO: 8.5 FL (ref 6–12)
POTASSIUM SERPL-SCNC: 4.3 MMOL/L (ref 3.5–5.2)
RBC # BLD AUTO: 4.65 10*6/MM3 (ref 4.14–5.8)
RH BLD: POSITIVE
SARS-COV-2 RNA RESP QL NAA+PROBE: NOT DETECTED
SODIUM SERPL-SCNC: 142 MMOL/L (ref 136–145)
T&S EXPIRATION DATE: NORMAL
WBC # BLD AUTO: 11.58 10*3/MM3 (ref 3.4–10.8)

## 2021-03-01 PROCEDURE — 36415 COLL VENOUS BLD VENIPUNCTURE: CPT

## 2021-03-01 PROCEDURE — 80048 BASIC METABOLIC PNL TOTAL CA: CPT

## 2021-03-01 PROCEDURE — U0003 INFECTIOUS AGENT DETECTION BY NUCLEIC ACID (DNA OR RNA); SEVERE ACUTE RESPIRATORY SYNDROME CORONAVIRUS 2 (SARS-COV-2) (CORONAVIRUS DISEASE [COVID-19]), AMPLIFIED PROBE TECHNIQUE, MAKING USE OF HIGH THROUGHPUT TECHNOLOGIES AS DESCRIBED BY CMS-2020-01-R: HCPCS

## 2021-03-01 PROCEDURE — 85027 COMPLETE CBC AUTOMATED: CPT

## 2021-03-01 PROCEDURE — C9803 HOPD COVID-19 SPEC COLLECT: HCPCS

## 2021-03-01 PROCEDURE — 83036 HEMOGLOBIN GLYCOSYLATED A1C: CPT

## 2021-03-01 PROCEDURE — 82378 CARCINOEMBRYONIC ANTIGEN: CPT

## 2021-03-01 PROCEDURE — 86900 BLOOD TYPING SEROLOGIC ABO: CPT

## 2021-03-01 PROCEDURE — 86850 RBC ANTIBODY SCREEN: CPT

## 2021-03-01 PROCEDURE — 86901 BLOOD TYPING SEROLOGIC RH(D): CPT

## 2021-03-01 RX ORDER — SODIUM CHLORIDE 0.9 % (FLUSH) 0.9 %
10 SYRINGE (ML) INJECTION EVERY 12 HOURS SCHEDULED
Status: CANCELLED | OUTPATIENT
Start: 2021-03-01

## 2021-03-01 RX ORDER — ASPIRIN 81 MG/1
81 TABLET, CHEWABLE ORAL DAILY
COMMUNITY
End: 2021-12-20

## 2021-03-01 RX ORDER — FAMOTIDINE 10 MG/ML
20 INJECTION, SOLUTION INTRAVENOUS ONCE
Status: CANCELLED | OUTPATIENT
Start: 2021-03-01 | End: 2021-03-01

## 2021-03-01 RX ORDER — SODIUM CHLORIDE 0.9 % (FLUSH) 0.9 %
10 SYRINGE (ML) INJECTION AS NEEDED
Status: CANCELLED | OUTPATIENT
Start: 2021-03-01

## 2021-03-02 ENCOUNTER — ANESTHESIA (OUTPATIENT)
Dept: PERIOP | Facility: HOSPITAL | Age: 66
End: 2021-03-02

## 2021-03-02 ENCOUNTER — HOSPITAL ENCOUNTER (INPATIENT)
Facility: HOSPITAL | Age: 66
LOS: 7 days | Discharge: HOME | End: 2021-03-09
Attending: COLON & RECTAL SURGERY | Admitting: COLON & RECTAL SURGERY

## 2021-03-02 DIAGNOSIS — D49.0 COLON NEOPLASM: ICD-10-CM

## 2021-03-02 DIAGNOSIS — Z90.49 S/P RIGHT COLECTOMY: Primary | ICD-10-CM

## 2021-03-02 PROBLEM — I10 HTN (HYPERTENSION): Status: ACTIVE | Noted: 2021-03-02

## 2021-03-02 PROBLEM — K21.9 GERD (GASTROESOPHAGEAL REFLUX DISEASE): Status: ACTIVE | Noted: 2021-03-02

## 2021-03-02 PROBLEM — E78.5 HYPERLIPIDEMIA: Status: ACTIVE | Noted: 2021-03-02

## 2021-03-02 LAB
ABO GROUP BLD: NORMAL
RH BLD: POSITIVE

## 2021-03-02 PROCEDURE — 25010000002 DEXAMETHASONE SODIUM PHOSPHATE 10 MG/ML SOLUTION: Performed by: ANESTHESIOLOGY

## 2021-03-02 PROCEDURE — 25010000002 FENTANYL CITRATE (PF) 100 MCG/2ML SOLUTION: Performed by: NURSE ANESTHETIST, CERTIFIED REGISTERED

## 2021-03-02 PROCEDURE — 88307 TISSUE EXAM BY PATHOLOGIST: CPT | Performed by: COLON & RECTAL SURGERY

## 2021-03-02 PROCEDURE — 25010000002 HEPARIN (PORCINE) PER 1000 UNITS: Performed by: COLON & RECTAL SURGERY

## 2021-03-02 PROCEDURE — 25010000002 PROPOFOL 10 MG/ML EMULSION: Performed by: NURSE ANESTHETIST, CERTIFIED REGISTERED

## 2021-03-02 PROCEDURE — 25010000002 KETOROLAC TROMETHAMINE PER 15 MG: Performed by: COLON & RECTAL SURGERY

## 2021-03-02 PROCEDURE — 0DTF4ZZ RESECTION OF RIGHT LARGE INTESTINE, PERCUTANEOUS ENDOSCOPIC APPROACH: ICD-10-PCS | Performed by: COLON & RECTAL SURGERY

## 2021-03-02 PROCEDURE — 25010000002 ONDANSETRON PER 1 MG: Performed by: NURSE ANESTHETIST, CERTIFIED REGISTERED

## 2021-03-02 PROCEDURE — 25010000003 LIDOCAINE 1 % SOLUTION: Performed by: NURSE ANESTHETIST, CERTIFIED REGISTERED

## 2021-03-02 PROCEDURE — 25010000002 ERTAPENEM 1 GM/100ML SOLUTION: Performed by: COLON & RECTAL SURGERY

## 2021-03-02 PROCEDURE — 25010000002 NEOSTIGMINE 10 MG/10ML SOLUTION: Performed by: NURSE ANESTHETIST, CERTIFIED REGISTERED

## 2021-03-02 PROCEDURE — 86900 BLOOD TYPING SEROLOGIC ABO: CPT

## 2021-03-02 PROCEDURE — 25810000003 SODIUM CHLORIDE 0.9 % WITH KCL 20 MEQ 20-0.9 MEQ/L-% SOLUTION: Performed by: COLON & RECTAL SURGERY

## 2021-03-02 PROCEDURE — 25010000002 DEXAMETHASONE PER 1 MG: Performed by: NURSE ANESTHETIST, CERTIFIED REGISTERED

## 2021-03-02 PROCEDURE — 25010000002 BUPRENORPHINE PER 0.1 MG: Performed by: ANESTHESIOLOGY

## 2021-03-02 PROCEDURE — 86901 BLOOD TYPING SEROLOGIC RH(D): CPT

## 2021-03-02 DEVICE — PROXIMATE LINEAR CUTTER RELOAD, BLUE, 75MM
Type: IMPLANTABLE DEVICE | Site: ABDOMEN | Status: FUNCTIONAL
Brand: PROXIMATE

## 2021-03-02 DEVICE — PROXIMATE RELOADABLE LINEAR CUTTER WITH SAFETY LOCK-OUT, 75MM
Type: IMPLANTABLE DEVICE | Site: ABDOMEN | Status: FUNCTIONAL
Brand: PROXIMATE

## 2021-03-02 DEVICE — PROXIMATE RELOADABLE LINEAR STAPLER, 60MM
Type: IMPLANTABLE DEVICE | Site: ABDOMEN | Status: FUNCTIONAL
Brand: PROXIMATE

## 2021-03-02 RX ORDER — BACLOFEN 10 MG/1
10 TABLET ORAL DAILY PRN
Status: DISCONTINUED | OUTPATIENT
Start: 2021-03-02 | End: 2021-03-09 | Stop reason: HOSPADM

## 2021-03-02 RX ORDER — ALVIMOPAN 12 MG/1
12 CAPSULE ORAL 2 TIMES DAILY
Status: DISCONTINUED | OUTPATIENT
Start: 2021-03-03 | End: 2021-03-04

## 2021-03-02 RX ORDER — CETIRIZINE HYDROCHLORIDE 10 MG/1
10 TABLET ORAL DAILY
Status: DISCONTINUED | OUTPATIENT
Start: 2021-03-03 | End: 2021-03-09 | Stop reason: HOSPADM

## 2021-03-02 RX ORDER — HEPARIN SODIUM 5000 [USP'U]/ML
5000 INJECTION, SOLUTION INTRAVENOUS; SUBCUTANEOUS ONCE
Status: COMPLETED | OUTPATIENT
Start: 2021-03-02 | End: 2021-03-02

## 2021-03-02 RX ORDER — MELOXICAM 15 MG/1
15 TABLET ORAL ONCE
Status: COMPLETED | OUTPATIENT
Start: 2021-03-02 | End: 2021-03-02

## 2021-03-02 RX ORDER — FENTANYL CITRATE 50 UG/ML
50 INJECTION, SOLUTION INTRAMUSCULAR; INTRAVENOUS
Status: DISCONTINUED | OUTPATIENT
Start: 2021-03-02 | End: 2021-03-02 | Stop reason: HOSPADM

## 2021-03-02 RX ORDER — NALOXONE HCL 0.4 MG/ML
0.4 VIAL (ML) INJECTION AS NEEDED
Status: DISCONTINUED | OUTPATIENT
Start: 2021-03-02 | End: 2021-03-02 | Stop reason: HOSPADM

## 2021-03-02 RX ORDER — SODIUM CHLORIDE, SODIUM LACTATE, POTASSIUM CHLORIDE, CALCIUM CHLORIDE 600; 310; 30; 20 MG/100ML; MG/100ML; MG/100ML; MG/100ML
9 INJECTION, SOLUTION INTRAVENOUS CONTINUOUS
Status: DISCONTINUED | OUTPATIENT
Start: 2021-03-02 | End: 2021-03-06

## 2021-03-02 RX ORDER — HYDROMORPHONE HYDROCHLORIDE 1 MG/ML
0.5 INJECTION, SOLUTION INTRAMUSCULAR; INTRAVENOUS; SUBCUTANEOUS
Status: DISCONTINUED | OUTPATIENT
Start: 2021-03-02 | End: 2021-03-09 | Stop reason: HOSPADM

## 2021-03-02 RX ORDER — FAMOTIDINE 20 MG/1
20 TABLET, FILM COATED ORAL 2 TIMES DAILY
Status: DISCONTINUED | OUTPATIENT
Start: 2021-03-02 | End: 2021-03-03 | Stop reason: SDUPTHER

## 2021-03-02 RX ORDER — NEOSTIGMINE METHYLSULFATE 1 MG/ML
INJECTION, SOLUTION INTRAVENOUS AS NEEDED
Status: DISCONTINUED | OUTPATIENT
Start: 2021-03-02 | End: 2021-03-02 | Stop reason: SURG

## 2021-03-02 RX ORDER — BUPRENORPHINE HYDROCHLORIDE 0.32 MG/ML
INJECTION INTRAMUSCULAR; INTRAVENOUS
Status: COMPLETED | OUTPATIENT
Start: 2021-03-02 | End: 2021-03-02

## 2021-03-02 RX ORDER — HYDROCODONE BITARTRATE AND ACETAMINOPHEN 7.5; 325 MG/1; MG/1
1 TABLET ORAL EVERY 4 HOURS PRN
Status: DISCONTINUED | OUTPATIENT
Start: 2021-03-02 | End: 2021-03-09 | Stop reason: HOSPADM

## 2021-03-02 RX ORDER — TERAZOSIN 1 MG/1
1 CAPSULE ORAL NIGHTLY
Status: DISCONTINUED | OUTPATIENT
Start: 2021-03-02 | End: 2021-03-09 | Stop reason: HOSPADM

## 2021-03-02 RX ORDER — MAGNESIUM HYDROXIDE 1200 MG/15ML
LIQUID ORAL AS NEEDED
Status: DISCONTINUED | OUTPATIENT
Start: 2021-03-02 | End: 2021-03-02 | Stop reason: HOSPADM

## 2021-03-02 RX ORDER — BUSPIRONE HYDROCHLORIDE 5 MG/1
5 TABLET ORAL 2 TIMES DAILY PRN
Status: DISCONTINUED | OUTPATIENT
Start: 2021-03-02 | End: 2021-03-09 | Stop reason: HOSPADM

## 2021-03-02 RX ORDER — PROMETHAZINE HYDROCHLORIDE 25 MG/1
25 TABLET ORAL ONCE AS NEEDED
Status: DISCONTINUED | OUTPATIENT
Start: 2021-03-02 | End: 2021-03-02 | Stop reason: HOSPADM

## 2021-03-02 RX ORDER — BUPIVACAINE HYDROCHLORIDE 2.5 MG/ML
INJECTION, SOLUTION EPIDURAL; INFILTRATION; INTRACAUDAL
Status: COMPLETED | OUTPATIENT
Start: 2021-03-02 | End: 2021-03-02

## 2021-03-02 RX ORDER — SODIUM CHLORIDE 0.9 % (FLUSH) 0.9 %
3-10 SYRINGE (ML) INJECTION AS NEEDED
Status: DISCONTINUED | OUTPATIENT
Start: 2021-03-02 | End: 2021-03-02 | Stop reason: HOSPADM

## 2021-03-02 RX ORDER — ONDANSETRON 4 MG/1
4 TABLET, FILM COATED ORAL EVERY 6 HOURS PRN
Status: DISCONTINUED | OUTPATIENT
Start: 2021-03-02 | End: 2021-03-09 | Stop reason: HOSPADM

## 2021-03-02 RX ORDER — SODIUM CHLORIDE AND POTASSIUM CHLORIDE 150; 900 MG/100ML; MG/100ML
100 INJECTION, SOLUTION INTRAVENOUS CONTINUOUS
Status: DISCONTINUED | OUTPATIENT
Start: 2021-03-02 | End: 2021-03-06

## 2021-03-02 RX ORDER — ONDANSETRON 2 MG/ML
INJECTION INTRAMUSCULAR; INTRAVENOUS AS NEEDED
Status: DISCONTINUED | OUTPATIENT
Start: 2021-03-02 | End: 2021-03-02 | Stop reason: SURG

## 2021-03-02 RX ORDER — GABAPENTIN 300 MG/1
300 CAPSULE ORAL 3 TIMES DAILY
Status: DISPENSED | OUTPATIENT
Start: 2021-03-02 | End: 2021-03-05

## 2021-03-02 RX ORDER — LIDOCAINE HYDROCHLORIDE 10 MG/ML
0.5 INJECTION, SOLUTION EPIDURAL; INFILTRATION; INTRACAUDAL; PERINEURAL ONCE AS NEEDED
Status: COMPLETED | OUTPATIENT
Start: 2021-03-02 | End: 2021-03-02

## 2021-03-02 RX ORDER — ALVIMOPAN 12 MG/1
12 CAPSULE ORAL ONCE
Status: COMPLETED | OUTPATIENT
Start: 2021-03-02 | End: 2021-03-02

## 2021-03-02 RX ORDER — LABETALOL HYDROCHLORIDE 5 MG/ML
10 INJECTION, SOLUTION INTRAVENOUS EVERY 4 HOURS PRN
Status: ACTIVE | OUTPATIENT
Start: 2021-03-02 | End: 2021-03-05

## 2021-03-02 RX ORDER — PROMETHAZINE HYDROCHLORIDE 25 MG/1
25 SUPPOSITORY RECTAL ONCE AS NEEDED
Status: DISCONTINUED | OUTPATIENT
Start: 2021-03-02 | End: 2021-03-02 | Stop reason: HOSPADM

## 2021-03-02 RX ORDER — AMLODIPINE BESYLATE 2.5 MG/1
2.5 TABLET ORAL DAILY
Status: DISCONTINUED | OUTPATIENT
Start: 2021-03-03 | End: 2021-03-03

## 2021-03-02 RX ORDER — ROCURONIUM BROMIDE 10 MG/ML
INJECTION, SOLUTION INTRAVENOUS AS NEEDED
Status: DISCONTINUED | OUTPATIENT
Start: 2021-03-02 | End: 2021-03-02 | Stop reason: SURG

## 2021-03-02 RX ORDER — SODIUM CHLORIDE 0.9 % (FLUSH) 0.9 %
3 SYRINGE (ML) INJECTION EVERY 12 HOURS SCHEDULED
Status: DISCONTINUED | OUTPATIENT
Start: 2021-03-02 | End: 2021-03-02 | Stop reason: HOSPADM

## 2021-03-02 RX ORDER — MIDAZOLAM HYDROCHLORIDE 1 MG/ML
2 INJECTION INTRAMUSCULAR; INTRAVENOUS
Status: DISCONTINUED | OUTPATIENT
Start: 2021-03-02 | End: 2021-03-02 | Stop reason: HOSPADM

## 2021-03-02 RX ORDER — KETOROLAC TROMETHAMINE 15 MG/ML
15 INJECTION, SOLUTION INTRAMUSCULAR; INTRAVENOUS EVERY 6 HOURS
Status: COMPLETED | OUTPATIENT
Start: 2021-03-02 | End: 2021-03-04

## 2021-03-02 RX ORDER — ACETAMINOPHEN 325 MG/1
650 TABLET ORAL EVERY 8 HOURS
Status: DISCONTINUED | OUTPATIENT
Start: 2021-03-02 | End: 2021-03-09 | Stop reason: HOSPADM

## 2021-03-02 RX ORDER — PREGABALIN 75 MG/1
75 CAPSULE ORAL ONCE
Status: COMPLETED | OUTPATIENT
Start: 2021-03-02 | End: 2021-03-02

## 2021-03-02 RX ORDER — SCOLOPAMINE TRANSDERMAL SYSTEM 1 MG/1
1 PATCH, EXTENDED RELEASE TRANSDERMAL CONTINUOUS
Status: ACTIVE | OUTPATIENT
Start: 2021-03-02 | End: 2021-03-05

## 2021-03-02 RX ORDER — DROPERIDOL 2.5 MG/ML
0.62 INJECTION, SOLUTION INTRAMUSCULAR; INTRAVENOUS ONCE AS NEEDED
Status: DISCONTINUED | OUTPATIENT
Start: 2021-03-02 | End: 2021-03-02 | Stop reason: HOSPADM

## 2021-03-02 RX ORDER — DROPERIDOL 2.5 MG/ML
0.62 INJECTION, SOLUTION INTRAMUSCULAR; INTRAVENOUS AS NEEDED
Status: DISCONTINUED | OUTPATIENT
Start: 2021-03-02 | End: 2021-03-02 | Stop reason: HOSPADM

## 2021-03-02 RX ORDER — NITROGLYCERIN 0.4 MG/1
0.4 TABLET SUBLINGUAL
Status: DISCONTINUED | OUTPATIENT
Start: 2021-03-02 | End: 2021-03-09 | Stop reason: HOSPADM

## 2021-03-02 RX ORDER — HYDROCODONE BITARTRATE AND ACETAMINOPHEN 5; 325 MG/1; MG/1
1 TABLET ORAL ONCE AS NEEDED
Status: DISCONTINUED | OUTPATIENT
Start: 2021-03-02 | End: 2021-03-02 | Stop reason: HOSPADM

## 2021-03-02 RX ORDER — ONDANSETRON 2 MG/ML
4 INJECTION INTRAMUSCULAR; INTRAVENOUS EVERY 6 HOURS PRN
Status: DISCONTINUED | OUTPATIENT
Start: 2021-03-02 | End: 2021-03-02 | Stop reason: SDUPTHER

## 2021-03-02 RX ORDER — DIAZEPAM 5 MG/1
5 TABLET ORAL EVERY 6 HOURS PRN
Status: DISCONTINUED | OUTPATIENT
Start: 2021-03-02 | End: 2021-03-09 | Stop reason: HOSPADM

## 2021-03-02 RX ORDER — ACETAMINOPHEN 500 MG
1000 TABLET ORAL ONCE
Status: COMPLETED | OUTPATIENT
Start: 2021-03-02 | End: 2021-03-02

## 2021-03-02 RX ORDER — ROSUVASTATIN CALCIUM 20 MG/1
40 TABLET, COATED ORAL NIGHTLY
Status: DISCONTINUED | OUTPATIENT
Start: 2021-03-02 | End: 2021-03-09 | Stop reason: HOSPADM

## 2021-03-02 RX ORDER — GLYCOPYRROLATE 0.2 MG/ML
INJECTION INTRAMUSCULAR; INTRAVENOUS AS NEEDED
Status: DISCONTINUED | OUTPATIENT
Start: 2021-03-02 | End: 2021-03-02 | Stop reason: SURG

## 2021-03-02 RX ORDER — PANTOPRAZOLE SODIUM 40 MG/1
40 TABLET, DELAYED RELEASE ORAL
Status: DISCONTINUED | OUTPATIENT
Start: 2021-03-03 | End: 2021-03-09 | Stop reason: HOSPADM

## 2021-03-02 RX ORDER — DEXAMETHASONE SODIUM PHOSPHATE 4 MG/ML
INJECTION, SOLUTION INTRA-ARTICULAR; INTRALESIONAL; INTRAMUSCULAR; INTRAVENOUS; SOFT TISSUE AS NEEDED
Status: DISCONTINUED | OUTPATIENT
Start: 2021-03-02 | End: 2021-03-02 | Stop reason: SURG

## 2021-03-02 RX ORDER — ASPIRIN 81 MG/1
81 TABLET, CHEWABLE ORAL DAILY
Status: DISCONTINUED | OUTPATIENT
Start: 2021-03-02 | End: 2021-03-03

## 2021-03-02 RX ORDER — NALOXONE HCL 0.4 MG/ML
0.4 VIAL (ML) INJECTION
Status: DISCONTINUED | OUTPATIENT
Start: 2021-03-02 | End: 2021-03-09 | Stop reason: HOSPADM

## 2021-03-02 RX ORDER — DEXAMETHASONE SODIUM PHOSPHATE 10 MG/ML
INJECTION, SOLUTION INTRAMUSCULAR; INTRAVENOUS
Status: COMPLETED | OUTPATIENT
Start: 2021-03-02 | End: 2021-03-02

## 2021-03-02 RX ORDER — IPRATROPIUM BROMIDE AND ALBUTEROL SULFATE 2.5; .5 MG/3ML; MG/3ML
3 SOLUTION RESPIRATORY (INHALATION) ONCE AS NEEDED
Status: DISCONTINUED | OUTPATIENT
Start: 2021-03-02 | End: 2021-03-02 | Stop reason: HOSPADM

## 2021-03-02 RX ORDER — ONDANSETRON 2 MG/ML
4 INJECTION INTRAMUSCULAR; INTRAVENOUS EVERY 6 HOURS PRN
Status: DISCONTINUED | OUTPATIENT
Start: 2021-03-02 | End: 2021-03-09 | Stop reason: HOSPADM

## 2021-03-02 RX ORDER — FENTANYL CITRATE 50 UG/ML
INJECTION, SOLUTION INTRAMUSCULAR; INTRAVENOUS AS NEEDED
Status: DISCONTINUED | OUTPATIENT
Start: 2021-03-02 | End: 2021-03-02 | Stop reason: SURG

## 2021-03-02 RX ORDER — LIDOCAINE HYDROCHLORIDE 10 MG/ML
INJECTION, SOLUTION INFILTRATION; PERINEURAL AS NEEDED
Status: DISCONTINUED | OUTPATIENT
Start: 2021-03-02 | End: 2021-03-02 | Stop reason: SURG

## 2021-03-02 RX ORDER — ONDANSETRON 2 MG/ML
4 INJECTION INTRAMUSCULAR; INTRAVENOUS ONCE AS NEEDED
Status: DISCONTINUED | OUTPATIENT
Start: 2021-03-02 | End: 2021-03-02 | Stop reason: HOSPADM

## 2021-03-02 RX ORDER — FAMOTIDINE 20 MG/1
20 TABLET, FILM COATED ORAL ONCE
Status: COMPLETED | OUTPATIENT
Start: 2021-03-02 | End: 2021-03-02

## 2021-03-02 RX ORDER — MIDAZOLAM HYDROCHLORIDE 1 MG/ML
1 INJECTION INTRAMUSCULAR; INTRAVENOUS
Status: DISCONTINUED | OUTPATIENT
Start: 2021-03-02 | End: 2021-03-02 | Stop reason: HOSPADM

## 2021-03-02 RX ORDER — MEPERIDINE HYDROCHLORIDE 25 MG/ML
12.5 INJECTION INTRAMUSCULAR; INTRAVENOUS; SUBCUTANEOUS
Status: DISCONTINUED | OUTPATIENT
Start: 2021-03-02 | End: 2021-03-02 | Stop reason: HOSPADM

## 2021-03-02 RX ORDER — PROPOFOL 10 MG/ML
VIAL (ML) INTRAVENOUS AS NEEDED
Status: DISCONTINUED | OUTPATIENT
Start: 2021-03-02 | End: 2021-03-02 | Stop reason: SURG

## 2021-03-02 RX ORDER — HYDROMORPHONE HYDROCHLORIDE 1 MG/ML
0.5 INJECTION, SOLUTION INTRAMUSCULAR; INTRAVENOUS; SUBCUTANEOUS
Status: DISCONTINUED | OUTPATIENT
Start: 2021-03-02 | End: 2021-03-02 | Stop reason: HOSPADM

## 2021-03-02 RX ORDER — HEPARIN SODIUM 5000 [USP'U]/ML
5000 INJECTION, SOLUTION INTRAVENOUS; SUBCUTANEOUS EVERY 8 HOURS SCHEDULED
Status: DISCONTINUED | OUTPATIENT
Start: 2021-03-03 | End: 2021-03-09 | Stop reason: HOSPADM

## 2021-03-02 RX ADMIN — ROSUVASTATIN CALCIUM 40 MG: 20 TABLET, COATED ORAL at 20:14

## 2021-03-02 RX ADMIN — DEXAMETHASONE SODIUM PHOSPHATE 4 MG: 10 INJECTION, SOLUTION INTRAMUSCULAR; INTRAVENOUS at 13:53

## 2021-03-02 RX ADMIN — SODIUM CHLORIDE, POTASSIUM CHLORIDE, SODIUM LACTATE AND CALCIUM CHLORIDE 9 ML/HR: 600; 310; 30; 20 INJECTION, SOLUTION INTRAVENOUS at 12:16

## 2021-03-02 RX ADMIN — FAMOTIDINE 20 MG: 20 TABLET, FILM COATED ORAL at 20:14

## 2021-03-02 RX ADMIN — HYDROCODONE BITARTRATE AND ACETAMINOPHEN 1 TABLET: 7.5; 325 TABLET ORAL at 20:22

## 2021-03-02 RX ADMIN — METOPROLOL TARTRATE 12.5 MG: 25 TABLET, FILM COATED ORAL at 20:14

## 2021-03-02 RX ADMIN — FENTANYL CITRATE 100 MCG: 50 INJECTION, SOLUTION INTRAMUSCULAR; INTRAVENOUS at 13:47

## 2021-03-02 RX ADMIN — KETOROLAC TROMETHAMINE 15 MG: 15 INJECTION, SOLUTION INTRAMUSCULAR; INTRAVENOUS at 17:30

## 2021-03-02 RX ADMIN — NEOSTIGMINE 3 MG: 1 INJECTION INTRAVENOUS at 15:09

## 2021-03-02 RX ADMIN — SCOPALAMINE 1 PATCH: 1 PATCH, EXTENDED RELEASE TRANSDERMAL at 12:22

## 2021-03-02 RX ADMIN — GABAPENTIN 300 MG: 300 CAPSULE ORAL at 20:14

## 2021-03-02 RX ADMIN — PROPOFOL 25 MCG/KG/MIN: 10 INJECTION, EMULSION INTRAVENOUS at 13:52

## 2021-03-02 RX ADMIN — ONDANSETRON 4 MG: 2 INJECTION INTRAMUSCULAR; INTRAVENOUS at 14:45

## 2021-03-02 RX ADMIN — LIDOCAINE HYDROCHLORIDE 50 MG: 10 INJECTION, SOLUTION INFILTRATION; PERINEURAL at 13:47

## 2021-03-02 RX ADMIN — ACETAMINOPHEN 650 MG: 325 TABLET ORAL at 20:14

## 2021-03-02 RX ADMIN — TERAZOSIN HYDROCHLORIDE 1 MG: 1 CAPSULE ORAL at 20:14

## 2021-03-02 RX ADMIN — ROCURONIUM BROMIDE 10 MG: 10 INJECTION INTRAVENOUS at 14:38

## 2021-03-02 RX ADMIN — DEXAMETHASONE SODIUM PHOSPHATE 8 MG: 4 INJECTION, SOLUTION INTRA-ARTICULAR; INTRALESIONAL; INTRAMUSCULAR; INTRAVENOUS; SOFT TISSUE at 13:51

## 2021-03-02 RX ADMIN — POTASSIUM CHLORIDE AND SODIUM CHLORIDE 100 ML/HR: 900; 150 INJECTION, SOLUTION INTRAVENOUS at 16:45

## 2021-03-02 RX ADMIN — FAMOTIDINE 20 MG: 20 TABLET, FILM COATED ORAL at 12:23

## 2021-03-02 RX ADMIN — ASPIRIN 81 MG: 81 TABLET, CHEWABLE ORAL at 17:30

## 2021-03-02 RX ADMIN — BUPRENORPHINE HYDROCHLORIDE 0.3 MG: 0.32 INJECTION INTRAMUSCULAR; INTRAVENOUS at 13:53

## 2021-03-02 RX ADMIN — MELOXICAM 15 MG: 15 TABLET ORAL at 12:23

## 2021-03-02 RX ADMIN — HEPARIN SODIUM 5000 UNITS: 5000 INJECTION, SOLUTION INTRAVENOUS; SUBCUTANEOUS at 12:23

## 2021-03-02 RX ADMIN — PROPOFOL 125 MG: 10 INJECTION, EMULSION INTRAVENOUS at 13:47

## 2021-03-02 RX ADMIN — GLYCOPYRROLATE 0.4 MG: 0.4 INJECTION INTRAMUSCULAR; INTRAVENOUS at 15:09

## 2021-03-02 RX ADMIN — ACETAMINOPHEN 1000 MG: 500 TABLET ORAL at 12:23

## 2021-03-02 RX ADMIN — ALVIMOPAN 12 MG: 12 CAPSULE ORAL at 13:04

## 2021-03-02 RX ADMIN — ROCURONIUM BROMIDE 50 MG: 10 INJECTION INTRAVENOUS at 13:47

## 2021-03-02 RX ADMIN — PREGABALIN 75 MG: 75 CAPSULE ORAL at 12:23

## 2021-03-02 RX ADMIN — ERTAPENEM SODIUM 1 G: 1 INJECTION, POWDER, LYOPHILIZED, FOR SOLUTION INTRAMUSCULAR; INTRAVENOUS at 13:40

## 2021-03-02 RX ADMIN — BUPIVACAINE HYDROCHLORIDE 30 ML: 2.5 INJECTION, SOLUTION EPIDURAL; INFILTRATION; INTRACAUDAL; PERINEURAL at 13:53

## 2021-03-02 RX ADMIN — LIDOCAINE HYDROCHLORIDE 0.5 ML: 10 INJECTION, SOLUTION EPIDURAL; INFILTRATION; INTRACAUDAL; PERINEURAL at 12:16

## 2021-03-02 NOTE — ANESTHESIA POSTPROCEDURE EVALUATION
Patient: Deny Cee Jr.    Procedure Summary     Date: 03/02/21 Room / Location:  [facility] /  [facility]    Anesthesia Start: 1340 Anesthesia Stop:     Procedure: LAPAROSCOPIC -ASSISTED ILEOCOLIC RESECTION, RIGHT COLECTOMY (N/A Abdomen) Diagnosis:     Surgeon: Leonel Cordero MD Provider: Emir Martinez MD    Anesthesia Type: general with block ASA Status: 3          Anesthesia Type: general with block    Vitals  Vitals Value Taken Time   /77 03/02/21 1523   Temp     Pulse 80 03/02/21 1527   Resp     SpO2 93 % 03/02/21 1527   Vitals shown include unvalidated device data.        Post Anesthesia Care and Evaluation    Patient location during evaluation: PACU  Patient participation: complete - patient participated  Level of consciousness: awake and alert  Pain management: adequate  Airway patency: patent  Anesthetic complications: No anesthetic complications  PONV Status: none  Cardiovascular status: hemodynamically stable and acceptable  Respiratory status: nonlabored ventilation, acceptable and nasal cannula  Hydration status: acceptable    Comments: Patient stable to PACU breathing spontaneously on 3L NC. RN at bedside. VSS. T 97.4, /77, HR 80, RR 10, SpO2 92% on 2L NC.

## 2021-03-02 NOTE — ANESTHESIA PROCEDURE NOTES
Peripheral Block      Patient reassessed immediately prior to procedure    Patient location during procedure: OR  Start time: 3/2/2021 1:45 PM  Stop time: 3/2/2021 1:50 PM  Reason for block: at surgeon's request and post-op pain management  Performed by  Anesthesiologist: Emir Martinez MD  Preanesthetic Checklist  Completed: patient identified, site marked, surgical consent, pre-op evaluation, timeout performed, IV checked, risks and benefits discussed and monitors and equipment checked  Prep:  Pt Position: supine  Sterile barriers:cap, gloves, sterile barriers and mask  Prep: ChloraPrep  Patient monitoring: blood pressure monitoring, continuous pulse oximetry and EKG  Procedure  Sedation:yes  Performed under: general  Guidance:ultrasound guided  Images:still images obtained, printed/placed on chart    Laterality:Bilateral  Block Type:TAP  Injection Technique:single-shot  Needle Type:short-bevel and echogenic  Needle Gauge:20 G  Resistance on Injection: none    Medications Used: buprenorphine (BUPRENEX) injection, 0.3 mg  dexamethasone sodium phosphate injection, 4 mg  bupivacaine PF (MARCAINE) 0.25 % injection, 30 mL      Medications  Comment:Block Injection:  LA dose divided between Right and Left block        Post Assessment  Injection Assessment: negative aspiration for heme, incremental injection and no paresthesia on injection  Patient Tolerance:comfortable throughout block  Complications:no  Additional Notes      Under Ultrasound guidance, a BBraun 4inch 360 degree needle was advanced with Normal Saline hydro dissection of tissue.  The Internal Oblique and Transversus Abdominus muscles where visualized.  At or before the aponeurosis of Internal Oblique, local anesthetic spread was visualized in the Transversus Abdominus Plane. Injection was made incrementally with aspiration every 5 mls.  There was no  intravascular injection,  injection pressure was normal, there was no neural injection, and the  procedure was completed without difficulty.  Thank You.

## 2021-03-02 NOTE — ANESTHESIA PREPROCEDURE EVALUATION
Anesthesia Evaluation     Patient summary reviewed and Nursing notes reviewed   NPO Solid Status: > 8 hours  NPO Liquid Status: > 2 hours           Airway   Mallampati: I  TM distance: >3 FB  Neck ROM: full  no difficulty expected  Dental - normal exam   (+) partials and upper dentures    Pulmonary - normal exam   (+) a smoker Former,   Cardiovascular - normal exam    ECG reviewed  PT is on anticoagulation therapy  Patient on routine beta blocker  Rhythm: regular  Rate: normal    (+) hypertension, valvular problems/murmurs murmur and MR, CAD, hyperlipidemia,     ROS comment: Cardiac clearance on chart  Pt intermediate risk per Dr. Jerez     cath Nov 2019  aneurysmal dilation of LAD, mild non obstructive disease    Echo Nov. 2019  Normal EF 70%, mild-moderate MR,     Neuro/Psych  (+) CVA (2013 (no residual sx)), dizziness/light headedness, psychiatric history Anxiety,     GI/Hepatic/Renal/Endo    (+)  GERD well controlled,  renal disease stones,   (-) hepatitis, liver disease, diabetes, no thyroid disorder    Musculoskeletal     (+) back pain,   Abdominal  - normal exam    Abdomen: soft.  Bowel sounds: normal.   Substance History      OB/GYN          Other   arthritis,    history of cancer                    Anesthesia Plan    ASA 3     general with block   (TAP blocks)  intravenous induction     Anesthetic plan, all risks, benefits, and alternatives have been provided, discussed and informed consent has been obtained with: patient.    Plan discussed with CRNA.

## 2021-03-02 NOTE — ANESTHESIA PROCEDURE NOTES
Airway  Urgency: elective    Date/Time: 3/2/2021 1:49 PM  Airway not difficult    General Information and Staff    Patient location during procedure: OR  CRNA: Shabnam Whitfield CRNA    Indications and Patient Condition  Indications for airway management: airway protection    Preoxygenated: yes  MILS not maintained throughout  Mask difficulty assessment: 2 - vent by mask + OA or adjuvant +/- NMBA    Final Airway Details  Final airway type: endotracheal airway      Successful airway: ETT  Cuffed: yes   Successful intubation technique: direct laryngoscopy  Facilitating devices/methods: intubating stylet  Endotracheal tube insertion site: oral  Blade: Sukh  Blade size: 3  ETT size (mm): 7.5  Cormack-Lehane Classification: grade I - full view of glottis  Placement verified by: chest auscultation and capnometry   Cuff volume (mL): 6  Measured from: lips  ETT/EBT  to lips (cm): 21  Number of attempts at approach: 1  Assessment: lips, teeth, and gum same as pre-op and atraumatic intubation    Additional Comments  Negative epigastric sounds, Breath sound equal bilaterally with symmetric chest rise and fall

## 2021-03-03 PROBLEM — D72.829 LEUKOCYTOSIS: Status: ACTIVE | Noted: 2021-03-03

## 2021-03-03 PROBLEM — G89.18 ACUTE POSTOPERATIVE PAIN: Status: ACTIVE | Noted: 2021-03-03

## 2021-03-03 LAB
ANION GAP SERPL CALCULATED.3IONS-SCNC: 10 MMOL/L (ref 5–15)
BASOPHILS # BLD AUTO: 0.02 10*3/MM3 (ref 0–0.2)
BASOPHILS NFR BLD AUTO: 0.1 % (ref 0–1.5)
BUN SERPL-MCNC: 19 MG/DL (ref 8–23)
BUN/CREAT SERPL: 24.7 (ref 7–25)
CALCIUM SPEC-SCNC: 8.4 MG/DL (ref 8.6–10.5)
CHLORIDE SERPL-SCNC: 103 MMOL/L (ref 98–107)
CO2 SERPL-SCNC: 21 MMOL/L (ref 22–29)
CREAT SERPL-MCNC: 0.77 MG/DL (ref 0.76–1.27)
DEPRECATED RDW RBC AUTO: 42.4 FL (ref 37–54)
EOSINOPHIL # BLD AUTO: 0 10*3/MM3 (ref 0–0.4)
EOSINOPHIL NFR BLD AUTO: 0 % (ref 0.3–6.2)
ERYTHROCYTE [DISTWIDTH] IN BLOOD BY AUTOMATED COUNT: 12.2 % (ref 12.3–15.4)
GFR SERPL CREATININE-BSD FRML MDRD: 102 ML/MIN/1.73
GLUCOSE SERPL-MCNC: 126 MG/DL (ref 65–99)
HCT VFR BLD AUTO: 40.3 % (ref 37.5–51)
HGB BLD-MCNC: 13.3 G/DL (ref 13–17.7)
IMM GRANULOCYTES # BLD AUTO: 0.09 10*3/MM3 (ref 0–0.05)
IMM GRANULOCYTES NFR BLD AUTO: 0.6 % (ref 0–0.5)
LYMPHOCYTES # BLD AUTO: 1.72 10*3/MM3 (ref 0.7–3.1)
LYMPHOCYTES NFR BLD AUTO: 10.9 % (ref 19.6–45.3)
MCH RBC QN AUTO: 30.8 PG (ref 26.6–33)
MCHC RBC AUTO-ENTMCNC: 33 G/DL (ref 31.5–35.7)
MCV RBC AUTO: 93.3 FL (ref 79–97)
MONOCYTES # BLD AUTO: 0.7 10*3/MM3 (ref 0.1–0.9)
MONOCYTES NFR BLD AUTO: 4.4 % (ref 5–12)
NEUTROPHILS NFR BLD AUTO: 13.29 10*3/MM3 (ref 1.7–7)
NEUTROPHILS NFR BLD AUTO: 84 % (ref 42.7–76)
NRBC BLD AUTO-RTO: 0 /100 WBC (ref 0–0.2)
PLATELET # BLD AUTO: 356 10*3/MM3 (ref 140–450)
PMV BLD AUTO: 8.9 FL (ref 6–12)
POTASSIUM SERPL-SCNC: 4.8 MMOL/L (ref 3.5–5.2)
RBC # BLD AUTO: 4.32 10*6/MM3 (ref 4.14–5.8)
SODIUM SERPL-SCNC: 134 MMOL/L (ref 136–145)
WBC # BLD AUTO: 15.82 10*3/MM3 (ref 3.4–10.8)

## 2021-03-03 PROCEDURE — 97162 PT EVAL MOD COMPLEX 30 MIN: CPT

## 2021-03-03 PROCEDURE — 25010000002 HYDROMORPHONE PER 4 MG: Performed by: COLON & RECTAL SURGERY

## 2021-03-03 PROCEDURE — 25810000003 SODIUM CHLORIDE 0.9 % WITH KCL 20 MEQ 20-0.9 MEQ/L-% SOLUTION: Performed by: COLON & RECTAL SURGERY

## 2021-03-03 PROCEDURE — 85025 COMPLETE CBC W/AUTO DIFF WBC: CPT | Performed by: COLON & RECTAL SURGERY

## 2021-03-03 PROCEDURE — 97116 GAIT TRAINING THERAPY: CPT

## 2021-03-03 PROCEDURE — 25010000002 HEPARIN (PORCINE) PER 1000 UNITS: Performed by: COLON & RECTAL SURGERY

## 2021-03-03 PROCEDURE — 97530 THERAPEUTIC ACTIVITIES: CPT

## 2021-03-03 PROCEDURE — 80048 BASIC METABOLIC PNL TOTAL CA: CPT | Performed by: COLON & RECTAL SURGERY

## 2021-03-03 PROCEDURE — 25010000002 KETOROLAC TROMETHAMINE PER 15 MG: Performed by: COLON & RECTAL SURGERY

## 2021-03-03 RX ORDER — ASPIRIN 81 MG/1
81 TABLET, CHEWABLE ORAL NIGHTLY
Status: DISCONTINUED | OUTPATIENT
Start: 2021-03-03 | End: 2021-03-09 | Stop reason: HOSPADM

## 2021-03-03 RX ORDER — AMLODIPINE BESYLATE 2.5 MG/1
2.5 TABLET ORAL NIGHTLY
Status: DISCONTINUED | OUTPATIENT
Start: 2021-03-03 | End: 2021-03-09 | Stop reason: HOSPADM

## 2021-03-03 RX ADMIN — POTASSIUM CHLORIDE AND SODIUM CHLORIDE 100 ML/HR: 900; 150 INJECTION, SOLUTION INTRAVENOUS at 10:24

## 2021-03-03 RX ADMIN — ACETAMINOPHEN 650 MG: 325 TABLET ORAL at 04:11

## 2021-03-03 RX ADMIN — GABAPENTIN 300 MG: 300 CAPSULE ORAL at 08:45

## 2021-03-03 RX ADMIN — ASPIRIN 81 MG: 81 TABLET, CHEWABLE ORAL at 20:32

## 2021-03-03 RX ADMIN — HYDROCODONE BITARTRATE AND ACETAMINOPHEN 1 TABLET: 7.5; 325 TABLET ORAL at 00:38

## 2021-03-03 RX ADMIN — HEPARIN SODIUM 5000 UNITS: 5000 INJECTION, SOLUTION INTRAVENOUS; SUBCUTANEOUS at 11:53

## 2021-03-03 RX ADMIN — ACETAMINOPHEN 650 MG: 325 TABLET ORAL at 11:43

## 2021-03-03 RX ADMIN — ACETAMINOPHEN 650 MG: 325 TABLET ORAL at 20:32

## 2021-03-03 RX ADMIN — METOPROLOL TARTRATE 12.5 MG: 25 TABLET, FILM COATED ORAL at 08:45

## 2021-03-03 RX ADMIN — PANTOPRAZOLE SODIUM 40 MG: 40 TABLET, DELAYED RELEASE ORAL at 05:52

## 2021-03-03 RX ADMIN — ASPIRIN 81 MG: 81 TABLET, CHEWABLE ORAL at 08:45

## 2021-03-03 RX ADMIN — HYDROCODONE BITARTRATE AND ACETAMINOPHEN 1 TABLET: 7.5; 325 TABLET ORAL at 10:22

## 2021-03-03 RX ADMIN — KETOROLAC TROMETHAMINE 15 MG: 15 INJECTION, SOLUTION INTRAMUSCULAR; INTRAVENOUS at 00:38

## 2021-03-03 RX ADMIN — HYDROMORPHONE HYDROCHLORIDE 0.5 MG: 1 INJECTION, SOLUTION INTRAMUSCULAR; INTRAVENOUS; SUBCUTANEOUS at 13:27

## 2021-03-03 RX ADMIN — KETOROLAC TROMETHAMINE 15 MG: 15 INJECTION, SOLUTION INTRAMUSCULAR; INTRAVENOUS at 11:43

## 2021-03-03 RX ADMIN — CETIRIZINE HYDROCHLORIDE 10 MG: 10 TABLET, FILM COATED ORAL at 08:45

## 2021-03-03 RX ADMIN — TERAZOSIN HYDROCHLORIDE 1 MG: 1 CAPSULE ORAL at 20:32

## 2021-03-03 RX ADMIN — KETOROLAC TROMETHAMINE 15 MG: 15 INJECTION, SOLUTION INTRAMUSCULAR; INTRAVENOUS at 23:45

## 2021-03-03 RX ADMIN — ROSUVASTATIN CALCIUM 40 MG: 20 TABLET, COATED ORAL at 20:32

## 2021-03-03 RX ADMIN — AMLODIPINE BESYLATE 2.5 MG: 2.5 TABLET ORAL at 08:45

## 2021-03-03 RX ADMIN — HEPARIN SODIUM 5000 UNITS: 5000 INJECTION, SOLUTION INTRAVENOUS; SUBCUTANEOUS at 20:32

## 2021-03-03 RX ADMIN — HEPARIN SODIUM 5000 UNITS: 5000 INJECTION, SOLUTION INTRAVENOUS; SUBCUTANEOUS at 05:51

## 2021-03-03 RX ADMIN — KETOROLAC TROMETHAMINE 15 MG: 15 INJECTION, SOLUTION INTRAMUSCULAR; INTRAVENOUS at 19:14

## 2021-03-03 RX ADMIN — ALVIMOPAN 12 MG: 12 CAPSULE ORAL at 20:36

## 2021-03-03 RX ADMIN — KETOROLAC TROMETHAMINE 15 MG: 15 INJECTION, SOLUTION INTRAMUSCULAR; INTRAVENOUS at 05:52

## 2021-03-04 LAB
BASOPHILS # BLD AUTO: 0.04 10*3/MM3 (ref 0–0.2)
BASOPHILS NFR BLD AUTO: 0.3 % (ref 0–1.5)
CYTO UR: NORMAL
DEPRECATED RDW RBC AUTO: 44.2 FL (ref 37–54)
EOSINOPHIL # BLD AUTO: 0.11 10*3/MM3 (ref 0–0.4)
EOSINOPHIL NFR BLD AUTO: 0.8 % (ref 0.3–6.2)
ERYTHROCYTE [DISTWIDTH] IN BLOOD BY AUTOMATED COUNT: 12.4 % (ref 12.3–15.4)
HCT VFR BLD AUTO: 39.4 % (ref 37.5–51)
HGB BLD-MCNC: 12.5 G/DL (ref 13–17.7)
IMM GRANULOCYTES # BLD AUTO: 0.07 10*3/MM3 (ref 0–0.05)
IMM GRANULOCYTES NFR BLD AUTO: 0.5 % (ref 0–0.5)
LAB AP CASE REPORT: NORMAL
LAB AP CLINICAL INFORMATION: NORMAL
LYMPHOCYTES # BLD AUTO: 3.65 10*3/MM3 (ref 0.7–3.1)
LYMPHOCYTES NFR BLD AUTO: 26.2 % (ref 19.6–45.3)
MCH RBC QN AUTO: 30.6 PG (ref 26.6–33)
MCHC RBC AUTO-ENTMCNC: 31.7 G/DL (ref 31.5–35.7)
MCV RBC AUTO: 96.6 FL (ref 79–97)
MONOCYTES # BLD AUTO: 0.74 10*3/MM3 (ref 0.1–0.9)
MONOCYTES NFR BLD AUTO: 5.3 % (ref 5–12)
NEUTROPHILS NFR BLD AUTO: 66.9 % (ref 42.7–76)
NEUTROPHILS NFR BLD AUTO: 9.3 10*3/MM3 (ref 1.7–7)
NRBC BLD AUTO-RTO: 0 /100 WBC (ref 0–0.2)
PATH REPORT.FINAL DX SPEC: NORMAL
PATH REPORT.GROSS SPEC: NORMAL
PLATELET # BLD AUTO: 301 10*3/MM3 (ref 140–450)
PMV BLD AUTO: 8.8 FL (ref 6–12)
RBC # BLD AUTO: 4.08 10*6/MM3 (ref 4.14–5.8)
WBC # BLD AUTO: 13.91 10*3/MM3 (ref 3.4–10.8)

## 2021-03-04 PROCEDURE — 25010000002 ONDANSETRON PER 1 MG: Performed by: COLON & RECTAL SURGERY

## 2021-03-04 PROCEDURE — 97530 THERAPEUTIC ACTIVITIES: CPT

## 2021-03-04 PROCEDURE — 25010000002 KETOROLAC TROMETHAMINE PER 15 MG: Performed by: COLON & RECTAL SURGERY

## 2021-03-04 PROCEDURE — 85025 COMPLETE CBC W/AUTO DIFF WBC: CPT | Performed by: COLON & RECTAL SURGERY

## 2021-03-04 PROCEDURE — 97116 GAIT TRAINING THERAPY: CPT

## 2021-03-04 PROCEDURE — 97110 THERAPEUTIC EXERCISES: CPT

## 2021-03-04 PROCEDURE — 25010000002 HEPARIN (PORCINE) PER 1000 UNITS: Performed by: COLON & RECTAL SURGERY

## 2021-03-04 RX ADMIN — GABAPENTIN 300 MG: 300 CAPSULE ORAL at 20:00

## 2021-03-04 RX ADMIN — ACETAMINOPHEN 650 MG: 325 TABLET ORAL at 11:25

## 2021-03-04 RX ADMIN — HYDROCODONE BITARTRATE AND ACETAMINOPHEN 1 TABLET: 7.5; 325 TABLET ORAL at 20:05

## 2021-03-04 RX ADMIN — ROSUVASTATIN CALCIUM 40 MG: 20 TABLET, COATED ORAL at 20:00

## 2021-03-04 RX ADMIN — CETIRIZINE HYDROCHLORIDE 10 MG: 10 TABLET, FILM COATED ORAL at 20:00

## 2021-03-04 RX ADMIN — HYDROCODONE BITARTRATE AND ACETAMINOPHEN 1 TABLET: 7.5; 325 TABLET ORAL at 13:49

## 2021-03-04 RX ADMIN — PANTOPRAZOLE SODIUM 40 MG: 40 TABLET, DELAYED RELEASE ORAL at 05:30

## 2021-03-04 RX ADMIN — ASPIRIN 81 MG: 81 TABLET, CHEWABLE ORAL at 20:00

## 2021-03-04 RX ADMIN — KETOROLAC TROMETHAMINE 15 MG: 15 INJECTION, SOLUTION INTRAMUSCULAR; INTRAVENOUS at 05:30

## 2021-03-04 RX ADMIN — ACETAMINOPHEN 650 MG: 325 TABLET ORAL at 05:30

## 2021-03-04 RX ADMIN — ONDANSETRON 4 MG: 2 INJECTION INTRAMUSCULAR; INTRAVENOUS at 17:29

## 2021-03-04 RX ADMIN — AMLODIPINE BESYLATE 2.5 MG: 2.5 TABLET ORAL at 20:00

## 2021-03-04 RX ADMIN — KETOROLAC TROMETHAMINE 15 MG: 15 INJECTION, SOLUTION INTRAMUSCULAR; INTRAVENOUS at 11:24

## 2021-03-04 RX ADMIN — ACETAMINOPHEN 650 MG: 325 TABLET ORAL at 20:00

## 2021-03-04 RX ADMIN — HEPARIN SODIUM 5000 UNITS: 5000 INJECTION, SOLUTION INTRAVENOUS; SUBCUTANEOUS at 20:00

## 2021-03-04 RX ADMIN — TERAZOSIN HYDROCHLORIDE 1 MG: 1 CAPSULE ORAL at 20:00

## 2021-03-04 RX ADMIN — METOPROLOL TARTRATE 12.5 MG: 25 TABLET, FILM COATED ORAL at 20:00

## 2021-03-05 LAB
BASOPHILS # BLD AUTO: 0.04 10*3/MM3 (ref 0–0.2)
BASOPHILS # BLD AUTO: 0.05 10*3/MM3 (ref 0–0.2)
BASOPHILS NFR BLD AUTO: 0.3 % (ref 0–1.5)
BASOPHILS NFR BLD AUTO: 0.4 % (ref 0–1.5)
DEPRECATED RDW RBC AUTO: 43.8 FL (ref 37–54)
DEPRECATED RDW RBC AUTO: 45 FL (ref 37–54)
EOSINOPHIL # BLD AUTO: 0.19 10*3/MM3 (ref 0–0.4)
EOSINOPHIL # BLD AUTO: 0.37 10*3/MM3 (ref 0–0.4)
EOSINOPHIL NFR BLD AUTO: 1.4 % (ref 0.3–6.2)
EOSINOPHIL NFR BLD AUTO: 3 % (ref 0.3–6.2)
ERYTHROCYTE [DISTWIDTH] IN BLOOD BY AUTOMATED COUNT: 12.3 % (ref 12.3–15.4)
ERYTHROCYTE [DISTWIDTH] IN BLOOD BY AUTOMATED COUNT: 12.5 % (ref 12.3–15.4)
HCT VFR BLD AUTO: 43 % (ref 37.5–51)
HCT VFR BLD AUTO: 43.4 % (ref 37.5–51)
HGB BLD-MCNC: 13.5 G/DL (ref 13–17.7)
HGB BLD-MCNC: 13.8 G/DL (ref 13–17.7)
IMM GRANULOCYTES # BLD AUTO: 0.04 10*3/MM3 (ref 0–0.05)
IMM GRANULOCYTES # BLD AUTO: 0.07 10*3/MM3 (ref 0–0.05)
IMM GRANULOCYTES NFR BLD AUTO: 0.3 % (ref 0–0.5)
IMM GRANULOCYTES NFR BLD AUTO: 0.5 % (ref 0–0.5)
LYMPHOCYTES # BLD AUTO: 1.76 10*3/MM3 (ref 0.7–3.1)
LYMPHOCYTES # BLD AUTO: 3.99 10*3/MM3 (ref 0.7–3.1)
LYMPHOCYTES NFR BLD AUTO: 12.8 % (ref 19.6–45.3)
LYMPHOCYTES NFR BLD AUTO: 32.6 % (ref 19.6–45.3)
MCH RBC QN AUTO: 30.3 PG (ref 26.6–33)
MCH RBC QN AUTO: 30.8 PG (ref 26.6–33)
MCHC RBC AUTO-ENTMCNC: 31.1 G/DL (ref 31.5–35.7)
MCHC RBC AUTO-ENTMCNC: 32.1 G/DL (ref 31.5–35.7)
MCV RBC AUTO: 96 FL (ref 79–97)
MCV RBC AUTO: 97.3 FL (ref 79–97)
MONOCYTES # BLD AUTO: 1.03 10*3/MM3 (ref 0.1–0.9)
MONOCYTES # BLD AUTO: 1.24 10*3/MM3 (ref 0.1–0.9)
MONOCYTES NFR BLD AUTO: 10.1 % (ref 5–12)
MONOCYTES NFR BLD AUTO: 7.5 % (ref 5–12)
NEUTROPHILS NFR BLD AUTO: 10.62 10*3/MM3 (ref 1.7–7)
NEUTROPHILS NFR BLD AUTO: 53.6 % (ref 42.7–76)
NEUTROPHILS NFR BLD AUTO: 6.56 10*3/MM3 (ref 1.7–7)
NEUTROPHILS NFR BLD AUTO: 77.5 % (ref 42.7–76)
NRBC BLD AUTO-RTO: 0 /100 WBC (ref 0–0.2)
NRBC BLD AUTO-RTO: 0 /100 WBC (ref 0–0.2)
PLATELET # BLD AUTO: 308 10*3/MM3 (ref 140–450)
PLATELET # BLD AUTO: 329 10*3/MM3 (ref 140–450)
PMV BLD AUTO: 8.8 FL (ref 6–12)
PMV BLD AUTO: 8.9 FL (ref 6–12)
RBC # BLD AUTO: 4.46 10*6/MM3 (ref 4.14–5.8)
RBC # BLD AUTO: 4.48 10*6/MM3 (ref 4.14–5.8)
WBC # BLD AUTO: 12.25 10*3/MM3 (ref 3.4–10.8)
WBC # BLD AUTO: 13.71 10*3/MM3 (ref 3.4–10.8)

## 2021-03-05 PROCEDURE — 85025 COMPLETE CBC W/AUTO DIFF WBC: CPT | Performed by: COLON & RECTAL SURGERY

## 2021-03-05 PROCEDURE — 25010000002 PIPERACILLIN SOD-TAZOBACTAM PER 1 G: Performed by: COLON & RECTAL SURGERY

## 2021-03-05 PROCEDURE — 25010000002 HEPARIN (PORCINE) PER 1000 UNITS: Performed by: COLON & RECTAL SURGERY

## 2021-03-05 PROCEDURE — 25010000002 ONDANSETRON PER 1 MG: Performed by: COLON & RECTAL SURGERY

## 2021-03-05 RX ORDER — CALCIUM CARBONATE 200(500)MG
2 TABLET,CHEWABLE ORAL 3 TIMES DAILY PRN
Status: DISCONTINUED | OUTPATIENT
Start: 2021-03-05 | End: 2021-03-09 | Stop reason: HOSPADM

## 2021-03-05 RX ORDER — SIMETHICONE 80 MG
80 TABLET,CHEWABLE ORAL 4 TIMES DAILY PRN
Status: DISCONTINUED | OUTPATIENT
Start: 2021-03-05 | End: 2021-03-09 | Stop reason: HOSPADM

## 2021-03-05 RX ADMIN — ONDANSETRON 4 MG: 2 INJECTION INTRAMUSCULAR; INTRAVENOUS at 15:15

## 2021-03-05 RX ADMIN — HEPARIN SODIUM 5000 UNITS: 5000 INJECTION, SOLUTION INTRAVENOUS; SUBCUTANEOUS at 05:44

## 2021-03-05 RX ADMIN — GABAPENTIN 300 MG: 300 CAPSULE ORAL at 09:38

## 2021-03-05 RX ADMIN — ACETAMINOPHEN 650 MG: 325 TABLET ORAL at 20:45

## 2021-03-05 RX ADMIN — ONDANSETRON 4 MG: 2 INJECTION INTRAMUSCULAR; INTRAVENOUS at 20:46

## 2021-03-05 RX ADMIN — TERAZOSIN HYDROCHLORIDE 1 MG: 1 CAPSULE ORAL at 20:45

## 2021-03-05 RX ADMIN — HEPARIN SODIUM 5000 UNITS: 5000 INJECTION, SOLUTION INTRAVENOUS; SUBCUTANEOUS at 14:29

## 2021-03-05 RX ADMIN — ROSUVASTATIN CALCIUM 40 MG: 20 TABLET, COATED ORAL at 20:45

## 2021-03-05 RX ADMIN — HYDROCODONE BITARTRATE AND ACETAMINOPHEN 1 TABLET: 7.5; 325 TABLET ORAL at 09:40

## 2021-03-05 RX ADMIN — PANTOPRAZOLE SODIUM 40 MG: 40 TABLET, DELAYED RELEASE ORAL at 05:43

## 2021-03-05 RX ADMIN — ASPIRIN 81 MG: 81 TABLET, CHEWABLE ORAL at 20:45

## 2021-03-05 RX ADMIN — ACETAMINOPHEN 650 MG: 325 TABLET ORAL at 05:43

## 2021-03-05 RX ADMIN — TAZOBACTAM SODIUM AND PIPERACILLIN SODIUM 4.5 G: 500; 4 INJECTION, SOLUTION INTRAVENOUS at 21:17

## 2021-03-05 RX ADMIN — ANTACID TABLETS 2 TABLET: 500 TABLET, CHEWABLE ORAL at 14:28

## 2021-03-05 RX ADMIN — HEPARIN SODIUM 5000 UNITS: 5000 INJECTION, SOLUTION INTRAVENOUS; SUBCUTANEOUS at 20:46

## 2021-03-05 RX ADMIN — CETIRIZINE HYDROCHLORIDE 10 MG: 10 TABLET, FILM COATED ORAL at 20:45

## 2021-03-05 RX ADMIN — METOPROLOL TARTRATE 12.5 MG: 25 TABLET, FILM COATED ORAL at 20:45

## 2021-03-05 RX ADMIN — GABAPENTIN 300 MG: 300 CAPSULE ORAL at 20:45

## 2021-03-05 RX ADMIN — SIMETHICONE 80 MG: 80 TABLET, CHEWABLE ORAL at 09:50

## 2021-03-05 RX ADMIN — AMLODIPINE BESYLATE 2.5 MG: 2.5 TABLET ORAL at 20:45

## 2021-03-05 RX ADMIN — ACETAMINOPHEN 650 MG: 325 TABLET ORAL at 12:05

## 2021-03-06 LAB
ANION GAP SERPL CALCULATED.3IONS-SCNC: 12 MMOL/L (ref 5–15)
BACTERIA UR QL AUTO: ABNORMAL /HPF
BASOPHILS # BLD AUTO: 0.02 10*3/MM3 (ref 0–0.2)
BASOPHILS NFR BLD AUTO: 0.2 % (ref 0–1.5)
BILIRUB UR QL STRIP: NEGATIVE
BUN SERPL-MCNC: 17 MG/DL (ref 8–23)
BUN/CREAT SERPL: 25.8 (ref 7–25)
CALCIUM SPEC-SCNC: 8.5 MG/DL (ref 8.6–10.5)
CHLORIDE SERPL-SCNC: 102 MMOL/L (ref 98–107)
CLARITY UR: CLEAR
CO2 SERPL-SCNC: 23 MMOL/L (ref 22–29)
COLOR UR: YELLOW
CREAT SERPL-MCNC: 0.66 MG/DL (ref 0.76–1.27)
DEPRECATED RDW RBC AUTO: 42.2 FL (ref 37–54)
EOSINOPHIL # BLD AUTO: 0.08 10*3/MM3 (ref 0–0.4)
EOSINOPHIL NFR BLD AUTO: 0.8 % (ref 0.3–6.2)
ERYTHROCYTE [DISTWIDTH] IN BLOOD BY AUTOMATED COUNT: 12.4 % (ref 12.3–15.4)
GFR SERPL CREATININE-BSD FRML MDRD: 122 ML/MIN/1.73
GLUCOSE SERPL-MCNC: 103 MG/DL (ref 65–99)
GLUCOSE UR STRIP-MCNC: NEGATIVE MG/DL
HCT VFR BLD AUTO: 42.6 % (ref 37.5–51)
HGB BLD-MCNC: 13.8 G/DL (ref 13–17.7)
HGB UR QL STRIP.AUTO: ABNORMAL
HYALINE CASTS UR QL AUTO: ABNORMAL /LPF
IMM GRANULOCYTES # BLD AUTO: 0.04 10*3/MM3 (ref 0–0.05)
IMM GRANULOCYTES NFR BLD AUTO: 0.4 % (ref 0–0.5)
KETONES UR QL STRIP: ABNORMAL
LEUKOCYTE ESTERASE UR QL STRIP.AUTO: NEGATIVE
LYMPHOCYTES # BLD AUTO: 1.98 10*3/MM3 (ref 0.7–3.1)
LYMPHOCYTES NFR BLD AUTO: 18.6 % (ref 19.6–45.3)
MCH RBC QN AUTO: 30.1 PG (ref 26.6–33)
MCHC RBC AUTO-ENTMCNC: 32.4 G/DL (ref 31.5–35.7)
MCV RBC AUTO: 93 FL (ref 79–97)
MONOCYTES # BLD AUTO: 0.94 10*3/MM3 (ref 0.1–0.9)
MONOCYTES NFR BLD AUTO: 8.8 % (ref 5–12)
NEUTROPHILS NFR BLD AUTO: 7.6 10*3/MM3 (ref 1.7–7)
NEUTROPHILS NFR BLD AUTO: 71.2 % (ref 42.7–76)
NITRITE UR QL STRIP: NEGATIVE
NRBC BLD AUTO-RTO: 0 /100 WBC (ref 0–0.2)
PH UR STRIP.AUTO: 6.5 [PH] (ref 5–8)
PLATELET # BLD AUTO: 331 10*3/MM3 (ref 140–450)
PMV BLD AUTO: 9 FL (ref 6–12)
POTASSIUM SERPL-SCNC: 4.1 MMOL/L (ref 3.5–5.2)
PROT UR QL STRIP: ABNORMAL
RBC # BLD AUTO: 4.58 10*6/MM3 (ref 4.14–5.8)
RBC # UR: ABNORMAL /HPF
REF LAB TEST METHOD: ABNORMAL
SODIUM SERPL-SCNC: 137 MMOL/L (ref 136–145)
SP GR UR STRIP: 1.03 (ref 1–1.03)
SQUAMOUS #/AREA URNS HPF: ABNORMAL /HPF
UROBILINOGEN UR QL STRIP: ABNORMAL
WBC # BLD AUTO: 10.66 10*3/MM3 (ref 3.4–10.8)
WBC UR QL AUTO: ABNORMAL /HPF

## 2021-03-06 PROCEDURE — 25010000002 PIPERACILLIN SOD-TAZOBACTAM PER 1 G: Performed by: COLON & RECTAL SURGERY

## 2021-03-06 PROCEDURE — 80048 BASIC METABOLIC PNL TOTAL CA: CPT | Performed by: COLON & RECTAL SURGERY

## 2021-03-06 PROCEDURE — 25010000002 ONDANSETRON PER 1 MG: Performed by: COLON & RECTAL SURGERY

## 2021-03-06 PROCEDURE — 25010000002 HEPARIN (PORCINE) PER 1000 UNITS: Performed by: COLON & RECTAL SURGERY

## 2021-03-06 PROCEDURE — 81001 URINALYSIS AUTO W/SCOPE: CPT | Performed by: COLON & RECTAL SURGERY

## 2021-03-06 PROCEDURE — 25010000002 HYDROMORPHONE PER 4 MG: Performed by: COLON & RECTAL SURGERY

## 2021-03-06 PROCEDURE — 85025 COMPLETE CBC W/AUTO DIFF WBC: CPT | Performed by: COLON & RECTAL SURGERY

## 2021-03-06 RX ORDER — DEXTROSE, SODIUM CHLORIDE, AND POTASSIUM CHLORIDE 5; .45; .15 G/100ML; G/100ML; G/100ML
125 INJECTION INTRAVENOUS CONTINUOUS
Status: DISCONTINUED | OUTPATIENT
Start: 2021-03-06 | End: 2021-03-08

## 2021-03-06 RX ADMIN — TAZOBACTAM SODIUM AND PIPERACILLIN SODIUM 4.5 G: 500; 4 INJECTION, SOLUTION INTRAVENOUS at 13:06

## 2021-03-06 RX ADMIN — ASPIRIN 81 MG: 81 TABLET, CHEWABLE ORAL at 20:18

## 2021-03-06 RX ADMIN — HEPARIN SODIUM 5000 UNITS: 5000 INJECTION, SOLUTION INTRAVENOUS; SUBCUTANEOUS at 13:06

## 2021-03-06 RX ADMIN — HEPARIN SODIUM 5000 UNITS: 5000 INJECTION, SOLUTION INTRAVENOUS; SUBCUTANEOUS at 05:25

## 2021-03-06 RX ADMIN — POTASSIUM CHLORIDE, DEXTROSE MONOHYDRATE AND SODIUM CHLORIDE 125 ML/HR: 150; 5; 450 INJECTION, SOLUTION INTRAVENOUS at 16:08

## 2021-03-06 RX ADMIN — TAZOBACTAM SODIUM AND PIPERACILLIN SODIUM 4.5 G: 500; 4 INJECTION, SOLUTION INTRAVENOUS at 05:24

## 2021-03-06 RX ADMIN — PANTOPRAZOLE SODIUM 40 MG: 40 TABLET, DELAYED RELEASE ORAL at 05:25

## 2021-03-06 RX ADMIN — ONDANSETRON 4 MG: 2 INJECTION INTRAMUSCULAR; INTRAVENOUS at 06:15

## 2021-03-06 RX ADMIN — HYDROMORPHONE HYDROCHLORIDE 0.5 MG: 1 INJECTION, SOLUTION INTRAMUSCULAR; INTRAVENOUS; SUBCUTANEOUS at 19:39

## 2021-03-06 RX ADMIN — AMLODIPINE BESYLATE 2.5 MG: 2.5 TABLET ORAL at 20:18

## 2021-03-06 RX ADMIN — TAZOBACTAM SODIUM AND PIPERACILLIN SODIUM 4.5 G: 500; 4 INJECTION, SOLUTION INTRAVENOUS at 21:16

## 2021-03-06 RX ADMIN — CETIRIZINE HYDROCHLORIDE 10 MG: 10 TABLET, FILM COATED ORAL at 20:18

## 2021-03-06 RX ADMIN — POTASSIUM CHLORIDE, DEXTROSE MONOHYDRATE AND SODIUM CHLORIDE 125 ML/HR: 150; 5; 450 INJECTION, SOLUTION INTRAVENOUS at 06:46

## 2021-03-07 LAB
ANION GAP SERPL CALCULATED.3IONS-SCNC: 6 MMOL/L (ref 5–15)
BASOPHILS # BLD AUTO: 0.03 10*3/MM3 (ref 0–0.2)
BASOPHILS NFR BLD AUTO: 0.3 % (ref 0–1.5)
BUN SERPL-MCNC: 11 MG/DL (ref 8–23)
BUN/CREAT SERPL: 13.8 (ref 7–25)
CALCIUM SPEC-SCNC: 8.7 MG/DL (ref 8.6–10.5)
CHLORIDE SERPL-SCNC: 105 MMOL/L (ref 98–107)
CO2 SERPL-SCNC: 26 MMOL/L (ref 22–29)
CREAT SERPL-MCNC: 0.8 MG/DL (ref 0.76–1.27)
DEPRECATED RDW RBC AUTO: 42.3 FL (ref 37–54)
EOSINOPHIL # BLD AUTO: 0.27 10*3/MM3 (ref 0–0.4)
EOSINOPHIL NFR BLD AUTO: 2.3 % (ref 0.3–6.2)
ERYTHROCYTE [DISTWIDTH] IN BLOOD BY AUTOMATED COUNT: 12.3 % (ref 12.3–15.4)
GFR SERPL CREATININE-BSD FRML MDRD: 97 ML/MIN/1.73
GLUCOSE SERPL-MCNC: 107 MG/DL (ref 65–99)
HCT VFR BLD AUTO: 42.2 % (ref 37.5–51)
HGB BLD-MCNC: 13.8 G/DL (ref 13–17.7)
IMM GRANULOCYTES # BLD AUTO: 0.05 10*3/MM3 (ref 0–0.05)
IMM GRANULOCYTES NFR BLD AUTO: 0.4 % (ref 0–0.5)
LYMPHOCYTES # BLD AUTO: 3.17 10*3/MM3 (ref 0.7–3.1)
LYMPHOCYTES NFR BLD AUTO: 27.2 % (ref 19.6–45.3)
MCH RBC QN AUTO: 30.5 PG (ref 26.6–33)
MCHC RBC AUTO-ENTMCNC: 32.7 G/DL (ref 31.5–35.7)
MCV RBC AUTO: 93.4 FL (ref 79–97)
MONOCYTES # BLD AUTO: 1.25 10*3/MM3 (ref 0.1–0.9)
MONOCYTES NFR BLD AUTO: 10.7 % (ref 5–12)
NEUTROPHILS NFR BLD AUTO: 59.1 % (ref 42.7–76)
NEUTROPHILS NFR BLD AUTO: 6.89 10*3/MM3 (ref 1.7–7)
NRBC BLD AUTO-RTO: 0 /100 WBC (ref 0–0.2)
PLATELET # BLD AUTO: 330 10*3/MM3 (ref 140–450)
PMV BLD AUTO: 9 FL (ref 6–12)
POTASSIUM SERPL-SCNC: 4.4 MMOL/L (ref 3.5–5.2)
RBC # BLD AUTO: 4.52 10*6/MM3 (ref 4.14–5.8)
SODIUM SERPL-SCNC: 137 MMOL/L (ref 136–145)
WBC # BLD AUTO: 11.66 10*3/MM3 (ref 3.4–10.8)

## 2021-03-07 PROCEDURE — 80048 BASIC METABOLIC PNL TOTAL CA: CPT | Performed by: COLON & RECTAL SURGERY

## 2021-03-07 PROCEDURE — 85025 COMPLETE CBC W/AUTO DIFF WBC: CPT | Performed by: COLON & RECTAL SURGERY

## 2021-03-07 PROCEDURE — 25010000002 HEPARIN (PORCINE) PER 1000 UNITS: Performed by: COLON & RECTAL SURGERY

## 2021-03-07 PROCEDURE — 25010000002 PIPERACILLIN SOD-TAZOBACTAM PER 1 G: Performed by: COLON & RECTAL SURGERY

## 2021-03-07 RX ADMIN — TAZOBACTAM SODIUM AND PIPERACILLIN SODIUM 4.5 G: 500; 4 INJECTION, SOLUTION INTRAVENOUS at 12:34

## 2021-03-07 RX ADMIN — POTASSIUM CHLORIDE, DEXTROSE MONOHYDRATE AND SODIUM CHLORIDE 125 ML/HR: 150; 5; 450 INJECTION, SOLUTION INTRAVENOUS at 08:39

## 2021-03-07 RX ADMIN — PANTOPRAZOLE SODIUM 40 MG: 40 TABLET, DELAYED RELEASE ORAL at 05:31

## 2021-03-07 RX ADMIN — TAZOBACTAM SODIUM AND PIPERACILLIN SODIUM 4.5 G: 500; 4 INJECTION, SOLUTION INTRAVENOUS at 04:25

## 2021-03-07 RX ADMIN — CETIRIZINE HYDROCHLORIDE 10 MG: 10 TABLET, FILM COATED ORAL at 21:48

## 2021-03-07 RX ADMIN — POTASSIUM CHLORIDE, DEXTROSE MONOHYDRATE AND SODIUM CHLORIDE 125 ML/HR: 150; 5; 450 INJECTION, SOLUTION INTRAVENOUS at 22:38

## 2021-03-07 RX ADMIN — METOPROLOL TARTRATE 12.5 MG: 25 TABLET, FILM COATED ORAL at 21:43

## 2021-03-07 RX ADMIN — HYDROCODONE BITARTRATE AND ACETAMINOPHEN 1 TABLET: 7.5; 325 TABLET ORAL at 21:48

## 2021-03-07 RX ADMIN — TAZOBACTAM SODIUM AND PIPERACILLIN SODIUM 4.5 G: 500; 4 INJECTION, SOLUTION INTRAVENOUS at 22:00

## 2021-03-07 RX ADMIN — TERAZOSIN HYDROCHLORIDE 1 MG: 1 CAPSULE ORAL at 21:48

## 2021-03-07 RX ADMIN — AMLODIPINE BESYLATE 2.5 MG: 2.5 TABLET ORAL at 21:48

## 2021-03-07 RX ADMIN — HEPARIN SODIUM 5000 UNITS: 5000 INJECTION, SOLUTION INTRAVENOUS; SUBCUTANEOUS at 21:45

## 2021-03-07 RX ADMIN — ROSUVASTATIN CALCIUM 40 MG: 20 TABLET, COATED ORAL at 21:48

## 2021-03-07 RX ADMIN — ASPIRIN 81 MG: 81 TABLET, CHEWABLE ORAL at 21:48

## 2021-03-07 RX ADMIN — HEPARIN SODIUM 5000 UNITS: 5000 INJECTION, SOLUTION INTRAVENOUS; SUBCUTANEOUS at 05:30

## 2021-03-07 RX ADMIN — HEPARIN SODIUM 5000 UNITS: 5000 INJECTION, SOLUTION INTRAVENOUS; SUBCUTANEOUS at 15:01

## 2021-03-08 LAB
ANION GAP SERPL CALCULATED.3IONS-SCNC: 7 MMOL/L (ref 5–15)
BASOPHILS # BLD AUTO: 0.05 10*3/MM3 (ref 0–0.2)
BASOPHILS NFR BLD AUTO: 0.5 % (ref 0–1.5)
BUN SERPL-MCNC: 8 MG/DL (ref 8–23)
BUN/CREAT SERPL: 10.1 (ref 7–25)
CALCIUM SPEC-SCNC: 8.5 MG/DL (ref 8.6–10.5)
CHLORIDE SERPL-SCNC: 107 MMOL/L (ref 98–107)
CO2 SERPL-SCNC: 24 MMOL/L (ref 22–29)
CREAT SERPL-MCNC: 0.79 MG/DL (ref 0.76–1.27)
DEPRECATED RDW RBC AUTO: 42.2 FL (ref 37–54)
EOSINOPHIL # BLD AUTO: 0.34 10*3/MM3 (ref 0–0.4)
EOSINOPHIL NFR BLD AUTO: 3.5 % (ref 0.3–6.2)
ERYTHROCYTE [DISTWIDTH] IN BLOOD BY AUTOMATED COUNT: 12.1 % (ref 12.3–15.4)
GFR SERPL CREATININE-BSD FRML MDRD: 99 ML/MIN/1.73
GLUCOSE SERPL-MCNC: 107 MG/DL (ref 65–99)
HCT VFR BLD AUTO: 40.2 % (ref 37.5–51)
HGB BLD-MCNC: 13 G/DL (ref 13–17.7)
IMM GRANULOCYTES # BLD AUTO: 0.06 10*3/MM3 (ref 0–0.05)
IMM GRANULOCYTES NFR BLD AUTO: 0.6 % (ref 0–0.5)
LYMPHOCYTES # BLD AUTO: 2.99 10*3/MM3 (ref 0.7–3.1)
LYMPHOCYTES NFR BLD AUTO: 30.4 % (ref 19.6–45.3)
MCH RBC QN AUTO: 30.2 PG (ref 26.6–33)
MCHC RBC AUTO-ENTMCNC: 32.3 G/DL (ref 31.5–35.7)
MCV RBC AUTO: 93.3 FL (ref 79–97)
MONOCYTES # BLD AUTO: 1.23 10*3/MM3 (ref 0.1–0.9)
MONOCYTES NFR BLD AUTO: 12.5 % (ref 5–12)
NEUTROPHILS NFR BLD AUTO: 5.17 10*3/MM3 (ref 1.7–7)
NEUTROPHILS NFR BLD AUTO: 52.5 % (ref 42.7–76)
NRBC BLD AUTO-RTO: 0 /100 WBC (ref 0–0.2)
PLATELET # BLD AUTO: 306 10*3/MM3 (ref 140–450)
PMV BLD AUTO: 8.8 FL (ref 6–12)
POTASSIUM SERPL-SCNC: 4.1 MMOL/L (ref 3.5–5.2)
PREALB SERPL-MCNC: 17.4 MG/DL (ref 20–40)
RBC # BLD AUTO: 4.31 10*6/MM3 (ref 4.14–5.8)
SODIUM SERPL-SCNC: 138 MMOL/L (ref 136–145)
WBC # BLD AUTO: 9.84 10*3/MM3 (ref 3.4–10.8)

## 2021-03-08 PROCEDURE — 84134 ASSAY OF PREALBUMIN: CPT | Performed by: COLON & RECTAL SURGERY

## 2021-03-08 PROCEDURE — 85025 COMPLETE CBC W/AUTO DIFF WBC: CPT | Performed by: COLON & RECTAL SURGERY

## 2021-03-08 PROCEDURE — 25010000002 PIPERACILLIN SOD-TAZOBACTAM PER 1 G: Performed by: COLON & RECTAL SURGERY

## 2021-03-08 PROCEDURE — 80048 BASIC METABOLIC PNL TOTAL CA: CPT | Performed by: COLON & RECTAL SURGERY

## 2021-03-08 PROCEDURE — 25010000002 HEPARIN (PORCINE) PER 1000 UNITS: Performed by: COLON & RECTAL SURGERY

## 2021-03-08 RX ADMIN — ACETAMINOPHEN 650 MG: 325 TABLET ORAL at 12:28

## 2021-03-08 RX ADMIN — ROSUVASTATIN CALCIUM 40 MG: 20 TABLET, COATED ORAL at 20:09

## 2021-03-08 RX ADMIN — HEPARIN SODIUM 5000 UNITS: 5000 INJECTION, SOLUTION INTRAVENOUS; SUBCUTANEOUS at 20:10

## 2021-03-08 RX ADMIN — HEPARIN SODIUM 5000 UNITS: 5000 INJECTION, SOLUTION INTRAVENOUS; SUBCUTANEOUS at 05:08

## 2021-03-08 RX ADMIN — PANTOPRAZOLE SODIUM 40 MG: 40 TABLET, DELAYED RELEASE ORAL at 05:09

## 2021-03-08 RX ADMIN — CETIRIZINE HYDROCHLORIDE 10 MG: 10 TABLET, FILM COATED ORAL at 20:09

## 2021-03-08 RX ADMIN — TERAZOSIN HYDROCHLORIDE 1 MG: 1 CAPSULE ORAL at 20:09

## 2021-03-08 RX ADMIN — ACETAMINOPHEN 650 MG: 325 TABLET ORAL at 20:09

## 2021-03-08 RX ADMIN — TAZOBACTAM SODIUM AND PIPERACILLIN SODIUM 4.5 G: 500; 4 INJECTION, SOLUTION INTRAVENOUS at 12:28

## 2021-03-08 RX ADMIN — POTASSIUM CHLORIDE, DEXTROSE MONOHYDRATE AND SODIUM CHLORIDE 125 ML/HR: 150; 5; 450 INJECTION, SOLUTION INTRAVENOUS at 05:58

## 2021-03-08 RX ADMIN — TAZOBACTAM SODIUM AND PIPERACILLIN SODIUM 4.5 G: 500; 4 INJECTION, SOLUTION INTRAVENOUS at 03:40

## 2021-03-08 RX ADMIN — ASPIRIN 81 MG: 81 TABLET, CHEWABLE ORAL at 20:09

## 2021-03-09 VITALS
DIASTOLIC BLOOD PRESSURE: 64 MMHG | TEMPERATURE: 96.9 F | SYSTOLIC BLOOD PRESSURE: 122 MMHG | HEIGHT: 71 IN | OXYGEN SATURATION: 96 % | HEART RATE: 73 BPM | RESPIRATION RATE: 18 BRPM | BODY MASS INDEX: 27.5 KG/M2 | WEIGHT: 196.4 LBS

## 2021-03-09 PROBLEM — K91.89 POSTOPERATIVE ILEUS (HCC): Status: ACTIVE | Noted: 2021-03-09

## 2021-03-09 PROBLEM — K56.7 POSTOPERATIVE ILEUS: Status: ACTIVE | Noted: 2021-03-09

## 2021-03-09 LAB
BASOPHILS # BLD AUTO: 0.05 10*3/MM3 (ref 0–0.2)
BASOPHILS NFR BLD AUTO: 0.4 % (ref 0–1.5)
DEPRECATED RDW RBC AUTO: 42.5 FL (ref 37–54)
EOSINOPHIL # BLD AUTO: 0.29 10*3/MM3 (ref 0–0.4)
EOSINOPHIL NFR BLD AUTO: 2.5 % (ref 0.3–6.2)
ERYTHROCYTE [DISTWIDTH] IN BLOOD BY AUTOMATED COUNT: 12.5 % (ref 12.3–15.4)
HCT VFR BLD AUTO: 41.5 % (ref 37.5–51)
HGB BLD-MCNC: 13.7 G/DL (ref 13–17.7)
IMM GRANULOCYTES # BLD AUTO: 0.09 10*3/MM3 (ref 0–0.05)
IMM GRANULOCYTES NFR BLD AUTO: 0.8 % (ref 0–0.5)
LYMPHOCYTES # BLD AUTO: 2.88 10*3/MM3 (ref 0.7–3.1)
LYMPHOCYTES NFR BLD AUTO: 25 % (ref 19.6–45.3)
MCH RBC QN AUTO: 30.6 PG (ref 26.6–33)
MCHC RBC AUTO-ENTMCNC: 33 G/DL (ref 31.5–35.7)
MCV RBC AUTO: 92.6 FL (ref 79–97)
MONOCYTES # BLD AUTO: 1.08 10*3/MM3 (ref 0.1–0.9)
MONOCYTES NFR BLD AUTO: 9.4 % (ref 5–12)
NEUTROPHILS NFR BLD AUTO: 61.9 % (ref 42.7–76)
NEUTROPHILS NFR BLD AUTO: 7.11 10*3/MM3 (ref 1.7–7)
NRBC BLD AUTO-RTO: 0 /100 WBC (ref 0–0.2)
PLATELET # BLD AUTO: 341 10*3/MM3 (ref 140–450)
PMV BLD AUTO: 9 FL (ref 6–12)
RBC # BLD AUTO: 4.48 10*6/MM3 (ref 4.14–5.8)
WBC # BLD AUTO: 11.5 10*3/MM3 (ref 3.4–10.8)

## 2021-03-09 PROCEDURE — 85025 COMPLETE CBC W/AUTO DIFF WBC: CPT | Performed by: COLON & RECTAL SURGERY

## 2021-03-09 RX ORDER — CLOPIDOGREL BISULFATE 75 MG/1
75 TABLET ORAL DAILY
Qty: 30 TABLET
Start: 2021-03-10 | End: 2023-01-09 | Stop reason: ALTCHOICE

## 2021-03-09 RX ORDER — AMOXICILLIN AND CLAVULANATE POTASSIUM 875; 125 MG/1; MG/1
1 TABLET, FILM COATED ORAL 2 TIMES DAILY
Qty: 14 TABLET | Refills: 0 | Status: SHIPPED | OUTPATIENT
Start: 2021-03-09 | End: 2021-09-29

## 2021-03-09 RX ORDER — HYDROCODONE BITARTRATE AND ACETAMINOPHEN 7.5; 325 MG/1; MG/1
1 TABLET ORAL EVERY 4 HOURS PRN
Start: 2021-03-09 | End: 2021-03-12

## 2021-03-09 RX ADMIN — ACETAMINOPHEN 650 MG: 325 TABLET ORAL at 04:41

## 2021-03-09 RX ADMIN — PANTOPRAZOLE SODIUM 40 MG: 40 TABLET, DELAYED RELEASE ORAL at 06:26

## 2021-03-10 ENCOUNTER — READMISSION MANAGEMENT (OUTPATIENT)
Dept: CALL CENTER | Facility: HOSPITAL | Age: 66
End: 2021-03-10

## 2021-03-10 NOTE — OUTREACH NOTE
Prep Survey      Responses   [facility] facility patient discharged from?  [place]   Is LACE score < 7 ?  No   Emergency Room discharge w/ pulse ox?  No   Eligibility  Readm Mgmt   Discharge diagnosis   Cecal neoplasia   Does the patient have one of the following disease processes/diagnoses(primary or secondary)?  General Surgery   Does the patient have Home health ordered?  No   Is there a DME ordered?  No   Prep survey completed?  Yes          Patricia Santos RN

## 2021-03-13 ENCOUNTER — READMISSION MANAGEMENT (OUTPATIENT)
Dept: CALL CENTER | Facility: HOSPITAL | Age: 66
End: 2021-03-13

## 2021-03-13 NOTE — OUTREACH NOTE
General Surgery Week 1 Survey      Responses   [facility] patient discharged from?  [place]   Does the patient have one of the following disease processes/diagnoses(primary or secondary)?  General Surgery   Week 1 attempt successful?  Yes   Call start time  1422   Call end time  1428   Discharge diagnosis   Cecal neoplasia   Meds reviewed with patient/caregiver?  Yes   Is the patient having any side effects they believe may be caused by any medication additions or changes?  No   Does the patient have all medications related to this admission filled (includes all antibiotics, pain medications, etc.)  Yes   Is the patient taking all medications as directed (includes completed medication regime)?  Yes   Does the patient have a follow up appointment scheduled with their surgeon?  Yes   Has the patient kept scheduled appointments due by today?  N/A   Has home health visited the patient within 72 hours of discharge?  N/A   Psychosocial issues?  No   Did the patient receive a copy of their discharge instructions?  Yes   Nursing interventions  Reviewed instructions with patient   What is the patient's perception of their health status since discharge?  Improving   Nursing interventions  Nurse provided patient education   Is the patient /caregiver able to teach back basic post-op care?  Continue use of incentive spirometry at least 1 week post discharge, Practice 'cough and deep breath', Drive as instructed by MD in discharge instructions, Take showers only when approved by MD-sponge bathe until then, Keep incision areas clean,dry and protected, Lifting as instructed by MD in discharge instructions   Is the patient/caregiver able to teach back signs and symptoms of incisional infection?  Fever, Pus or odor from incision, Incisional warmth, Increased drainage or bleeding, Increased redness, swelling or pain at the incisonal site   Is the patient/caregiver able to teach back steps to recovery at home?  Set small,  achievable goals for return to baseline health, Rest and rebuild strength, gradually increase activity, Eat a well-balance diet, Make a list of questions for surgeon's appointment   If the patient is a current smoker, are they able to teach back resources for cessation?  Not a smoker   Is the patient/caregiver able to teach back the hierarchy of who to call/visit for symptoms/problems? PCP, Specialist, Home health nurse, Urgent Care, ED, 911  Yes   Additional teach back comments  He says the incision is a bit puffier at night, but ok overall. Denies fever or above s/s of infection. States 3 or more BMS per day and sometimes thicker.   Week 1 call completed?  Yes   Wrap up additional comments  Improving, will attempt to see PCP this week.          Christal Richardson RN

## 2021-03-19 ENCOUNTER — READMISSION MANAGEMENT (OUTPATIENT)
Dept: CALL CENTER | Facility: HOSPITAL | Age: 66
End: 2021-03-19

## 2021-03-19 NOTE — OUTREACH NOTE
General Surgery Week 2 Survey      Responses   [facility] patient discharged from?  [place]   Does the patient have one of the following disease processes/diagnoses(primary or secondary)?  General Surgery   Week 2 attempt successful?  Yes   Call start time  1152   Call end time  1154   Discharge diagnosis  Cecal neoplasia   Meds reviewed with patient/caregiver?  Yes   Is the patient taking all medications as directed (includes completed medication regime)?  Yes   Has the patient kept scheduled appointments due by today?  N/A   Comments  Appt with Dr. Cordero is on 3/25/21,  PCP is a hospitalist and is only in the office on certain days. He's spoken with PCP since discharge.    What is the patient's perception of their health status since discharge?  Improving   Nursing interventions  Nurse provided patient education   Is the patient/caregiver able to teach back signs and symptoms of incisional infection?  Fever   Is the patient/caregiver able to teach back steps to recovery at home?  Rest and rebuild strength, gradually increase activity, Eat a well-balance diet [Pt is eating small portions. He has urgency with stools. Stools are firm but back to normal. ]   Week 2 call completed?  Yes          Savannah Pompa RN

## 2021-03-26 DIAGNOSIS — N40.1 BPH WITH URINARY OBSTRUCTION: ICD-10-CM

## 2021-03-26 DIAGNOSIS — N13.8 BPH WITH URINARY OBSTRUCTION: ICD-10-CM

## 2021-03-29 RX ORDER — ALFUZOSIN HYDROCHLORIDE 10 MG/1
10 TABLET, EXTENDED RELEASE ORAL DAILY
Qty: 90 TABLET | Refills: 3 | Status: SHIPPED | OUTPATIENT
Start: 2021-03-29 | End: 2022-03-10 | Stop reason: SDUPTHER

## 2021-03-30 ENCOUNTER — READMISSION MANAGEMENT (OUTPATIENT)
Dept: CALL CENTER | Facility: HOSPITAL | Age: 66
End: 2021-03-30

## 2021-03-30 NOTE — OUTREACH NOTE
General Surgery Week 3 Survey      Responses   [facility] patient discharged from?  [place]   Does the patient have one of the following disease processes/diagnoses(primary or secondary)?  General Surgery   Week 3 attempt successful?  Yes   Call start time  0939   Call end time  0942   Discharge diagnosis  Cecal neoplasia   Meds reviewed with patient/caregiver?  Yes   Is the patient having any side effects they believe may be caused by any medication additions or changes?  No   Does the patient have all medications related to this admission filled (includes all antibiotics, pain medications, etc.)  Yes   Is the patient taking all medications as directed (includes completed medication regime)?  Yes   Does the patient have a follow up appointment scheduled with their surgeon?  Yes   Has the patient kept scheduled appointments due by today?  Yes   Has home health visited the patient within 72 hours of discharge?  N/A   Psychosocial issues?  No   Did the patient receive a copy of their discharge instructions?  Yes   Nursing interventions  Reviewed instructions with patient   What is the patient's perception of their health status since discharge?  Improving   Nursing interventions  Nurse provided patient education   Is the patient /caregiver able to teach back basic post-op care?  Continue use of incentive spirometry at least 1 week post discharge, Practice 'cough and deep breath', Drive as instructed by MD in discharge instructions, Take showers only when approved by MD-sponge bathe until then, Keep incision areas clean,dry and protected, Lifting as instructed by MD in discharge instructions   Is the patient/caregiver able to teach back signs and symptoms of incisional infection?  Fever, Increased redness, swelling or pain at the incisonal site, Increased drainage or bleeding, Incisional warmth, Pus or odor from incision   Is the patient/caregiver able to teach back steps to recovery at home?  Rest and rebuild  strength, gradually increase activity, Set small, achievable goals for return to baseline health   If the patient is a current smoker, are they able to teach back resources for cessation?  Not a smoker   Is the patient/caregiver able to teach back the hierarchy of who to call/visit for symptoms/problems? PCP, Specialist, Home health nurse, Urgent Care, ED, 911  Yes   Week 3 call completed?  Yes   Revoked  No further contact(revokes)-requires comment   Is the patient interested in additional calls from an ambulatory ?  NOTE:  applies to high risk patients requiring additional follow-up.  No   Graduated/Revoked comments  Patient has met all goals. He is healing well, AND SEEN HIS SURGEON.    Wrap up additional comments  HE WAS TREATED VERY WELL AT [facility] AND SO HAPPY WITH HIS CARE          Umer Torrez RN

## 2021-05-25 NOTE — PROGRESS NOTES
Subjective   Patient ID: Deny Cee is a 63 y.o. right hand dominant male  Follow-up of the Left Foot and Follow-up of the Left Knee         History of Present Illness  Patient was originally following up for second digit distal phalanx fracture.  He states he is having no pain.  He denies numbness or tingling to the foot or toes.  He states the abrasion to the toe has healed.  He completed his antibiotics.  The date of injury for his foot was 10/25/2018      Patient would like to discuss continued left knee pain.  He states he has strengthened his knee with physical therapy but still has aching throbbing pain at times worse with certain activity.  He began having knee pain around August 20, 2018 with no known injury he did have an increase in activity the week before.  He's had an MRI which reveals chondromalacia patella as well as a meniscal tear.      Pain Score: 4  Pain Location: Foot  Pain Orientation: Left     Pain Descriptors: Aching              Aggravating Factors: Standing, Walking                   Past Medical History:   Diagnosis Date   • Allergic rhinitis    • Anxiety    • Back pain    • CAD (coronary artery disease)    • Cancer (CMS/[facility])     SKIN CANCER ON NOSE , PRECANCER WITH LAST COLONOSCOPY   • CHF (congestive heart failure) (CMS/[facility])     [facility] [place]. WILL SEE IN JAN. 2018   • Enlarged prostate    • GERD (gastroesophageal reflux disease)    • Heart aneurysm    • Hyperlipidemia    • Hypertension    • Hypogonadism in male    • Kidney cysts    • Kidney stones     PASSED    • Osteoarthritis    • Renal cyst    • Restless leg syndrome    • Sinus problem    • Stroke (CMS/[facility])    • Vertigo    • Vision problem         Past Surgical History:   Procedure Laterality Date   • BACK SURGERY  09/03/2015   • COLONOSCOPY  2006       Family History   Problem Relation Age of Onset   • Colon cancer Father        Social History     Socioeconomic History   • Marital  status:      Spouse name: Not on file   • Number of children: Not on file   • Years of education: Not on file   • Highest education level: Not on file   Social Needs   • Financial resource strain: Not on file   • Food insecurity - worry: Not on file   • Food insecurity - inability: Not on file   • Transportation needs - medical: Not on file   • Transportation needs - non-medical: Not on file   Occupational History   • Not on file   Tobacco Use   • Smoking status: Former Smoker   • Smokeless tobacco: Never Used   • Tobacco comment: quit 2014   Substance and Sexual Activity   • Alcohol use: No   • Drug use: No   • Sexual activity: Defer   Other Topics Concern   • Not on file   Social History Narrative   • Not on file         Current Outpatient Medications:   •  acetaminophen (TYLENOL) 500 MG tablet, Tylenol 500 mg tablet, Disp: , Rfl:   •  AFLURIA QUADRIVALENT 0.5 ML suspension prefilled syringe injection, ADM 0.5ML IM UTD, Disp: , Rfl: 0  •  amLODIPine-benazepril (LOTREL) 10-20 MG per capsule, Take 1 capsule by mouth Daily., Disp: , Rfl:   •  apixaban (ELIQUIS) 5 MG tablet tablet, Take 5 mg by mouth 2 (Two) Times a Day., Disp: , Rfl:   •  aspirin 81 MG oral suspension, aspirin, Disp: , Rfl:   •  baclofen (LIORESAL) 10 MG/20ML injection, by Intrathecal route 1 (One) Time., Disp: , Rfl:   •  busPIRone (BUSPAR) 10 MG tablet, buspirone, Disp: , Rfl:   •  carvedilol (COREG) 1.5625 MG half tablet, carvedilol, Disp: , Rfl:   •  Cetirizine HCl (ZYRTEC ALLERGY) 10 MG capsule, Zyrtec 10 mg tablet, Disp: , Rfl:   •  clopidogrel (PLAVIX) 75 MG tablet, Take 75 mg by mouth daily., Disp: , Rfl:   •  famotidine (PEPCID) 20 MG tablet, Take 1 tablet by mouth 2 (Two) Times a Day., Disp: 60 tablet, Rfl: 3  •  finasteride (PROSCAR) 5 MG tablet, Take 1 tablet by mouth Daily., Disp: 90 tablet, Rfl: 3  •  Hyaluronan (ORTHOVISC) 30 MG/2ML solution prefilled syringe injection, Inject 2 mL into the appropriate joint as directed by  "provider 1 (One) Time Per Week for 3 doses. Once weekly x 3, right knee, Disp: 6 mL, Rfl: 0  •  isosorbide mononitrate (IMDUR) 30 MG 24 hr tablet, Isosorbide Mononitrate CR 30 mg tablet,extended release  Every morning, Disp: , Rfl:   •  METOPROLOL TARTRATE PO, Take 25 mg by mouth Daily., Disp: , Rfl:   •  Omeprazole (PRILOSEC PO), omeprazole, Disp: , Rfl:   •  rosuvastatin (CRESTOR) 10 MG tablet, Crestor 10 mg tablet  Daily, Disp: , Rfl:     No Known Allergies    Review of Systems   Musculoskeletal: Positive for arthralgias.       Objective   Resp 18   Ht 177.8 cm (70\")   Wt 95.3 kg (210 lb)   BMI 30.13 kg/m²    Physical Exam   Constitutional: He is oriented to person, place, and time. He appears well-nourished.   Eyes: Conjunctivae are normal.   Pulmonary/Chest: No respiratory distress.   Musculoskeletal:        Left knee: He exhibits no effusion, no ecchymosis and no erythema. Tenderness found. Medial joint line, lateral joint line and patellar tendon tenderness noted.        Left ankle: He exhibits no swelling. No tenderness.        Left foot: There is no tenderness, no bony tenderness, no swelling, normal capillary refill, no crepitus and no deformity.   Neurological: He is alert and oriented to person, place, and time.   Skin: Capillary refill takes less than 2 seconds.   Psychiatric: He has a normal mood and affect.   Vitals reviewed.    Left Knee Exam     Other   Effusion: no effusion present           The previous noted superficial abrasion to the top of the second left digit has healed well.  No signs of infection  Extremity DVT signs are Negative on physical exam with negative Aracelis sign, with no calf pain, with no palpable cords, with no increased pain with passive stretch/extension and with no skin tone change   Neurologic Exam     Mental Status   Oriented to person, place, and time.      Left Knee Exam     Tenderness   The patient is experiencing tenderness in the medial joint line, lateral joint " line, patellar tendon.               Assessment/Plan   Independent Review of Radiographic Studies:    Indication to evaluate fracture healing, and compared with prior imaging, shows interm fracture healing, callus formation and or periostitis in continued good position and alignment.- left foot xray        Procedures       Deny was seen today for follow-up and follow-up.    Diagnoses and all orders for this visit:    Fracture follow-up  -     XR Foot 3+ View Left    Closed nondisplaced fracture of distal phalanx of lesser toe of left foot, initial encounter    Patellar tendinitis, left knee    Acute medial meniscus tear of left knee, initial encounter    Chondromalacia of patellofemoral joint, left       Orthopedic activities reviewed and patient expressed appreciation  Discussion of orthopedic goals  Risk, benefits, and merits of treatment alternatives reviewed with the patient and questions answered  The nature of the proposed surgery reviewed with the patient including risks, benefits, rehabilitation, recovery timeframe, and outcome expectations  Ice, heat, and/or modalities as beneficial    Recommendations/Plan:  Exercise, medications, injections, other patient advice, and return appointment as noted.  Patient is encouraged to call or return for any issues or concerns.    I did have a lengthy discussion with the patient and his wife in regards to left knee treatment.  We discussed the option of repeat cortisone versus Visco supplementation injection versus knee arthroscopy.  I am leery about proceeding with surgery due to his extensive past medical history and being on to anticoagulants.  I would rather try conservative measures and he is in agreement with Visco supplementation injections  Patient agreeable to call or return sooner for any concerns.        [place]

## 2021-06-29 ENCOUNTER — OFFICE VISIT (OUTPATIENT)
Dept: UROLOGY | Facility: CLINIC | Age: 66
End: 2021-06-29

## 2021-06-29 VITALS
DIASTOLIC BLOOD PRESSURE: 72 MMHG | SYSTOLIC BLOOD PRESSURE: 110 MMHG | HEART RATE: 77 BPM | OXYGEN SATURATION: 97 % | RESPIRATION RATE: 16 BRPM | TEMPERATURE: 98.6 F

## 2021-06-29 DIAGNOSIS — N40.1 BENIGN PROSTATIC HYPERPLASIA WITH LOWER URINARY TRACT SYMPTOMS, SYMPTOM DETAILS UNSPECIFIED: Primary | ICD-10-CM

## 2021-06-29 DIAGNOSIS — E29.1 HYPOGONADISM MALE: ICD-10-CM

## 2021-06-29 LAB
BILIRUB BLD-MCNC: NEGATIVE MG/DL
CLARITY, POC: CLEAR
COLOR UR: YELLOW
GLUCOSE UR STRIP-MCNC: NEGATIVE MG/DL
KETONES UR QL: NEGATIVE
LEUKOCYTE EST, POC: NEGATIVE
NITRITE UR-MCNC: NEGATIVE MG/ML
PH UR: 6 [PH] (ref 5–8)
PROT UR STRIP-MCNC: NEGATIVE MG/DL
RBC # UR STRIP: ABNORMAL /UL
SP GR UR: ABNORMAL (ref 1–1.03)
UROBILINOGEN UR QL: NORMAL

## 2021-06-29 PROCEDURE — 81003 URINALYSIS AUTO W/O SCOPE: CPT | Performed by: UROLOGY

## 2021-06-29 PROCEDURE — 99214 OFFICE O/P EST MOD 30 MIN: CPT | Performed by: UROLOGY

## 2021-06-29 RX ORDER — TESTOSTERONE CYPIONATE 200 MG/ML
400 INJECTION, SOLUTION INTRAMUSCULAR
Qty: 4 ML | Refills: 2 | Status: SHIPPED | OUTPATIENT
Start: 2021-06-29 | End: 2021-09-29 | Stop reason: SDUPTHER
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
## 2021-07-21 ENCOUNTER — CLINICAL SUPPORT (OUTPATIENT)
Dept: UROLOGY | Facility: CLINIC | Age: 66
End: 2021-07-21

## 2021-07-21 DIAGNOSIS — E29.1 HYPOGONADISM MALE: Primary | ICD-10-CM

## 2021-07-21 DIAGNOSIS — E29.1 PRIMARY HYPOGONADISM IN MALE: ICD-10-CM

## 2021-07-21 DIAGNOSIS — N40.1 BENIGN PROSTATIC HYPERPLASIA WITH LOWER URINARY TRACT SYMPTOMS, SYMPTOM DETAILS UNSPECIFIED: ICD-10-CM

## 2021-07-21 PROCEDURE — 96372 THER/PROPH/DIAG INJ SC/IM: CPT | Performed by: UROLOGY

## 2021-07-21 RX ADMIN — TESTOSTERONE CYPIONATE 400 MG: 200 INJECTION, SOLUTION INTRAMUSCULAR at 11:17

## 2021-07-27 DIAGNOSIS — E29.1 HYPOGONADISM MALE: Primary | ICD-10-CM

## 2021-07-27 RX ORDER — TESTOSTERONE CYPIONATE 200 MG/ML
400 INJECTION, SOLUTION INTRAMUSCULAR ONCE
Status: COMPLETED | OUTPATIENT
Start: 2021-07-27 | End: 2021-07-21

## 2021-07-27 NOTE — PROGRESS NOTES
2ml of testosterone injected im into the right dorsogluteal. No site reactions or complications.   NDC 4681-3564-91  Lot [id number]  Exp 01/01/2024

## 2021-08-11 ENCOUNTER — CLINICAL SUPPORT (OUTPATIENT)
Dept: UROLOGY | Facility: CLINIC | Age: 66
End: 2021-08-11

## 2021-08-11 DIAGNOSIS — E29.1 HYPOGONADISM IN MALE: ICD-10-CM

## 2021-08-11 DIAGNOSIS — E29.1 HYPOGONADISM MALE: Primary | ICD-10-CM

## 2021-08-11 PROCEDURE — 96372 THER/PROPH/DIAG INJ SC/IM: CPT | Performed by: UROLOGY

## 2021-08-11 RX ORDER — TESTOSTERONE CYPIONATE 200 MG/ML
400 INJECTION, SOLUTION INTRAMUSCULAR
Status: DISCONTINUED | OUTPATIENT
Start: 2021-08-11 | End: 2021-12-20

## 2021-08-11 RX ADMIN — TESTOSTERONE CYPIONATE 400 MG: 200 INJECTION, SOLUTION INTRAMUSCULAR at 15:35

## 2021-08-11 NOTE — PROGRESS NOTES
400 mg given IM in the Left dorsoglutel. No site reaction or complications.  NDC 3511-2258-86  Lot [id number]  Exp 01/01/2024  MS/CMA

## 2021-08-16 ENCOUNTER — OFFICE VISIT (OUTPATIENT)
Dept: ORTHOPEDIC SURGERY | Facility: CLINIC | Age: 66
End: 2021-08-16

## 2021-08-16 VITALS
HEIGHT: 71 IN | WEIGHT: 196.43 LBS | SYSTOLIC BLOOD PRESSURE: 134 MMHG | DIASTOLIC BLOOD PRESSURE: 67 MMHG | BODY MASS INDEX: 27.5 KG/M2 | HEART RATE: 64 BPM

## 2021-08-16 DIAGNOSIS — M25.561 ACUTE PAIN OF RIGHT KNEE: Primary | ICD-10-CM

## 2021-08-16 DIAGNOSIS — S83.411A SPRAIN OF MEDIAL COLLATERAL LIGAMENT OF RIGHT KNEE, INITIAL ENCOUNTER: ICD-10-CM

## 2021-08-16 DIAGNOSIS — M17.11 PRIMARY OSTEOARTHRITIS OF RIGHT KNEE: ICD-10-CM

## 2021-08-16 PROCEDURE — 99204 OFFICE O/P NEW MOD 45 MIN: CPT | Performed by: PHYSICIAN ASSISTANT
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
## 2021-08-23 ENCOUNTER — TELEPHONE (OUTPATIENT)
Dept: ORTHOPEDIC SURGERY | Facility: CLINIC | Age: 66
End: 2021-08-23

## 2021-08-23 NOTE — TELEPHONE ENCOUNTER
I called patient and explained what Gricelda said and he understood and will call back if he has any further questions or concerns.  Negin

## 2021-08-23 NOTE — TELEPHONE ENCOUNTER
Patient is having increased knee pain and swelling.   He's having swelling in the knee brace and without it.

## 2021-08-23 NOTE — TELEPHONE ENCOUNTER
Patient needs to do the physical therapy that was discussed see if this can help with the inflammation and swelling.  He is also been to have inflammation and swelling because he is on both Plavix and Eliquis.  When I see him back on 9/20/2021 if he still having pain and swelling then we can proceed with the MRI.

## 2021-08-23 NOTE — TELEPHONE ENCOUNTER
Gricelda,    Patient is still having swelling in the knee. He is using the brace and that helps him from twisting the knee but the as the day goes on his swelling gets worse. He has not got the compressions stockings, I recommended he get those, he also has not been elevating high enough. I explained how to do so and he is going to proceed with eleavting higher using ice and the compression socks.  I didn't know if you wanted to go ahead and put an order in for the MRI as they have been backed up and he may have to wait several weeks for one anyway. Please advise thank you!  Negin

## 2021-09-01 ENCOUNTER — CLINICAL SUPPORT (OUTPATIENT)
Dept: UROLOGY | Facility: CLINIC | Age: 66
End: 2021-09-01

## 2021-09-01 DIAGNOSIS — E29.1 HYPOGONADISM IN MALE: ICD-10-CM

## 2021-09-01 PROCEDURE — 96372 THER/PROPH/DIAG INJ SC/IM: CPT | Performed by: UROLOGY

## 2021-09-01 RX ADMIN — TESTOSTERONE CYPIONATE 400 MG: 200 INJECTION, SOLUTION INTRAMUSCULAR at 09:46

## 2021-09-01 NOTE — PROGRESS NOTES
pt supplied med. 400mg DepoTesto given IM in the Left dorsogluteal. No site reaction or complication.   MS/MA    NDC 3448-5325-32  Lot [id number]  Exp 01/01/2024

## 2021-09-20 ENCOUNTER — OFFICE VISIT (OUTPATIENT)
Dept: ORTHOPEDIC SURGERY | Facility: CLINIC | Age: 66
End: 2021-09-20

## 2021-09-20 VITALS
BODY MASS INDEX: 28.7 KG/M2 | HEART RATE: 63 BPM | SYSTOLIC BLOOD PRESSURE: 121 MMHG | HEIGHT: 71 IN | WEIGHT: 205 LBS | DIASTOLIC BLOOD PRESSURE: 59 MMHG

## 2021-09-20 DIAGNOSIS — S83.411A SPRAIN OF MEDIAL COLLATERAL LIGAMENT OF RIGHT KNEE, INITIAL ENCOUNTER: ICD-10-CM

## 2021-09-20 DIAGNOSIS — M23.91 INTERNAL DERANGEMENT OF RIGHT KNEE: ICD-10-CM

## 2021-09-20 DIAGNOSIS — M17.11 PRIMARY OSTEOARTHRITIS OF RIGHT KNEE: Primary | ICD-10-CM

## 2021-09-20 PROCEDURE — 99213 OFFICE O/P EST LOW 20 MIN: CPT | Performed by: PHYSICIAN ASSISTANT
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
## 2021-09-22 ENCOUNTER — CLINICAL SUPPORT (OUTPATIENT)
Dept: UROLOGY | Facility: CLINIC | Age: 66
End: 2021-09-22

## 2021-09-22 DIAGNOSIS — E29.1 HYPOGONADISM IN MALE: Primary | ICD-10-CM

## 2021-09-22 PROCEDURE — 96372 THER/PROPH/DIAG INJ SC/IM: CPT | Performed by: UROLOGY

## 2021-09-22 RX ADMIN — TESTOSTERONE CYPIONATE 400 MG: 200 INJECTION, SOLUTION INTRAMUSCULAR at 16:47

## 2021-09-23 LAB
BASOPHILS # BLD AUTO: 0.07 10*3/MM3 (ref 0–0.2)
BASOPHILS NFR BLD AUTO: 0.7 % (ref 0–1.5)
EOSINOPHIL # BLD AUTO: 0.25 10*3/MM3 (ref 0–0.4)
EOSINOPHIL NFR BLD AUTO: 2.5 % (ref 0.3–6.2)
ERYTHROCYTE [DISTWIDTH] IN BLOOD BY AUTOMATED COUNT: 13.8 % (ref 12.3–15.4)
ESTRADIOL SERPL-MCNC: 19.3 PG/ML (ref 7.6–42.6)
HCT VFR BLD AUTO: 47.2 % (ref 37.5–51)
HGB BLD-MCNC: 15.2 G/DL (ref 13–17.7)
IMM GRANULOCYTES # BLD AUTO: 0.03 10*3/MM3 (ref 0–0.05)
IMM GRANULOCYTES NFR BLD AUTO: 0.3 % (ref 0–0.5)
LYMPHOCYTES # BLD AUTO: 3.25 10*3/MM3 (ref 0.7–3.1)
LYMPHOCYTES NFR BLD AUTO: 32.7 % (ref 19.6–45.3)
MCH RBC QN AUTO: 29.8 PG (ref 26.6–33)
MCHC RBC AUTO-ENTMCNC: 32.2 G/DL (ref 31.5–35.7)
MCV RBC AUTO: 92.5 FL (ref 79–97)
MONOCYTES # BLD AUTO: 1.17 10*3/MM3 (ref 0.1–0.9)
MONOCYTES NFR BLD AUTO: 11.8 % (ref 5–12)
NEUTROPHILS # BLD AUTO: 5.17 10*3/MM3 (ref 1.7–7)
NEUTROPHILS NFR BLD AUTO: 52 % (ref 42.7–76)
NRBC BLD AUTO-RTO: 0 /100 WBC (ref 0–0.2)
PLATELET # BLD AUTO: 387 10*3/MM3 (ref 140–450)
RBC # BLD AUTO: 5.1 10*6/MM3 (ref 4.14–5.8)
TESTOST SERPL-MCNC: 209 NG/DL (ref 264–916)
WBC # BLD AUTO: 9.94 10*3/MM3 (ref 3.4–10.8)

## 2021-09-29 ENCOUNTER — OFFICE VISIT (OUTPATIENT)
Dept: UROLOGY | Facility: CLINIC | Age: 66
End: 2021-09-29

## 2021-09-29 VITALS
OXYGEN SATURATION: 96 % | TEMPERATURE: 98.2 F | BODY MASS INDEX: 28.47 KG/M2 | DIASTOLIC BLOOD PRESSURE: 85 MMHG | HEART RATE: 81 BPM | RESPIRATION RATE: 16 BRPM | WEIGHT: 204 LBS | SYSTOLIC BLOOD PRESSURE: 130 MMHG

## 2021-09-29 DIAGNOSIS — N40.1 BENIGN PROSTATIC HYPERPLASIA WITH LOWER URINARY TRACT SYMPTOMS, SYMPTOM DETAILS UNSPECIFIED: ICD-10-CM

## 2021-09-29 DIAGNOSIS — E29.1 HYPOGONADISM IN MALE: Primary | ICD-10-CM

## 2021-09-29 DIAGNOSIS — E29.1 HYPOGONADISM MALE: ICD-10-CM

## 2021-09-29 LAB
BILIRUB BLD-MCNC: NEGATIVE MG/DL
CLARITY, POC: CLEAR
COLOR UR: YELLOW
GLUCOSE UR STRIP-MCNC: NEGATIVE MG/DL
KETONES UR QL: NEGATIVE
LEUKOCYTE EST, POC: NEGATIVE
NITRITE UR-MCNC: NEGATIVE MG/ML
PH UR: 6.5 [PH] (ref 5–8)
PROT UR STRIP-MCNC: NEGATIVE MG/DL
RBC # UR STRIP: ABNORMAL /UL
SP GR UR: 1.01 (ref 1–1.03)
UROBILINOGEN UR QL: NORMAL

## 2021-09-29 PROCEDURE — 81003 URINALYSIS AUTO W/O SCOPE: CPT | Performed by: UROLOGY

## 2021-09-29 PROCEDURE — 99214 OFFICE O/P EST MOD 30 MIN: CPT | Performed by: UROLOGY

## 2021-09-29 RX ORDER — TESTOSTERONE CYPIONATE 200 MG/ML
400 INJECTION, SOLUTION INTRAMUSCULAR
Qty: 4 ML | Refills: 4 | Status: SHIPPED | OUTPATIENT
Start: 2021-09-29 | End: 2021-12-20
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
## 2021-10-07 ENCOUNTER — HOSPITAL ENCOUNTER (OUTPATIENT)
Dept: MRI IMAGING | Facility: HOSPITAL | Age: 66
Discharge: HOME | End: 2021-10-07
Admitting: PHYSICIAN ASSISTANT

## 2021-10-07 DIAGNOSIS — M23.91 INTERNAL DERANGEMENT OF RIGHT KNEE: ICD-10-CM

## 2021-10-07 PROCEDURE — 73721 MRI JNT OF LWR EXTRE W/O DYE: CPT

## 2021-10-11 ENCOUNTER — TELEPHONE (OUTPATIENT)
Dept: CARDIOLOGY | Facility: CLINIC | Age: 66
End: 2021-10-11

## 2021-10-11 ENCOUNTER — OFFICE VISIT (OUTPATIENT)
Dept: ORTHOPEDIC SURGERY | Facility: CLINIC | Age: 66
End: 2021-10-11

## 2021-10-11 VITALS
WEIGHT: 203.93 LBS | SYSTOLIC BLOOD PRESSURE: 123 MMHG | DIASTOLIC BLOOD PRESSURE: 57 MMHG | HEART RATE: 56 BPM | BODY MASS INDEX: 28.55 KG/M2 | HEIGHT: 71 IN

## 2021-10-11 DIAGNOSIS — M17.11 PRIMARY OSTEOARTHRITIS OF RIGHT KNEE: ICD-10-CM

## 2021-10-11 DIAGNOSIS — S83.231D COMPLEX TEAR OF MEDIAL MENISCUS OF RIGHT KNEE AS CURRENT INJURY, SUBSEQUENT ENCOUNTER: Primary | ICD-10-CM

## 2021-10-11 PROCEDURE — 99214 OFFICE O/P EST MOD 30 MIN: CPT | Performed by: PHYSICIAN ASSISTANT
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
## 2021-10-11 NOTE — TELEPHONE ENCOUNTER
Patient is to have right knee surgery with Dr. Doty on 10/26 and is requesting pre-operative cardiac clearance and medication recommendations.       Patient states that he is not currently having any cardiac symptoms at this time.     Please advise.

## 2021-10-12 NOTE — TELEPHONE ENCOUNTER
Patient notified of recommendations per MJS. Patient verbalizes understanding, is agreeable to plan.

## 2021-10-13 ENCOUNTER — CLINICAL SUPPORT (OUTPATIENT)
Dept: UROLOGY | Facility: CLINIC | Age: 66
End: 2021-10-13

## 2021-10-13 DIAGNOSIS — N40.0 BENIGN PROSTATIC HYPERPLASIA, UNSPECIFIED WHETHER LOWER URINARY TRACT SYMPTOMS PRESENT: ICD-10-CM

## 2021-10-13 PROCEDURE — 96372 THER/PROPH/DIAG INJ SC/IM: CPT | Performed by: UROLOGY

## 2021-10-13 RX ADMIN — TESTOSTERONE CYPIONATE 400 MG: 200 INJECTION, SOLUTION INTRAMUSCULAR at 13:34

## 2021-10-13 NOTE — PROGRESS NOTES
400mg Testerone Cypionate Given left hip. Patient supplied. No complications or redness at site. NDC [id number], LOT#[id number], EXP 03/01/2024 juan

## 2021-10-15 ENCOUNTER — TRANSCRIBE ORDERS (OUTPATIENT)
Dept: ADMINISTRATIVE | Facility: HOSPITAL | Age: 66
End: 2021-10-15

## 2021-10-15 DIAGNOSIS — Z11.52 ENCOUNTER FOR SCREENING FOR COVID-19: Primary | ICD-10-CM

## 2021-10-16 ENCOUNTER — APPOINTMENT (OUTPATIENT)
Dept: MRI IMAGING | Facility: HOSPITAL | Age: 66
End: 2021-10-16

## 2021-10-24 ENCOUNTER — LAB (OUTPATIENT)
Dept: PREADMISSION TESTING | Facility: HOSPITAL | Age: 66
End: 2021-10-24

## 2021-10-24 DIAGNOSIS — Z11.52 ENCOUNTER FOR SCREENING FOR COVID-19: ICD-10-CM

## 2021-10-24 LAB — SARS-COV-2 RNA PNL SPEC NAA+PROBE: NOT DETECTED

## 2021-10-24 PROCEDURE — U0004 COV-19 TEST NON-CDC HGH THRU: HCPCS | Performed by: OTOLARYNGOLOGY

## 2021-10-26 ENCOUNTER — OUTSIDE FACILITY SERVICE (OUTPATIENT)
Dept: ORTHOPEDIC SURGERY | Facility: CLINIC | Age: 66
End: 2021-10-26

## 2021-10-26 DIAGNOSIS — Z98.890 S/P RIGHT KNEE ARTHROSCOPY: Primary | ICD-10-CM

## 2021-10-26 PROCEDURE — 29881 ARTHRS KNE SRG MNISECTMY M/L: CPT | Performed by: ORTHOPAEDIC SURGERY

## 2021-10-26 RX ORDER — ONDANSETRON 4 MG/1
4 TABLET, FILM COATED ORAL EVERY 8 HOURS PRN
Qty: 10 TABLET | Refills: 1 | Status: SHIPPED | OUTPATIENT
Start: 2021-10-26 | End: 2023-01-09

## 2021-10-26 RX ORDER — HYDROCODONE BITARTRATE AND ACETAMINOPHEN 5; 325 MG/1; MG/1
1 TABLET ORAL EVERY 6 HOURS PRN
Qty: 12 TABLET | Refills: 0 | Status: SHIPPED | OUTPATIENT
Start: 2021-10-26 | End: 2021-12-20

## 2021-11-01 ENCOUNTER — OFFICE VISIT (OUTPATIENT)
Dept: ORTHOPEDIC SURGERY | Facility: CLINIC | Age: 66
End: 2021-11-01

## 2021-11-01 DIAGNOSIS — Z98.890 S/P RIGHT KNEE ARTHROSCOPY: Primary | ICD-10-CM

## 2021-11-01 DIAGNOSIS — S83.231D COMPLEX TEAR OF MEDIAL MENISCUS OF RIGHT KNEE AS CURRENT INJURY, SUBSEQUENT ENCOUNTER: ICD-10-CM

## 2021-11-01 PROCEDURE — 99024 POSTOP FOLLOW-UP VISIT: CPT | Performed by: ORTHOPAEDIC SURGERY

## 2021-11-01 NOTE — PROGRESS NOTES
Chief Complaint   Patient presents with   • Post-op     6 days S/P right knee arthroscopy 10/26/21           HPI  He is doing well.  He feels better already.      There were no vitals filed for this visit.      Physical Exam:  Knee looks good.  Trace effusion.  Negative Homans' sign.        ICD-10-CM ICD-9-CM   1. S/P right knee arthroscopy  Z98.890 V45.89   2. Complex tear of medial meniscus of right knee as current injury, subsequent encounter  S83.231D V58.89       Orders Placed This Encounter   Procedures   • Ambulatory Referral to Physical Therapy     [facility] near home.  Follow-up in 3 weeks.  He is retired.        Jack Doty M.D., [facility]  Orthopedic Surgeon  Fellowship Trained Sports Medicine  [facility]  Orthopedics and Sports Medicine  [address] [place] [address], [address]  [place] [address]      EMR Dragon/Transcription disclaimer:  Much of this encounter note is an electronic transcription of spoken language to printed text. Electronic transcription of spoken language may permit erroneous, or at times, nonsensical words or phrases to be inadvertently transcribed. Although I have reviewed the note for such errors, some may still exist.  Answers for HPI/ROS submitted by the patient on 10/25/2021  What is the primary reason for your visit?: Other  Please describe your symptoms.: Knee pain  Have you had these symptoms before?: Yes  How long have you been having these symptoms?: Greater than 2 weeks  Please describe any probable cause for these symptoms. : Hurt knee

## 2021-11-22 ENCOUNTER — OFFICE VISIT (OUTPATIENT)
Dept: ORTHOPEDIC SURGERY | Facility: CLINIC | Age: 66
End: 2021-11-22

## 2021-11-22 DIAGNOSIS — Z98.890 S/P RIGHT KNEE ARTHROSCOPY: Primary | ICD-10-CM

## 2021-11-22 PROCEDURE — 99024 POSTOP FOLLOW-UP VISIT: CPT | Performed by: ORTHOPAEDIC SURGERY

## 2021-11-22 NOTE — PROGRESS NOTES
Chief Complaint   Patient presents with   • Post-op     3 week recheck- 4 weeks S/P right knee arthroscopy 10/26/21           HPI  He is doing well.  He is feeling better.  He has 4 more sessions of physical therapy.      There were no vitals filed for this visit.      Physical Exam:  Right knee looks good.  Minimal swelling.  Negative Homans' sign.        ICD-10-CM ICD-9-CM   1. S/P right knee arthroscopy  Z98.890 V45.89       Orders Placed This Encounter   Procedures   • Ambulatory Referral to Physical Therapy       We will finish physical therapy.  Follow-up as needed.  He is doing great.      Jack Doty M.D., FAAOS  Orthopedic Surgeon  Fellowship Trained Sports Medicine  [facility]  Orthopedics and Sports Medicine  [address] [place] [address], [address]  [place] [address]      EMR Dragon/Transcription disclaimer:  Much of this encounter note is an electronic transcription of spoken language to printed text. Electronic transcription of spoken language may permit erroneous, or at times, nonsensical words or phrases to be inadvertently transcribed. Although I have reviewed the note for such errors, some may still exist.

## 2021-12-20 ENCOUNTER — OFFICE VISIT (OUTPATIENT)
Dept: CARDIOLOGY | Facility: CLINIC | Age: 66
End: 2021-12-20

## 2021-12-20 VITALS
BODY MASS INDEX: 28.7 KG/M2 | HEIGHT: 71 IN | WEIGHT: 205 LBS | HEART RATE: 61 BPM | OXYGEN SATURATION: 97 % | DIASTOLIC BLOOD PRESSURE: 64 MMHG | SYSTOLIC BLOOD PRESSURE: 102 MMHG

## 2021-12-20 DIAGNOSIS — I25.41 CORONARY ARTERY ANEURYSM: ICD-10-CM

## 2021-12-20 DIAGNOSIS — I77.810 ASCENDING AORTA DILATION (HCC): ICD-10-CM

## 2021-12-20 DIAGNOSIS — I25.118 CORONARY ARTERY DISEASE OF NATIVE ARTERY OF NATIVE HEART WITH STABLE ANGINA PECTORIS (HCC): Primary | ICD-10-CM

## 2021-12-20 PROCEDURE — 93000 ELECTROCARDIOGRAM COMPLETE: CPT | Performed by: INTERNAL MEDICINE

## 2021-12-20 PROCEDURE — 99214 OFFICE O/P EST MOD 30 MIN: CPT | Performed by: INTERNAL MEDICINE

## 2021-12-20 NOTE — PROGRESS NOTES
[facility]  [address], [address]  [place], [place], [address]    [phone]  [email or web address]           OUTPATIENT CLINIC NOTE    Patient care team:  Patient Care Team:  Jose Delgado MD as PCP - General  Jose Delgado MD as PCP - Family Medicine  Noah Jerez MD as Consulting Physician (Cardiology)      Subjective:   Chief complaint:   Chief Complaint   Patient presents with   • Hypertension   • Hyperlipidemia       HPI:    Deny Cee Jr. is a 66 y.o. male. 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 Coronary aneurysm, Mild nonobstructive CAD  a. Proximal LAD and RCA followed regularly by the [facility]  i. Heart cath 11/5/2019 with proximal to mid LAD aneurysm, negative FFR of 0.88, Pd/Pa of 0.93, mid LAD ectasia, proximal RCA ectasia, mild diffuse disease  ii. Repeat coronary artery CTA 11/30/2021  2. Mild ascending aortic dilatation  a. 4.1 cm by CT scan 11/2021, [facility]  3. Unclear history of congestive heart failure  4. Incidentally noted lung nodules  a. CT scan 11/2021  5. CVA  6. Hypertension  7. Bradycardia  8. Hyperlipidemia  9. Colon cancer  10. GERD  11. Sleep disturbances, negative for sleep apnea, approximately 2018   12. Restless leg syndrome  a. Noted during sleep study, approximately 2018  13. Former tobacco use, quit 2014  14. Electrocution 1970s  15. Colonic mass  a. Partial colectomy, 7 inches removed, lesion was found to be precancerous  16. Right knee surgery 11/2021    The patient presents for follow-up     Patient overall feels well from a cardiac standpoint.  His chest pain syndrome is currently well controlled.  Still has mild chest tightness, centrally located, improving with nitro sublingual which he uses every 2 to 3 weeks.      Denies exertional dyspnea, lower extremity swelling.  Denies palpitations.    Review of Systems:  As noted in the HPI    PFSH:  Patient Active  Problem List   Diagnosis   • BPH (benign prostatic hyperplasia)   • Renal cyst   • Family history of colon cancer   • Colon cancer screening   • Lesion of colon   • History of colon polyps   • RLQ abdominal pain   • Adenomatous polyp of ascending colon   • Heartburn   • Colon adenoma   • Colon neoplasm   • HTN (hypertension)   • Hyperlipidemia   • GERD (gastroesophageal reflux disease)   • S/P right colectomy, laparoscopic   • Leukocytosis, likely reactive   • Acute postoperative pain   • Postoperative ileus, resolved.   • Coronary artery disease of native artery of native heart with stable angina pectoris (HCC)   • Coronary artery aneurysm   • Ascending aorta dilation (HCC)         Current Outpatient Medications:   •  acetaminophen (TYLENOL) 500 MG tablet, Take 500 mg by mouth Every 6 (Six) Hours As Needed for Moderate Pain ., Disp: , Rfl:   •  alfuzosin (UROXATRAL) 10 MG 24 hr tablet, Take 1 tablet by mouth Daily., Disp: 90 tablet, Rfl: 3  •  amLODIPine (NORVASC) 2.5 MG tablet, Take 2.5 mg by mouth Daily., Disp: , Rfl:   •  apixaban (ELIQUIS) 5 MG tablet tablet, Take 1 tablet by mouth Every 12 (Twelve) Hours., Disp: 180 tablet, Rfl: 3  •  baclofen (LIORESAL) 10 MG tablet, Take 10 mg by mouth Daily As Needed for Muscle Spasms., Disp: , Rfl:   •  busPIRone (BUSPAR) 5 MG tablet, Take 5 mg by mouth 2 (Two) Times a Day As Needed (anxiety)., Disp: , Rfl:   •  cetirizine (ZyrTEC Allergy) 10 MG tablet, Take 10 mg by mouth Daily., Disp: , Rfl:   •  clopidogrel (PLAVIX) 75 MG tablet, Take 1 tablet by mouth Daily., Disp: 30 tablet, Rfl:   •  Diclofenac Sodium (VOLTAREN) 1 % gel gel, Apply 4 g topically to the appropriate area as directed 2 (Two) Times a Day., Disp: 150 g, Rfl: 5  •  Emollient (LUBRIDERM LOTION) lotion, Apply 1 application topically to the appropriate area as directed As Needed., Disp: , Rfl:   •  fluticasone (FLONASE) 50 MCG/ACT nasal spray, 2 sprays into the nostril(s) as directed by provider Daily., Disp:  ", Rfl:   •  isosorbide mononitrate (IMDUR) 30 MG 24 hr tablet, Take 30 mg by mouth Daily., Disp: , Rfl:   •  metoprolol tartrate (LOPRESSOR) 50 MG tablet, Take 1 tablet the evening prior to the CTA examination, then take 1 tablet the morning of the CTA examination. (Patient taking differently: Take 25 mg by mouth 2 (Two) Times a Day.), Disp: 2 tablet, Rfl: 0  •  nitroglycerin (NITROSTAT) 0.4 MG SL tablet, Place 0.4 mg under the tongue Every 5 (Five) Minutes As Needed for Chest Pain. Take no more than 3 doses in 15 minutes., Disp: , Rfl:   •  ondansetron (Zofran) 4 MG tablet, Take 1 tablet by mouth Every 8 (Eight) Hours As Needed for Nausea., Disp: 10 tablet, Rfl: 1  •  pantoprazole (Protonix) 40 MG EC tablet, Take 1 tablet by mouth 30 minutes before breakfast daily. (Patient taking differently: Take 40 mg by mouth Daily. Take 1 tablet by mouth 30 minutes before breakfast daily.), Disp: 30 tablet, Rfl: 5  •  rosuvastatin (CRESTOR) 40 MG tablet, Take 40 mg by mouth Daily., Disp: , Rfl:   No current facility-administered medications for this visit.    No Known Allergies    Social History     Socioeconomic History   • Marital status:    Tobacco Use   • Smoking status: Former Smoker     Packs/day: 0.25     Years: 40.00     Pack years: 10.00     Types: Cigarettes     Quit date:      Years since quittin.9   • Smokeless tobacco: Never Used   Vaping Use   • Vaping Use: Never used   Substance and Sexual Activity   • Alcohol use: No   • Drug use: No   • Sexual activity: Defer     Family History   Problem Relation Age of Onset   • Colon cancer Father    • Cirrhosis Neg Hx    • Liver cancer Neg Hx    • Liver disease Neg Hx          Objective:   Physical Exam:  /64 (BP Location: Left arm, Patient Position: Sitting)   Pulse 61   Ht 180.3 cm (71\")   Wt 93 kg (205 lb)   SpO2 97%   BMI 28.59 kg/m²   CONSTITUTIONAL: Well-nourished. In no acute distress.   LUNGS: Normal effort. Clear to auscultation " bilaterally without wheezing, rhonchi, or rales noted.   CARDIOVASCULAR: Regular rate and rhythm with a normal S1 and S2. There is no murmur, gallop, rub, or click appreciated. Carotid upstrokes are 2+ and symmetrical without bruits.     Labs:  BUN   Date Value Ref Range Status   03/08/2021 8 8 - 23 mg/dL Final   11/05/2019 13 9 - 24 mg/dL Final     Creatinine   Date Value Ref Range Status   11/30/2021 0.79 0.73 - 1.22 mg/dL Final     Potassium   Date Value Ref Range Status   03/08/2021 4.1 3.5 - 5.2 mmol/L Final   11/05/2019 4.2 3.7 - 5.1 mmol/L Final     ALT (SGPT)   Date Value Ref Range Status   11/05/2019 15 10 - 54 U/L Final     AST (SGOT)   Date Value Ref Range Status   11/05/2019 18 14 - 40 U/L Final     WBC   Date Value Ref Range Status   09/22/2021 9.94 3.40 - 10.80 10*3/mm3 Final   11/05/2019 8.65 3.70 - 11.00 k/uL Final     Hemoglobin   Date Value Ref Range Status   09/22/2021 15.2 13.0 - 17.7 g/dL Final   03/09/2021 13.7 13.0 - 17.7 g/dL Final   11/05/2019 13.2 13.0 - 17.0 g/dL Final     Hematocrit   Date Value Ref Range Status   09/22/2021 47.2 37.5 - 51.0 % Final   03/09/2021 41.5 37.5 - 51.0 % Final   11/05/2019 41.1 39.0 - 51.0 % Final     Platelets   Date Value Ref Range Status   09/22/2021 387 140 - 450 10*3/mm3 Final   03/09/2021 341 140 - 450 10*3/mm3 Final   11/05/2019 368 150 - 400 k/uL Final       No results found for: CHOL  No results found for: TRIG  No results found for: HDL  No results found for: LDL  No components found for: LDLDIRECTC    Diagnostic Data:      ECG 12 Lead    Date/Time: 12/20/2021 6:47 PM  Performed by: Noah Jerez MD  Authorized by: Noah Jerez MD   Comparison: compared with previous ECG from 12/14/2020  Similar to previous ECG  Rhythm: sinus rhythm            Results for orders placed in visit on 04/01/19    SCANNED - ECHOCARDIOGRAM    Cath Nov 2019  -Aneurysmal dilatation of proximal to mid LAD (with negative FFR 0.88 & Pd/Pa   0.93) with mid LAD ectasia,  otherwise mild diffuse disease   -Proximal RCA ectasia with mild diffuse disease   -Overall, mild non-obstructive CAD     Echo Nov 2019  - Exam indication: Initial evaluation valvular heart disease   - The left ventricle is normal in size. There is mild concentric left ventricular   hypertrophy. Left ventricular systolic function is normal. EF = 70 ± 5% (2D   biplane)   - The right ventricle is normal in size. Right ventricular systolic function is   normal.   - The left atrial cavity is mildly dilated.   - The visualized aorta is borderline dilated with a maximal dimension of 4.0 cm.   - There is mild to moderate mitral regurgitation.   - The patient has not had a prior CC echocardiographic exam for comparison.     Coronary CTA 11/2021, [facility]  1.  Normal coronary origins and course with aneurysmal changes of the   proximal to mid LAD and ectatic proximal RCA as detailed above, without   evidence of significant atherosclerotic changes or stenotic disease.     2. The aortic root is mildly ectatic measuring 4.1 cm.  The remaining   visualized thoracic aorta is normal in caliber, course and contour.     There is no acute aortic pathology.       Assessment and Plan:     Coronary artery disease of native artery of native heart with stable angina pectoris   Coronary ectasia/aneurysm  Mild dilatation of the ascending aorta  -Continue regular follow-up with the [facility] every 6 months who are performing interval CT scans  -Previously advised if he needs CABG, would have it done at the [facility]    -Long-term, agree with continuing clopidogrel and apixaban  -Not on aspirin to avoid triple therapy  -Antianginal therapy with beta-blocker, calcium channel blocker, isosorbide mononitrate  -Continue high intensity statin  -Aggressive lifestyle risk factor modification for CAD.  -30lb lifting restriction per [facility]      - Return in about 1 year (around 12/20/2022) for Next scheduled follow-up  with an ECG.    Noah Jerez MD, MSc, FACC, [facility]  Interventional Cardiology  [facility]

## 2022-03-10 DIAGNOSIS — N13.8 BPH WITH URINARY OBSTRUCTION: ICD-10-CM

## 2022-03-10 DIAGNOSIS — N40.1 BPH WITH URINARY OBSTRUCTION: ICD-10-CM

## 2022-03-10 RX ORDER — ALFUZOSIN HYDROCHLORIDE 10 MG/1
10 TABLET, EXTENDED RELEASE ORAL DAILY
Qty: 90 TABLET | Refills: 0 | Status: SHIPPED | OUTPATIENT
Start: 2022-03-10 | End: 2022-07-01 | Stop reason: SDUPTHER

## 2022-03-31 ENCOUNTER — OFFICE VISIT (OUTPATIENT)
Dept: UROLOGY | Facility: CLINIC | Age: 67
End: 2022-03-31

## 2022-03-31 VITALS
TEMPERATURE: 96.9 F | BODY MASS INDEX: 28.7 KG/M2 | WEIGHT: 205 LBS | DIASTOLIC BLOOD PRESSURE: 80 MMHG | SYSTOLIC BLOOD PRESSURE: 112 MMHG | HEART RATE: 88 BPM | HEIGHT: 71 IN

## 2022-03-31 DIAGNOSIS — R39.9 LOWER URINARY TRACT SYMPTOMS (LUTS): Primary | ICD-10-CM

## 2022-03-31 PROCEDURE — 99214 OFFICE O/P EST MOD 30 MIN: CPT | Performed by: UROLOGY
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
## 2022-05-23 ENCOUNTER — PROCEDURE VISIT (OUTPATIENT)
Dept: UROLOGY | Facility: CLINIC | Age: 67
End: 2022-05-23

## 2022-05-23 DIAGNOSIS — R68.82 LOW LIBIDO: ICD-10-CM

## 2022-05-23 DIAGNOSIS — E29.1 HYPOGONADISM IN MALE: ICD-10-CM

## 2022-05-23 DIAGNOSIS — N52.9 ERECTILE DYSFUNCTION, UNSPECIFIED ERECTILE DYSFUNCTION TYPE: ICD-10-CM

## 2022-05-23 DIAGNOSIS — R39.9 LOWER URINARY TRACT SYMPTOMS (LUTS): Primary | ICD-10-CM

## 2022-05-23 PROCEDURE — 99213 OFFICE O/P EST LOW 20 MIN: CPT | Performed by: UROLOGY

## 2022-05-23 PROCEDURE — 99024 POSTOP FOLLOW-UP VISIT: CPT | Performed by: UROLOGY

## 2022-05-23 PROCEDURE — 52000 CYSTOURETHROSCOPY: CPT | Performed by: UROLOGY

## 2022-05-23 PROCEDURE — 76872 US TRANSRECTAL: CPT | Performed by: UROLOGY
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
## 2022-07-01 DIAGNOSIS — N13.8 BPH WITH URINARY OBSTRUCTION: ICD-10-CM

## 2022-07-01 DIAGNOSIS — N40.1 BPH WITH URINARY OBSTRUCTION: ICD-10-CM

## 2022-07-01 RX ORDER — ALFUZOSIN HYDROCHLORIDE 10 MG/1
10 TABLET, EXTENDED RELEASE ORAL DAILY
Qty: 90 TABLET | Refills: 3 | Status: SHIPPED | OUTPATIENT
Start: 2022-07-01 | End: 2023-07-01

## 2022-08-25 ENCOUNTER — OFFICE VISIT (OUTPATIENT)
Dept: UROLOGY | Facility: CLINIC | Age: 67
End: 2022-08-25

## 2022-08-25 DIAGNOSIS — R39.9 LOWER URINARY TRACT SYMPTOMS (LUTS): Primary | ICD-10-CM

## 2022-08-25 PROCEDURE — 99442 PR PHYS/QHP TELEPHONE EVALUATION 11-20 MIN: CPT | Performed by: UROLOGY

## 2022-08-25 NOTE — PROGRESS NOTES
11 minute telephone conversation with the patient.  He currently has had a number of cardiac investigations, and is planning to have aneurysm surgery.  He would like to put his lower urinary tract symptoms on hold and will follow-up with us in a year with my PA Lemuel.  He is overall fairly stable on his alpha Zosyn and has nocturia x2.    You have chosen to receive care through a telephone visit. Do you consent to use a telephone visit for your medical care today? Yes

## 2023-01-09 ENCOUNTER — OFFICE VISIT (OUTPATIENT)
Dept: CARDIOLOGY | Facility: CLINIC | Age: 68
End: 2023-01-09
Payer: MEDICARE

## 2023-01-09 VITALS
DIASTOLIC BLOOD PRESSURE: 70 MMHG | OXYGEN SATURATION: 96 % | HEIGHT: 71 IN | HEART RATE: 58 BPM | SYSTOLIC BLOOD PRESSURE: 108 MMHG | BODY MASS INDEX: 29.34 KG/M2 | WEIGHT: 209.6 LBS

## 2023-01-09 DIAGNOSIS — I77.810 ASCENDING AORTA DILATION: ICD-10-CM

## 2023-01-09 DIAGNOSIS — I25.41 CORONARY ARTERY ANEURYSM: ICD-10-CM

## 2023-01-09 DIAGNOSIS — E78.2 MIXED HYPERLIPIDEMIA: ICD-10-CM

## 2023-01-09 DIAGNOSIS — I25.118 CORONARY ARTERY DISEASE OF NATIVE ARTERY OF NATIVE HEART WITH STABLE ANGINA PECTORIS: Primary | ICD-10-CM

## 2023-01-09 PROCEDURE — 99214 OFFICE O/P EST MOD 30 MIN: CPT | Performed by: INTERNAL MEDICINE

## 2023-01-09 PROCEDURE — 93000 ELECTROCARDIOGRAM COMPLETE: CPT | Performed by: INTERNAL MEDICINE

## 2023-01-09 RX ORDER — ASPIRIN 81 MG/1
81 TABLET, CHEWABLE ORAL DAILY
COMMUNITY
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
## 2024-03-04 ENCOUNTER — OFFICE VISIT (OUTPATIENT)
Dept: CARDIOLOGY | Facility: CLINIC | Age: 69
End: 2024-03-04
Payer: MEDICARE

## 2024-03-04 VITALS
BODY MASS INDEX: 28.95 KG/M2 | SYSTOLIC BLOOD PRESSURE: 112 MMHG | HEIGHT: 71 IN | DIASTOLIC BLOOD PRESSURE: 64 MMHG | OXYGEN SATURATION: 96 % | WEIGHT: 206.8 LBS | HEART RATE: 58 BPM

## 2024-03-04 DIAGNOSIS — E78.2 MIXED HYPERLIPIDEMIA: ICD-10-CM

## 2024-03-04 DIAGNOSIS — I25.118 CORONARY ARTERY DISEASE OF NATIVE ARTERY OF NATIVE HEART WITH STABLE ANGINA PECTORIS: Primary | ICD-10-CM

## 2024-03-04 DIAGNOSIS — I10 PRIMARY HYPERTENSION: ICD-10-CM

## 2024-03-04 DIAGNOSIS — I77.810 ASCENDING AORTA DILATION: ICD-10-CM

## 2024-03-04 DIAGNOSIS — I25.41 CORONARY ARTERY ANEURYSM: ICD-10-CM

## 2024-03-04 PROCEDURE — 3074F SYST BP LT 130 MM HG: CPT | Performed by: HOSPITALIST

## 2024-03-04 PROCEDURE — 1160F RVW MEDS BY RX/DR IN RCRD: CPT | Performed by: HOSPITALIST

## 2024-03-04 PROCEDURE — 3078F DIAST BP <80 MM HG: CPT | Performed by: HOSPITALIST

## 2024-03-04 PROCEDURE — 99214 OFFICE O/P EST MOD 30 MIN: CPT | Performed by: HOSPITALIST

## 2024-03-04 PROCEDURE — 1159F MED LIST DOCD IN RCRD: CPT | Performed by: HOSPITALIST

## 2024-03-04 PROCEDURE — 93000 ELECTROCARDIOGRAM COMPLETE: CPT | Performed by: INTERNAL MEDICINE

## 2024-03-04 NOTE — PROGRESS NOTES
[facility] Cardiology  [address], [address]  [place], [place], [address]    [phone]  WWW.[facility] HospitalMyKontiki ([employer])[facility]           OUTPATIENT CLINIC NOTE    Patient care team:  Patient Care Team:  Jose Delgado MD as PCP - General  Jose Delgado MD as PCP - Family Medicine  Noah Jerez MD as Consulting Physician (Cardiology)      Subjective:   Chief complaint:   Chief Complaint   Patient presents with    Coronary Artery Disease       HPI:    Deny Cee Jr. is a 68 y.o. male. Powerline worker, farmer    Partial problem list, including cardiac problems:  Coronary aneurysm, microvascular dysfunction, mild nonobstructive CAD  Proximal LAD and RCA followed regularly by the [facility]  Heart cath 11/5/2019 with proximal to mid LAD aneurysm, negative FFR of 0.88, Pd/Pa of 0.93, mid LAD ectasia, proximal RCA ectasia, mild diffuse disease  Repeat coronary artery CTA 11/30/2021  Coronary CTA 7/18/2022:  Stable ectasia of proximal to mid LAD (maximal diameter 8 mm) and stable ectasia of the proximal RCA (7mm).   LHC 7/29/2022:  Stable proximal-mid LAD and proximal RCA ectasia. Non-obstructive mild diffuse disease present otherwise.   C 9/2022: LAD aneurysm with normal FFR, normal CFR and abnormal IMR consistent with microvascular dysfunction.   Mild ascending aortic dilatation  4.1 cm by CT scan 11/2021, [facility]  4.0 cm by coronary CTA 7/2022, [facility]   Unclear history of congestive heart failure  Incidentally noted lung nodules  CT scan 11/2021  CVA  Hypertension  Bradycardia  Hyperlipidemia  Colon cancer  GERD  Sleep disturbances, negative for sleep apnea, approximately 2018   Restless leg syndrome  Noted during sleep study, approximately 2018  Former tobacco use, quit 2014  Electrocution 1970s  Colonic mass  Partial colectomy, 7 inches removed, lesion was found to be precancerous  Right knee surgery 11/2021    The patient presents for follow-up.  Stable  chest pains since last visit.  Maybe a little more frequent in the last 2 weeks. Taking nitroglycerin 3-4 times a week which relieves his chest pain.   Denies shortness of breath, palpitations, lower extremity edema, lightheadedness or syncope.     Review of Systems:  As noted in the HPI    PFSH:  Patient Active Problem List   Diagnosis    BPH (benign prostatic hyperplasia)    Renal cyst    Family history of colon cancer    Colon cancer screening    Lesion of colon    History of colon polyps    RLQ abdominal pain    Adenomatous polyp of ascending colon    Heartburn    Colon adenoma    Colon neoplasm    HTN (hypertension)    Hyperlipidemia    GERD (gastroesophageal reflux disease)    S/P right colectomy, laparoscopic    Leukocytosis, likely reactive    Acute postoperative pain    Postoperative ileus, resolved.    Coronary artery disease of native artery of native heart with stable angina pectoris    Coronary artery aneurysm    Ascending aorta dilation         Current Outpatient Medications:     acetaminophen (TYLENOL) 500 MG tablet, Take 1 tablet by mouth Daily As Needed., Disp: 90 tablet, Rfl: 0    amLODIPine (NORVASC) 2.5 MG tablet, Take 1 tablet by mouth Daily., Disp: , Rfl:     apixaban (ELIQUIS) 5 MG tablet tablet, Take 1 tablet by mouth Every 12 (Twelve) Hours., Disp: 180 tablet, Rfl: 3    aspirin 81 MG chewable tablet, Chew 1 tablet Daily., Disp: , Rfl:     baclofen (LIORESAL) 10 MG tablet, Take 1 tablet by mouth Daily As Needed for Muscle Spasms., Disp: , Rfl:     busPIRone (BUSPAR) 5 MG tablet, Take 1 tablet by mouth 2 (Two) Times a Day As Needed (anxiety)., Disp: , Rfl:     cetirizine (zyrTEC) 10 MG tablet, Take 1 tablet by mouth Daily., Disp: , Rfl:     Emollient (LUBRIDERM LOTION) lotion, Apply 1 application topically to the appropriate area as directed As Needed., Disp: , Rfl:     fluticasone (FLONASE) 50 MCG/ACT nasal spray, 2 sprays into the nostril(s) as directed by provider Daily., Disp: , Rfl:      "isosorbide mononitrate (IMDUR) 60 MG 24 hr tablet, Take 1 tablet by mouth Daily., Disp: 90 tablet, Rfl: 3    metoprolol tartrate (LOPRESSOR) 50 MG tablet, Take 1 tablet the evening prior to the CTA examination, then take 1 tablet the morning of the CTA examination. (Patient taking differently: Take 0.5 tablets by mouth 2 (Two) Times a Day.), Disp: 2 tablet, Rfl: 0    nitroglycerin (NITROSTAT) 0.4 MG SL tablet, Place 1 tablet under the tongue Every 5 (Five) Minutes As Needed for Chest Pain. Take no more than 3 doses in 15 minutes., Disp: , Rfl:     pantoprazole (Protonix) 40 MG EC tablet, Take 1 tablet by mouth 30 minutes before breakfast daily. (Patient taking differently: Take 1 tablet by mouth Daily. Take 1 tablet by mouth 30 minutes before breakfast daily.), Disp: 30 tablet, Rfl: 5    rosuvastatin (CRESTOR) 40 MG tablet, Take 1 tablet by mouth Daily., Disp: , Rfl:     No Known Allergies    Social History     Socioeconomic History    Marital status:    Tobacco Use    Smoking status: Former     Current packs/day: 0.00     Average packs/day: 0.3 packs/day for 40.0 years (10.0 ttl pk-yrs)     Types: Cigarettes     Start date: 1/1/1974     Quit date: 1/1/2014     Years since quitting: 10.1    Smokeless tobacco: Never   Vaping Use    Vaping status: Never Used   Substance and Sexual Activity    Alcohol use: No    Drug use: No    Sexual activity: Defer         Objective:   Physical Exam:  /64 (BP Location: Right arm, Patient Position: Sitting)   Pulse 58   Ht 180.3 cm (71\")   Wt 93.8 kg (206 lb 12.8 oz)   SpO2 96%   BMI 28.84 kg/m²   CONSTITUTIONAL: No acute distress  CARDIOVASCULAR: Regular rate and rhythm with normal S1 and S2. Without murmur.  PERIPHERAL VASCULAR: No carotid bruit bilaterally.  Normal radial pulse.    Labs:    No results found for: \"CHOL\"  No results found for: \"TRIG\"  No results found for: \"HDL\"  No results found for: \"LDL\"  No components found for: \"LDLDIRECTC\"    Diagnostic Data:  "     ECG 12 Lead    Date/Time: 3/4/2024 3:45 PM  Performed by: Noah Jerez MD    Authorized by: Noah Jerez MD  Comparison: compared with previous ECG from 1/9/2023  Similar to previous ECG  Rhythm: sinus bradycardia  Rate: bradycardic  BPM: 58  Conduction: conduction normal            [facility], 11/2019  -Aneurysmal dilatation of proximal to mid LAD (with negative FFR 0.88 & Pd/Pa   0.93) with mid LAD ectasia, otherwise mild diffuse disease   -Proximal RCA ectasia with mild diffuse disease   -Overall, mild non-obstructive CAD       Coronary CTA 11/2021, [facility]  1.  Normal coronary origins and course with aneurysmal changes of the   proximal to mid LAD and ectatic proximal RCA as detailed above, without   evidence of significant atherosclerotic changes or stenotic disease.     2. The aortic root is mildly ectatic measuring 4.1 cm.  The remaining   visualized thoracic aorta is normal in caliber, course and contour.     There is no acute aortic pathology.     Coronary CTA 7/18/2022, [facility]  Normal coronary artery origins and courses with stable ectasia of the proximal to mid LAD (maximal diameter 8 mm) and stable ectasia of the proximal RCA (7 mm).   No significant luminal stenosis.     [facility] 7/29/2022, [facility]  Right dominant system  Stable proximal-mid LAD and proximal RCA ectasia.   Non-obstructive mild diffuse disease present otherwise.     [facility] 9/2022 [facility]: LAD aneurysm with normal FFR, normal CFR and abnormal IMR consistent with microvascular dysfunction.       Echo Nov 2019  - Exam indication: Initial evaluation valvular heart disease   - The left ventricle is normal in size. There is mild concentric left ventricular   hypertrophy. Left ventricular systolic function is normal. EF = 70 ± 5% (2D   biplane)   - The right ventricle is normal in size. Right ventricular systolic function is   normal.   - The left atrial cavity is mildly dilated.   - The visualized aorta is  borderline dilated with a maximal dimension of 4.0 cm.   - There is mild to moderate mitral regurgitation.   - The patient has not had a prior CC echocardiographic exam for comparison.     TTE 7/18/2022  The left ventricle is normal in size. Left ventricular systolic function is normal. EF 63% +/- 5%.   Grade I left ventricular diastolic dysfunction.   The right ventricle is normal in size. Right ventricular systolic function is normal.  The left atrial cavity is mildly dilated.   The visualized aorta is borderline dilated with a maximal dimension of 4.0 cm.      Assessment and Plan:     Coronary artery disease of native artery of native heart  Microvascular dysfunction  Coronary ectasia/aneurysm  Mild dilatation of the ascending aorta  -Followed regularly at the [facility]  -Continues to have anginal-like symptoms, possibly a little worse in the last couple weeks.  Also with headaches recently, possibly from Imdur.   -Discussed the option of adding Ranexa.  Patient would like to hold off on medication changes for now.  Has follow up with [facility] in July.   -Continue current medical therapy including aspirin, apixaban, Lopressor, Imdur, statin  -Will request images from most recent heart cath at [facility] for review.   -Heart healthy diet/lifestyle modifications, exercise limitations per [facility]    - Return in about 1 year (around 3/4/2025) for Next scheduled follow up with EKG .      Scribed for JUANIS Laureano by JUANIS Garcia. 3/4/2024  15:46 EST  I, Noah Jerez MD, personally performed the services as scribed by the above named individual. I have made any necessary edits and it is both accurate and complete.     Noah Jerez MD, MSc, FACC, [facility]  Interventional Cardiology  [facility]

## 2024-03-05 ENCOUNTER — HOSPITAL ENCOUNTER (OUTPATIENT)
Dept: CARDIOLOGY | Facility: HOSPITAL | Age: 69
Discharge: HOME | End: 2024-03-05

## 2024-03-05 DIAGNOSIS — Z00.6 ENCOUNTER FOR EXAMINATION FOR NORMAL COMPARISON AND CONTROL IN CLINICAL RESEARCH PROGRAM: ICD-10-CM

## 2024-06-18 ENCOUNTER — OFFICE VISIT (OUTPATIENT)
Age: 69
End: 2024-06-18
Payer: MEDICARE

## 2024-06-18 VITALS
HEIGHT: 71 IN | DIASTOLIC BLOOD PRESSURE: 62 MMHG | BODY MASS INDEX: 27.72 KG/M2 | SYSTOLIC BLOOD PRESSURE: 90 MMHG | WEIGHT: 198 LBS

## 2024-06-18 DIAGNOSIS — M75.51 BURSITIS OF RIGHT SHOULDER: ICD-10-CM

## 2024-06-18 DIAGNOSIS — S49.91XA RIGHT SHOULDER INJURY, INITIAL ENCOUNTER: ICD-10-CM

## 2024-06-18 DIAGNOSIS — M25.511 RIGHT SHOULDER PAIN, UNSPECIFIED CHRONICITY: Primary | ICD-10-CM

## 2024-06-18 DIAGNOSIS — M75.111 NONTRAUMATIC INCOMPLETE TEAR OF RIGHT ROTATOR CUFF: ICD-10-CM
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
## 2024-06-20 ENCOUNTER — HOSPITAL ENCOUNTER (OUTPATIENT)
Dept: MRI IMAGING | Facility: HOSPITAL | Age: 69
Discharge: HOME | End: 2024-06-20
Payer: MEDICARE

## 2024-06-20 DIAGNOSIS — M25.511 RIGHT SHOULDER PAIN, UNSPECIFIED CHRONICITY: ICD-10-CM

## 2024-06-20 DIAGNOSIS — M75.111 NONTRAUMATIC INCOMPLETE TEAR OF RIGHT ROTATOR CUFF: ICD-10-CM

## 2024-06-20 DIAGNOSIS — S49.91XA RIGHT SHOULDER INJURY, INITIAL ENCOUNTER: ICD-10-CM

## 2024-06-20 PROCEDURE — 73221 MRI JOINT UPR EXTREM W/O DYE: CPT

## 2024-06-28 ENCOUNTER — PREP FOR SURGERY (OUTPATIENT)
Dept: OTHER | Facility: HOSPITAL | Age: 69
End: 2024-06-28
Payer: MEDICARE

## 2024-06-28 ENCOUNTER — DOCUMENTATION (OUTPATIENT)
Dept: ORTHOPEDIC SURGERY | Facility: CLINIC | Age: 69
End: 2024-06-28
Payer: MEDICARE

## 2024-06-28 ENCOUNTER — OFFICE VISIT (OUTPATIENT)
Dept: ORTHOPEDIC SURGERY | Facility: CLINIC | Age: 69
End: 2024-06-28
Payer: MEDICARE

## 2024-06-28 VITALS
SYSTOLIC BLOOD PRESSURE: 120 MMHG | HEIGHT: 71 IN | WEIGHT: 198 LBS | BODY MASS INDEX: 27.72 KG/M2 | DIASTOLIC BLOOD PRESSURE: 66 MMHG

## 2024-06-28 DIAGNOSIS — M75.51 BURSITIS OF RIGHT SHOULDER: ICD-10-CM

## 2024-06-28 DIAGNOSIS — M25.511 RIGHT SHOULDER PAIN, UNSPECIFIED CHRONICITY: ICD-10-CM

## 2024-06-28 DIAGNOSIS — M75.21 BICEPS TENDINITIS ON RIGHT: ICD-10-CM

## 2024-06-28 DIAGNOSIS — M75.121 COMPLETE TEAR OF RIGHT ROTATOR CUFF, UNSPECIFIED WHETHER TRAUMATIC: Primary | ICD-10-CM

## 2024-06-28 DIAGNOSIS — S49.91XA RIGHT SHOULDER INJURY, INITIAL ENCOUNTER: ICD-10-CM

## 2024-06-28 RX ORDER — FAMOTIDINE 20 MG/1
20 TABLET, FILM COATED ORAL
COMMUNITY
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
## 2024-08-05 ENCOUNTER — DOCUMENTATION (OUTPATIENT)
Dept: ORTHOPEDIC SURGERY | Facility: CLINIC | Age: 69
End: 2024-08-05

## 2024-08-05 DIAGNOSIS — Z98.890 STATUS POST RIGHT ROTATOR CUFF REPAIR: Primary | ICD-10-CM

## 2024-08-05 DIAGNOSIS — M75.121 COMPLETE TEAR OF RIGHT ROTATOR CUFF, UNSPECIFIED WHETHER TRAUMATIC: ICD-10-CM

## 2024-08-05 RX ORDER — ONDANSETRON 4 MG/1
4 TABLET, FILM COATED ORAL EVERY 6 HOURS PRN
Qty: 30 TABLET | Refills: 1 | Status: SHIPPED | OUTPATIENT
Start: 2024-08-05 | End: 2024-08-15

## 2024-08-05 RX ORDER — HYDROCODONE BITARTRATE AND ACETAMINOPHEN 10; 325 MG/1; MG/1
1 TABLET ORAL EVERY 6 HOURS PRN
Qty: 28 TABLET | Refills: 0 | Status: SHIPPED | OUTPATIENT
Start: 2024-08-05 | End: 2024-08-12
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
## 2024-08-20 ENCOUNTER — OFFICE VISIT (OUTPATIENT)
Age: 69
End: 2024-08-20
Payer: MEDICARE

## 2024-08-20 VITALS — TEMPERATURE: 97.8 F

## 2024-08-20 DIAGNOSIS — Z98.890 STATUS POST ARTHROSCOPY OF RIGHT SHOULDER: Primary | ICD-10-CM

## 2024-08-20 PROCEDURE — 99024 POSTOP FOLLOW-UP VISIT: CPT
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
## 2024-08-22 ENCOUNTER — TELEPHONE (OUTPATIENT)
Dept: ORTHOPEDIC SURGERY | Facility: CLINIC | Age: 69
End: 2024-08-22

## 2024-10-29 ENCOUNTER — OFFICE VISIT (OUTPATIENT)
Age: 69
End: 2024-10-29
Payer: MEDICARE

## 2024-10-29 DIAGNOSIS — Z98.890 STATUS POST ARTHROSCOPY OF RIGHT SHOULDER: Primary | ICD-10-CM
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
## 2025-01-14 ENCOUNTER — OFFICE VISIT (OUTPATIENT)
Age: 70
End: 2025-01-14
Payer: MEDICARE

## 2025-01-14 VITALS
WEIGHT: 203 LBS | HEIGHT: 71 IN | SYSTOLIC BLOOD PRESSURE: 108 MMHG | DIASTOLIC BLOOD PRESSURE: 62 MMHG | BODY MASS INDEX: 28.42 KG/M2

## 2025-01-14 DIAGNOSIS — M75.121 COMPLETE TEAR OF RIGHT ROTATOR CUFF, UNSPECIFIED WHETHER TRAUMATIC: ICD-10-CM

## 2025-01-14 DIAGNOSIS — Z98.890 STATUS POST ARTHROSCOPY OF RIGHT SHOULDER: Primary | ICD-10-CM

## 2025-01-14 PROCEDURE — 3078F DIAST BP <80 MM HG: CPT

## 2025-01-14 PROCEDURE — 3074F SYST BP LT 130 MM HG: CPT

## 2025-01-14 PROCEDURE — 99213 OFFICE O/P EST LOW 20 MIN: CPT
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
## 2025-03-13 ENCOUNTER — OFFICE VISIT (OUTPATIENT)
Age: 70
End: 2025-03-13
Payer: MEDICARE

## 2025-03-13 VITALS
TEMPERATURE: 98.6 F | DIASTOLIC BLOOD PRESSURE: 77 MMHG | HEART RATE: 55 BPM | OXYGEN SATURATION: 98 % | BODY MASS INDEX: 27.86 KG/M2 | HEIGHT: 71 IN | WEIGHT: 199 LBS | SYSTOLIC BLOOD PRESSURE: 120 MMHG

## 2025-03-13 DIAGNOSIS — I25.118 CORONARY ARTERY DISEASE OF NATIVE ARTERY OF NATIVE HEART WITH STABLE ANGINA PECTORIS: ICD-10-CM

## 2025-03-13 DIAGNOSIS — Z12.5 PROSTATE CANCER SCREENING: Primary | ICD-10-CM

## 2025-03-13 DIAGNOSIS — E78.2 MIXED HYPERLIPIDEMIA: ICD-10-CM
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
## 2025-03-17 ENCOUNTER — PATIENT ROUNDING (BHMG ONLY) (OUTPATIENT)
Age: 70
End: 2025-03-17
Payer: MEDICARE

## 2025-03-17 NOTE — PROGRESS NOTES
A [employer] message has been sent to the patient for PATIENT ROUNDING with [facility].

## 2025-05-13 ENCOUNTER — OFFICE VISIT (OUTPATIENT)
Age: 70
End: 2025-05-13
Payer: MEDICARE

## 2025-05-13 VITALS
WEIGHT: 202 LBS | DIASTOLIC BLOOD PRESSURE: 70 MMHG | SYSTOLIC BLOOD PRESSURE: 128 MMHG | HEIGHT: 71 IN | BODY MASS INDEX: 28.28 KG/M2

## 2025-05-13 DIAGNOSIS — M25.511 CHRONIC RIGHT SHOULDER PAIN: ICD-10-CM

## 2025-05-13 DIAGNOSIS — G89.29 CHRONIC RIGHT SHOULDER PAIN: ICD-10-CM

## 2025-05-13 DIAGNOSIS — Z98.890 STATUS POST ARTHROSCOPY OF RIGHT SHOULDER: Primary | ICD-10-CM

## 2025-05-13 RX ORDER — METOPROLOL TARTRATE 25 MG/1
25 TABLET, FILM COATED ORAL 2 TIMES DAILY
COMMUNITY
Start: 2025-03-04

## 2025-05-13 RX ADMIN — TRIAMCINOLONE ACETONIDE 40 MG: 40 INJECTION, SUSPENSION INTRA-ARTICULAR; INTRAMUSCULAR at 15:24

## 2025-05-13 RX ADMIN — LIDOCAINE HYDROCHLORIDE 5 ML: 10 INJECTION, SOLUTION EPIDURAL; INFILTRATION; INTRACAUDAL; PERINEURAL at 15:24

## 2025-05-13 NOTE — PROGRESS NOTES
Procedure   - Large Joint Arthrocentesis: R subacromial bursa on 5/13/2025 3:24 PM  Indications: pain  Details: 21 G needle, posterior approach  Medications: 5 mL lidocaine PF 1% 1 %; 40 mg triamcinolone acetonide 40 MG/ML  Outcome: tolerated well, no immediate complications  Procedure, treatment alternatives, risks and benefits explained, specific risks discussed. Consent was given by the patient. Immediately prior to procedure a time out was called to verify the correct patient, procedure, equipment, support staff and site/side marked as required. Patient was prepped and draped in the usual sterile fashion.
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
## 2025-05-15 RX ORDER — TRIAMCINOLONE ACETONIDE 40 MG/ML
40 INJECTION, SUSPENSION INTRA-ARTICULAR; INTRAMUSCULAR
Status: COMPLETED | OUTPATIENT
Start: 2025-05-13 | End: 2025-05-13

## 2025-05-15 RX ORDER — LIDOCAINE HYDROCHLORIDE 10 MG/ML
5 INJECTION, SOLUTION EPIDURAL; INFILTRATION; INTRACAUDAL; PERINEURAL
Status: COMPLETED | OUTPATIENT
Start: 2025-05-13 | End: 2025-05-13

## 2025-05-19 ENCOUNTER — OFFICE VISIT (OUTPATIENT)
Dept: CARDIOLOGY | Facility: CLINIC | Age: 70
End: 2025-05-19
Payer: MEDICARE

## 2025-05-19 VITALS
DIASTOLIC BLOOD PRESSURE: 60 MMHG | BODY MASS INDEX: 28 KG/M2 | SYSTOLIC BLOOD PRESSURE: 94 MMHG | HEART RATE: 50 BPM | OXYGEN SATURATION: 98 % | HEIGHT: 71 IN | WEIGHT: 200 LBS

## 2025-05-19 DIAGNOSIS — I25.41 CORONARY ARTERY ANEURYSM: ICD-10-CM

## 2025-05-19 DIAGNOSIS — I77.810 ASCENDING AORTA DILATION: ICD-10-CM

## 2025-05-19 DIAGNOSIS — I10 PRIMARY HYPERTENSION: ICD-10-CM

## 2025-05-19 DIAGNOSIS — I25.118 CORONARY ARTERY DISEASE OF NATIVE ARTERY OF NATIVE HEART WITH STABLE ANGINA PECTORIS: Primary | ICD-10-CM

## 2025-05-19 DIAGNOSIS — E78.2 MIXED HYPERLIPIDEMIA: ICD-10-CM

## 2025-05-19 PROCEDURE — 99214 OFFICE O/P EST MOD 30 MIN: CPT | Performed by: HOSPITALIST

## 2025-05-19 PROCEDURE — 1159F MED LIST DOCD IN RCRD: CPT | Performed by: HOSPITALIST

## 2025-05-19 PROCEDURE — 3074F SYST BP LT 130 MM HG: CPT | Performed by: HOSPITALIST

## 2025-05-19 PROCEDURE — 1160F RVW MEDS BY RX/DR IN RCRD: CPT | Performed by: HOSPITALIST

## 2025-05-19 PROCEDURE — 93000 ELECTROCARDIOGRAM COMPLETE: CPT | Performed by: HOSPITALIST

## 2025-05-19 PROCEDURE — 3078F DIAST BP <80 MM HG: CPT | Performed by: HOSPITALIST

## 2025-05-19 NOTE — PROGRESS NOTES
[facility]  [address], [address]  [place], [place], [address]    [phone]  [email or web address]           OUTPATIENT CLINIC NOTE    Patient care team:  Patient Care Team:  Butch Ruvalcaba DO as PCP - General (Family Medicine)  Jose Delgado MD as PCP - Family Medicine  Noah Jerez MD as Consulting Physician (Cardiology)  Fish Toribio MD as Consulting Physician (Orthopedic Surgery)  Lashon Joe PA-C as Physician Assistant (Physician Assistant)      Subjective:   Chief complaint:   Chief Complaint   Patient presents with    Coronary Artery Disease     Coronary artery disease of native artery of native heart with stable angina pectoris       HPI:    Deny Cee Jr. is a 69 y.o. male. [employer] worker, farmer    Partial problem list, including cardiac problems:  Coronary aneurysm, microvascular dysfunction, mild nonobstructive CAD  Proximal LAD and RCA followed regularly by the [facility]  Heart cath 11/5/2019 with proximal to mid LAD aneurysm, negative FFR of 0.88, Pd/Pa of 0.93, mid LAD ectasia, proximal RCA ectasia, mild diffuse disease  Repeat coronary artery CTA 11/30/2021  Coronary CTA 7/18/2022:  Stable ectasia of proximal to mid LAD (maximal diameter 8 mm) and stable ectasia of the proximal RCA (7mm).   [facility] 7/29/2022:  Stable proximal-mid LAD and proximal RCA ectasia. Non-obstructive mild diffuse disease present otherwise.   [facility] 9/2022: LAD aneurysm with normal FFR, normal CFR and abnormal IMR consistent with microvascular dysfunction.   Mild ascending aortic dilatation  4.1 cm by CT scan 11/2021, [facility]  4.0 cm by coronary CTA 7/2022, [facility]   Unclear history of congestive heart failure  Incidentally noted lung nodules  CT scan 11/2021  CVA  Hypertension  Bradycardia  Hyperlipidemia  Colon cancer  GERD  Sleep disturbances, negative for sleep apnea, approximately 2018   Restless leg syndrome  Noted during  sleep study, approximately 2018  Former tobacco use, quit 2014  Electrocution 1970s  Colonic mass  Partial colectomy, 7 inches removed, lesion was found to be precancerous  Right knee surgery 11/2021    The patient presents for follow-up.   Continues to have stable chest pains.  Takes nitroglycerin several times a week.  Does have some days he does not need to take nitro and other times he will take nitro several days in a few.  Continues to be active around the house.  Does have to rest frequently. Blood pressure has been low in the last few days.        Review of Systems:  As noted in the HPI    PFSH:  Patient Active Problem List   Diagnosis    BPH (benign prostatic hyperplasia)    Renal cyst    Family history of colon cancer    Colon cancer screening    Lesion of colon    History of colon polyps    RLQ abdominal pain    Adenomatous polyp of ascending colon    Heartburn    Colon adenoma    Colon neoplasm    HTN (hypertension)    Hyperlipidemia    GERD (gastroesophageal reflux disease)    S/P right colectomy, laparoscopic    Leukocytosis, likely reactive    Acute postoperative pain    Postoperative ileus, resolved.    Coronary artery disease of native artery of native heart with stable angina pectoris    Coronary artery aneurysm    Ascending aorta dilation    Status post right rotator cuff repair    Complete tear of right rotator cuff         Current Outpatient Medications:     acetaminophen (TYLENOL) 500 MG tablet, Take 1 tablet by mouth Daily As Needed., Disp: 90 tablet, Rfl: 0    amLODIPine (NORVASC) 2.5 MG tablet, Take 1 tablet by mouth Daily., Disp: , Rfl:     apixaban (Eliquis) 5 MG tablet tablet, Take 1 tablet by mouth Every 12 (Twelve) Hours., Disp: , Rfl:     aspirin 81 MG chewable tablet, Chew 1 tablet Daily., Disp: , Rfl:     baclofen (LIORESAL) 10 MG tablet, Take 1 tablet by mouth Daily As Needed for Muscle Spasms., Disp: , Rfl:     busPIRone (BUSPAR) 5 MG tablet, Take 1 tablet by mouth 2 (Two) Times a  "Day As Needed (anxiety)., Disp: , Rfl:     cetirizine (zyrTEC) 10 MG tablet, Take 1 tablet by mouth Daily., Disp: , Rfl:     Emollient (LUBRIDERM LOTION) lotion, Apply 1 application topically to the appropriate area as directed As Needed., Disp: , Rfl:     famotidine (PEPCID) 20 MG tablet, Take 1 tablet by mouth., Disp: , Rfl:     fluticasone (FLONASE) 50 MCG/ACT nasal spray, Administer 2 sprays into the nostril(s) as directed by provider Daily., Disp: , Rfl:     isosorbide mononitrate (IMDUR) 60 MG 24 hr tablet, Take 1 tablet by mouth Daily., Disp: 90 tablet, Rfl: 3    metoprolol tartrate (LOPRESSOR) 25 MG tablet, Take 1 tablet by mouth 2 (Two) Times a Day., Disp: , Rfl:     nitroglycerin (NITROSTAT) 0.4 MG SL tablet, Place 1 tablet under the tongue Every 5 (Five) Minutes As Needed for Chest Pain. Take no more than 3 doses in 15 minutes., Disp: , Rfl:     pantoprazole (Protonix) 40 MG EC tablet, Take 1 tablet by mouth 30 minutes before breakfast daily. (Patient taking differently: Take 1 tablet by mouth Daily. Take 1 tablet by mouth 30 minutes before breakfast daily.), Disp: 30 tablet, Rfl: 5    rosuvastatin (CRESTOR) 40 MG tablet, Take 1 tablet by mouth Daily., Disp: , Rfl:     No Known Allergies    Social History     Socioeconomic History    Marital status:    Tobacco Use    Smoking status: Former     Current packs/day: 0.00     Average packs/day: 0.3 packs/day for 46.7 years (12.0 ttl pk-yrs)     Types: Cigarettes     Start date: 1974     Quit date: 2014     Years since quittin.3    Smokeless tobacco: Never   Vaping Use    Vaping status: Never Used   Substance and Sexual Activity    Alcohol use: No    Drug use: No    Sexual activity: Not Currently     Partners: Female     Birth control/protection: Natural family planning/Rhythm         Objective:   Physical Exam:  BP 94/60 (BP Location: Right arm, Patient Position: Sitting)   Pulse 50   Ht 180.3 cm (71\")   Wt 90.7 kg (200 lb)   SpO2 98%  " " BMI 27.89 kg/m²   CONSTITUTIONAL: No acute distress  CARDIOVASCULAR: Regular rate and rhythm with normal S1 and S2. Without murmur.  PERIPHERAL VASCULAR: No carotid bruit bilaterally.  Normal radial pulse.    Labs:    No results found for: \"CHOL\"  No results found for: \"TRIG\"  No results found for: \"HDL\"  No results found for: \"LDL\"  No components found for: \"LDLDIRECTC\"    Diagnostic Data:      ECG 12 Lead    Date/Time: 5/19/2025 3:28 PM  Performed by: Kathia Anna APRN    Authorized by: Kathia Anna APRN  Comparison: compared with previous ECG from 3/4/2024  Similar to previous ECG  Rhythm: sinus bradycardia  Rate: bradycardic  BPM: 50  Conduction: conduction normal            [facility], 11/2019  -Aneurysmal dilatation of proximal to mid LAD (with negative FFR 0.88 & Pd/Pa   0.93) with mid LAD ectasia, otherwise mild diffuse disease   -Proximal RCA ectasia with mild diffuse disease   -Overall, mild non-obstructive CAD       Coronary CTA 11/2021, [facility]  1.  Normal coronary origins and course with aneurysmal changes of the   proximal to mid LAD and ectatic proximal RCA as detailed above, without   evidence of significant atherosclerotic changes or stenotic disease.     2. The aortic root is mildly ectatic measuring 4.1 cm.  The remaining   visualized thoracic aorta is normal in caliber, course and contour.     There is no acute aortic pathology.     Coronary CTA 7/18/2022, [facility]  Normal coronary artery origins and courses with stable ectasia of the proximal to mid LAD (maximal diameter 8 mm) and stable ectasia of the proximal RCA (7 mm).   No significant luminal stenosis.     [facility] 7/29/2022, [facility]  Right dominant system  Stable proximal-mid LAD and proximal RCA ectasia.   Non-obstructive mild diffuse disease present otherwise.     [facility] 9/2022 [facility]: LAD aneurysm with normal FFR, normal CFR and abnormal IMR consistent with microvascular dysfunction.       Echo Nov 2019  - " Exam indication: Initial evaluation valvular heart disease   - The left ventricle is normal in size. There is mild concentric left ventricular   hypertrophy. Left ventricular systolic function is normal. EF = 70 ± 5% (2D   biplane)   - The right ventricle is normal in size. Right ventricular systolic function is   normal.   - The left atrial cavity is mildly dilated.   - The visualized aorta is borderline dilated with a maximal dimension of 4.0 cm.   - There is mild to moderate mitral regurgitation.   - The patient has not had a prior [facility] echocardiographic exam for comparison.     TTE 7/18/2022  The left ventricle is normal in size. Left ventricular systolic function is normal. EF 63% +/- 5%.   Grade I left ventricular diastolic dysfunction.   The right ventricle is normal in size. Right ventricular systolic function is normal.  The left atrial cavity is mildly dilated.   The visualized aorta is borderline dilated with a maximal dimension of 4.0 cm.      Assessment and Plan:     Coronary artery disease of native artery of native heart  Microvascular dysfunction  Coronary ectasia/aneurysm  Mild dilatation of the ascending aorta  -Followed regularly at the [facility]  -Continues to have stable anginal-like symptoms. Stable EKG today.   -Discussed again the option of adding Ranexa.  Previously patient has deferred additional medication changes.  He will discuss with Dr. Taveras at [facility] appointment in July.   -With relative hypotension in clinic today and reportedly for a few days at home.  Discussed holding morning dose of metoprolol if SBP < 100.    -Continue current medical therapy including aspirin, apixaban, Lopressor, Imdur, statin  -Heart healthy diet/lifestyle modifications, exercise limitations per [facility]    - Return in about 1 year (around 5/19/2026) for Next scheduled follow up with Dr. Jerez, EKG .      Electronically signed by JUANIS Laureano, 05/19/25, 3:24 PM EDT.

## 2025-06-06 LAB — PSA SERPL-MCNC: 1.7 NG/ML (ref 0–4)

## 2025-06-11 ENCOUNTER — TELEPHONE (OUTPATIENT)
Age: 70
End: 2025-06-11
Payer: MEDICARE

## 2025-06-11 NOTE — TELEPHONE ENCOUNTER
"    Caller: Deny Cee Jr. \"Dante\"    Relationship to patient: Self    Best call back number: [phone]    Chief complaint: RIGHT SIDE OF CHEST HURTS, PAIN UNDER RIBS    Patient directed to call 911 or go to their nearest emergency room.     Patient verbalized understanding: [x] Yes  [] No  If no, why?      "

## 2025-06-16 NOTE — TELEPHONE ENCOUNTER
Went to Urgent care and was prescribed medication. He reports feeling a little better but still coughing. I informed him that if he doesn't improve and needs to be seen sooner than 6/27/2025 to let us know. Patient verbalized understanding.

## 2025-06-27 ENCOUNTER — OFFICE VISIT (OUTPATIENT)
Age: 70
End: 2025-06-27
Payer: MEDICARE

## 2025-06-27 VITALS
HEART RATE: 77 BPM | OXYGEN SATURATION: 97 % | SYSTOLIC BLOOD PRESSURE: 108 MMHG | BODY MASS INDEX: 28 KG/M2 | DIASTOLIC BLOOD PRESSURE: 65 MMHG | TEMPERATURE: 98.3 F | WEIGHT: 200 LBS | HEIGHT: 71 IN

## 2025-06-27 DIAGNOSIS — R07.81 RIB PAIN ON LEFT SIDE: Primary | ICD-10-CM

## 2025-06-27 DIAGNOSIS — S22.42XA CLOSED FRACTURE OF MULTIPLE RIBS OF LEFT SIDE, INITIAL ENCOUNTER: ICD-10-CM

## 2025-06-27 DIAGNOSIS — Z00.01 ENCOUNTER FOR GENERAL ADULT MEDICAL EXAMINATION WITH ABNORMAL FINDINGS: ICD-10-CM

## 2025-06-27 RX ORDER — METOPROLOL TARTRATE 25 MG/1
25 TABLET, FILM COATED ORAL 2 TIMES DAILY
Qty: 180 TABLET | Refills: 3 | Status: SHIPPED | OUTPATIENT
Start: 2025-06-27

## 2025-06-27 RX ORDER — ACETAMINOPHEN 500 MG
500 TABLET ORAL DAILY PRN
Qty: 90 TABLET | Refills: 3 | Status: SHIPPED | OUTPATIENT
Start: 2025-06-27

## 2025-06-27 RX ORDER — ISOSORBIDE MONONITRATE 60 MG/1
60 TABLET, EXTENDED RELEASE ORAL DAILY
Qty: 90 TABLET | Refills: 3 | Status: SHIPPED | OUTPATIENT
Start: 2025-06-27

## 2025-06-27 RX ORDER — AMLODIPINE BESYLATE 2.5 MG/1
2.5 TABLET ORAL DAILY
Qty: 90 TABLET | Refills: 3 | Status: SHIPPED | OUTPATIENT
Start: 2025-06-27

## 2025-06-27 RX ORDER — BACLOFEN 10 MG/1
10 TABLET ORAL DAILY PRN
Qty: 90 TABLET | Refills: 3 | Status: SHIPPED | OUTPATIENT
Start: 2025-06-27

## 2025-06-27 RX ORDER — SOD CHLORD/LANOLIN/MIN.OIL/PET
LOTION (ML) TOPICAL AS NEEDED
Qty: 473 ML | Refills: 3 | Status: SHIPPED | OUTPATIENT
Start: 2025-06-27

## 2025-06-27 RX ORDER — BUSPIRONE HYDROCHLORIDE 5 MG/1
5 TABLET ORAL 2 TIMES DAILY PRN
Qty: 180 TABLET | Refills: 3 | Status: SHIPPED | OUTPATIENT
Start: 2025-06-27

## 2025-06-27 RX ORDER — ROSUVASTATIN CALCIUM 40 MG/1
40 TABLET, COATED ORAL DAILY
Qty: 90 TABLET | Refills: 3 | Status: SHIPPED | OUTPATIENT
Start: 2025-06-27

## 2025-06-27 NOTE — PROGRESS NOTES
Subjective   The ABCs of the Annual Wellness Visit  Medicare Wellness Visit      Deny Cee Jr. is a 69 y.o. patient who presents for a Medicare Wellness Visit.    The following portions of the patient's history were reviewed and   updated as appropriate: allergies, current medications, past family history, past medical history, past social history, past surgical history, and problem list.    Compared to one year ago, the patient's physical   health is better.  Compared to one year ago, the patient's mental   health is the same.    Recent Hospitalizations:  He was not admitted to the hospital during the last year.     Current Medical Providers:  Patient Care Team:  Butch Ruvalcaba DO as PCP - General (Family Medicine)  Jose Delgado MD as PCP - Family Medicine  Noah Jerez MD as Consulting Physician (Cardiology)  Fish Toribio MD as Consulting Physician (Orthopedic Surgery)  Lashon Joe PA-C as Physician Assistant (Physician Assistant)    Outpatient Medications Prior to Visit   Medication Sig Dispense Refill    aspirin 81 MG chewable tablet Chew 1 tablet Daily.      cetirizine (zyrTEC) 10 MG tablet Take 1 tablet by mouth Daily.      famotidine (PEPCID) 20 MG tablet Take 1 tablet by mouth.      fluticasone (FLONASE) 50 MCG/ACT nasal spray Administer 2 sprays into the nostril(s) as directed by provider Daily.      nitroglycerin (NITROSTAT) 0.4 MG SL tablet Place 1 tablet under the tongue Every 5 (Five) Minutes As Needed for Chest Pain. Take no more than 3 doses in 15 minutes.      pantoprazole (Protonix) 40 MG EC tablet Take 1 tablet by mouth 30 minutes before breakfast daily. (Patient taking differently: Take 1 tablet by mouth Daily. Take 1 tablet by mouth 30 minutes before breakfast daily.) 30 tablet 5    acetaminophen (TYLENOL) 500 MG tablet Take 1 tablet by mouth Daily As Needed. 90 tablet 0    amLODIPine (NORVASC) 2.5 MG tablet Take 1 tablet by mouth Daily.      apixaban  (Eliquis) 5 MG tablet tablet Take 1 tablet by mouth Every 12 (Twelve) Hours.      baclofen (LIORESAL) 10 MG tablet Take 1 tablet by mouth Daily As Needed for Muscle Spasms.      busPIRone (BUSPAR) 5 MG tablet Take 1 tablet by mouth 2 (Two) Times a Day As Needed (anxiety).      Emollient (LUBRIDERM LOTION) lotion Apply 1 application topically to the appropriate area as directed As Needed.      isosorbide mononitrate (IMDUR) 60 MG 24 hr tablet Take 1 tablet by mouth Daily. 90 tablet 3    metoprolol tartrate (LOPRESSOR) 25 MG tablet Take 1 tablet by mouth 2 (Two) Times a Day.      rosuvastatin (CRESTOR) 40 MG tablet Take 1 tablet by mouth Daily.       No facility-administered medications prior to visit.     No opioid medication identified on active medication list. I have reviewed chart for other potential  high risk medication/s and harmful drug interactions in the elderly.      Aspirin is on active medication list. On ASA 81 mg qday.      Patient Active Problem List   Diagnosis    BPH (benign prostatic hyperplasia)    Renal cyst    Family history of colon cancer    Colon cancer screening    Lesion of colon    History of colon polyps    RLQ abdominal pain    Adenomatous polyp of ascending colon    Heartburn    Colon adenoma    Colon neoplasm    HTN (hypertension)    Hyperlipidemia    GERD (gastroesophageal reflux disease)    S/P right colectomy, laparoscopic    Leukocytosis, likely reactive    Acute postoperative pain    Postoperative ileus, resolved.    Coronary artery disease of native artery of native heart with stable angina pectoris    Coronary artery aneurysm    Ascending aorta dilation    Status post right rotator cuff repair    Complete tear of right rotator cuff     Advance Care Planning Advance Directive is not on file.  ACP discussion was declined by the patient. Patient does not have an advance directive, declines further assistance.            Objective   Vitals:    06/27/25 1400   BP: 108/65   Pulse: 77  "  Temp: 98.3 °F (36.8 °C)   SpO2: 97%   Weight: 90.7 kg (200 lb)   Height: 180.3 cm (71\")   PainSc: 5        Estimated body mass index is 27.89 kg/m² as calculated from the following:    Height as of this encounter: 180.3 cm (71\").    Weight as of this encounter: 90.7 kg (200 lb).                Does the patient have evidence of cognitive impairment? No                                                                                                Health  Risk Assessment    Smoking Status:  Social History     Tobacco Use   Smoking Status Former    Current packs/day: 0.00    Average packs/day: 0.3 packs/day for 46.7 years (12.0 ttl pk-yrs)    Types: Cigarettes    Start date: 1974    Quit date: 2014    Years since quittin.4   Smokeless Tobacco Never     Alcohol Consumption:  Social History     Substance and Sexual Activity   Alcohol Use No       Fall Risk Screen  MURPHYADI Fall Risk Assessment was completed, and patient is at HIGH risk for falls. Assessment completed on:2025    Depression Screening   Little interest or pleasure in doing things? Not at all   Feeling down, depressed, or hopeless? Not at all   PHQ-2 Total Score 0      Health Habits and Functional and Cognitive Screenin/20/2025     8:21 AM   Functional & Cognitive Status   Do you have difficulty preparing food and eating? No   Do you have difficulty bathing yourself, getting dressed or grooming yourself? No   Do you have difficulty using the toilet? No   Do you have difficulty moving around from place to place? No   Do you have trouble with steps or getting out of a bed or a chair? Yes   Current Diet Unhealthy Diet   Dental Exam Up to date   Eye Exam Up to date   Exercise (times per week) Other   Current Exercises Include Walking   Do you need help using the phone?  No   Are you deaf or do you have serious difficulty hearing?  No   Do you need help to go to places out of walking distance? No   Do you need help shopping? No   Do you " need help preparing meals?  No   Do you need help with housework?  Yes   Do you need help with laundry? No   Do you need help taking your medications? No   Do you need help managing money? No   Do you ever drive or ride in a car without wearing a seat belt? No   Have you felt unusual fatigue (could be tiredness), stress, anger or loneliness in the last month? Yes    Who do you live with? Spouse   If you need help, do you have trouble finding someone available to you? No   Have you been bothered in the last four weeks by sexual problems? Yes   Do you have difficulty concentrating, remembering or making decisions? No       Data saved with a previous flowsheet row definition           Age-appropriate Screening Schedule:  Refer to the list below for future screening recommendations based on patient's age, sex and/or medical conditions. Orders for these recommended tests are listed in the plan section. The patient has been provided with a written plan.    Health Maintenance List  Health Maintenance   Topic Date Due    TDAP/TD VACCINES (2 - Tdap) 11/13/2011    HEPATITIS C SCREENING  Never done    ANNUAL WELLNESS VISIT  Never done    COVID-19 Vaccine (4 - 2024-25 season) 09/01/2024    LIPID PANEL  07/08/2025    INFLUENZA VACCINE  07/01/2025    COLORECTAL CANCER SCREENING  03/02/2031    Pneumococcal Vaccine 50+  Completed    AAA SCREEN ONCE  Completed    ZOSTER VACCINE  Completed                                                                                                                                                CMS Preventative Services Quick Reference  Risk Factors Identified During Encounter  Fall Risk-High or Moderate: Discussed Fall Prevention in the home    The above risks/problems have been discussed with the patient.  Pertinent information has been shared with the patient in the After Visit Summary.  An After Visit Summary and PPPS were made available to the patient.    Follow Up:   Next Medicare Wellness  "visit to be scheduled in 1 year.         Additional E&M Note during same encounter follows:  Patient has additional, significant, and separately identifiable condition(s)/problem(s) that require work above and beyond the Medicare Wellness Visit     Chief Complaint  Medicare Wellness-subsequent    Subjective   States that about 3 weeks ago he was bending over and working, when he felt a pop in his left rib cage.  Since then has been having increasing pain in the left side.  States that the breathing has slowly gotten somewhat better but again continues to have pain.          Review of Systems   All other systems reviewed and are negative.             Objective   Vital Signs:  /65   Pulse 77   Temp 98.3 °F (36.8 °C)   Ht 180.3 cm (71\")   Wt 90.7 kg (200 lb)   SpO2 97%   BMI 27.89 kg/m²   Physical Exam  Vitals and nursing note reviewed.   Constitutional:       Appearance: Normal appearance.   HENT:      Head: Normocephalic and atraumatic.   Eyes:      General: Lids are normal.      Conjunctiva/sclera: Conjunctivae normal.   Cardiovascular:      Rate and Rhythm: Normal rate and regular rhythm.   Pulmonary:      Effort: Pulmonary effort is normal.      Breath sounds: Normal breath sounds and air entry.   Abdominal:      General: Abdomen is flat. Bowel sounds are normal.      Palpations: Abdomen is soft.   Musculoskeletal:      Cervical back: Full passive range of motion without pain and normal range of motion.   Neurological:      General: No focal deficit present.      Mental Status: He is alert and oriented to person, place, and time.   Psychiatric:         Attention and Perception: Attention normal.         Mood and Affect: Mood normal.         Behavior: Behavior normal. Behavior is cooperative.                    Assessment and Plan      Rib pain on left side    Orders:    XR Ribs Left With PA Chest; Future    Encounter for general adult medical examination with abnormal findings         Closed fracture " of multiple ribs of left side, initial encounter                 Follow Up   Return in about 3 months (around 9/27/2025) for HLD, HTN.  Patient was given instructions and counseling regarding his condition or for health maintenance advice. Please see specific information pulled into the AVS if appropriate.

## (undated) DEVICE — Device

## (undated) DEVICE — Device: Brand: DISPOSABLE INJECTOR NM

## (undated) DEVICE — SUCTION CANISTER, 1500CC, RIGID: Brand: DEROYAL

## (undated) DEVICE — Device: Brand: DEFENDO AIR/WATER/SUCTION AND BIOPSY VALVE

## (undated) DEVICE — SNAR POLYP SENSATION STDOVL 27 240 BX40

## (undated) DEVICE — PAD GRND REM POLYHESIVE A/ DISP

## (undated) DEVICE — SUT VIC 3/0 SH CR8 27IN VCP784D

## (undated) DEVICE — HYBRID TUBING/CAP SET FOR OLYMPUS® SCOPES: Brand: ERBE

## (undated) DEVICE — DEV INFL ALLIANCE2 SYS

## (undated) DEVICE — NDL SCLEROTHERAPY INTERJECT 25G 4 240CM

## (undated) DEVICE — DRSNG WND BORDR/ADHS NONADHR/GZ LF 2X2IN STRL

## (undated) DEVICE — JELLY,LUBE,STERILE,FLIP TOP,TUBE,2-OZ: Brand: MEDLINE

## (undated) DEVICE — PROXIMATE RH ROTATING HEAD SKIN STAPLERS (35 WIDE) CONTAINS 35 STAINLESS STEEL STAPLES: Brand: PROXIMATE

## (undated) DEVICE — ACCESS PLATFORM FOR MINIMALLY INVASIVE SURGERY.: Brand: GELPORT® LAPAROSCOPIC  SYSTEM

## (undated) DEVICE — FRCP BIOP COLD ENDOJAW ALLGTR W/NDL 2.8X2300MM BLU

## (undated) DEVICE — LUBE JELLY PK/2.75GM STRL BX/144

## (undated) DEVICE — TRAP,MUCUS SPECIMEN,40CC: Brand: MEDLINE

## (undated) DEVICE — PENCL ROCKRSWCH MEGADYNE W/HOLSTR 10FT SS

## (undated) DEVICE — FIAPC® PROBE W/ FILTER 2200 SC OD 2.3MM/6.9FR; L 2.2M/7.2FT: Brand: ERBE

## (undated) DEVICE — TOWEL,OR,DSP,ST,BLUE,STD,4/PK,20PK/CS: Brand: MEDLINE

## (undated) DEVICE — TOTAL TRAY, 16FR 10ML SIL FOLEY, URN: Brand: MEDLINE

## (undated) DEVICE — MARYLAND JAW LAPAROSCOPIC SEALER/DIVIDER COATED: Brand: LIGASURE

## (undated) DEVICE — TUBING, SUCTION, 1/4" X 10', STRAIGHT: Brand: MEDLINE

## (undated) DEVICE — DEV OPN LIGASURE CRV 180D 36MM 13.5CM  1P/U

## (undated) DEVICE — MEDI-VAC YANKAUER SUCTION HANDLE: Brand: CARDINAL HEALTH

## (undated) DEVICE — GOWN,REINFORCE,POLY,SIRUS,BREATH SLV,XLG: Brand: MEDLINE

## (undated) DEVICE — RETRV ROTH NET PLAT UNIV

## (undated) DEVICE — CLTH CLENS READYCLEANSE PERI CARE PK/5

## (undated) DEVICE — POOLE SUCTION INSTRUMENT WITH REMOVABLE SHEATH: Brand: POOLE

## (undated) DEVICE — ENDOPATH XCEL UNIVERSAL TROCAR STABLILITY SLEEVES: Brand: ENDOPATH XCEL

## (undated) DEVICE — GLV SURG SENSICARE PI ORTHO PF SZ7 LF STRL

## (undated) DEVICE — SUT PDS 1 CTX 36IN VIO PDP371T

## (undated) DEVICE — SNARE E/S POLYP ACUSNARE TP/NDL 7F 2.5X5.5CM

## (undated) DEVICE — ENDOSCOPY PORT CONNECTOR FOR OLYMPUS® SCOPES: Brand: ERBE

## (undated) DEVICE — VISUALIZATION SYSTEM: Brand: CLEARIFY

## (undated) DEVICE — ENDOPATH XCEL BLADELESS TROCARS WITH STABILITY SLEEVES: Brand: ENDOPATH XCEL

## (undated) DEVICE — VLV SXN AIR/H2O ORCAPOD3 1P/U STRL

## (undated) DEVICE — QUICK CATCH IN-LINE SUCTION POLYP TRAP IS USED FOR SUCTION RETRIEVAL OF ENDOSCOPICALLY REMOVED POLYPS.

## (undated) DEVICE — LAP PORT CLOSURE GUIDES 5MM AND 10/12MM: Brand: LAP PORT CLOSURE GUIDES 5MM AND 10/12MM

## (undated) DEVICE — GLV SURG SENSICARE PI MIC PF SZ6.5 LF STRL

## (undated) DEVICE — FRCP BIOP RADLJAW4 HOT 2.2X240 BX40

## (undated) DEVICE — ESOPHAGEAL BALLOON DILATATION CATHETER: Brand: CRE FIXED WIRE

## (undated) DEVICE — [HIGH FLOW INSUFFLATOR,  DO NOT USE IF PACKAGE IS DAMAGED,  KEEP DRY,  KEEP AWAY FROM SUNLIGHT,  PROTECT FROM HEAT AND RADIOACTIVE SOURCES.]: Brand: PNEUMOSURE

## (undated) DEVICE — ANTIBACTERIAL UNDYED BRAIDED (POLYGLACTIN 910), SYNTHETIC ABSORBABLE SUTURE: Brand: COATED VICRYL

## (undated) DEVICE — SPNG LAP PREWSH SFTPK 18X18IN STRL PK/5

## (undated) DEVICE — ENDOGATOR AUXILIARY WATER JET CONNECTOR: Brand: ENDOGATOR

## (undated) DEVICE — SUT VIC 0 CTD BR 18IN UNDYE VCP724D

## (undated) DEVICE — SAFESECURE,SECUREMENT,FOLEY CATH,STERILE: Brand: MEDLINE

## (undated) DEVICE — INTENDED FOR TISSUE SEPARATION, AND OTHER PROCEDURES THAT REQUIRE A SHARP SURGICAL BLADE TO PUNCTURE OR CUT.: Brand: BARD-PARKER ® STAINLESS STEEL BLADES

## (undated) DEVICE — DRSNG WND GZ PAD BORDERED 4X8IN STRL

## (undated) DEVICE — 3M™ IOBAN™ 2 ANTIMICROBIAL INCISE DRAPE 6650EZ: Brand: IOBAN™ 2

## (undated) DEVICE — CONMED SCOPE SAVER BITE BLOCK, 20X27 MM: Brand: SCOPE SAVER

## (undated) DEVICE — SUT MNCRYL PLS ANTIB UD 4/0 PS2 18IN

## (undated) DEVICE — CVR HNDL LIGHT RIGID

## (undated) DEVICE — PK LAP LASR CHOLE 10

## (undated) DEVICE — COVER,MAYO STAND,XL,STERILE: Brand: MEDLINE